# Patient Record
Sex: FEMALE | Race: WHITE | Employment: UNEMPLOYED | URBAN - METROPOLITAN AREA
[De-identification: names, ages, dates, MRNs, and addresses within clinical notes are randomized per-mention and may not be internally consistent; named-entity substitution may affect disease eponyms.]

---

## 2018-06-26 ENCOUNTER — HOSPITAL ENCOUNTER (OUTPATIENT)
Age: 63
Discharge: HOME | End: 2018-06-28
Payer: MEDICARE

## 2018-06-26 LAB
ALBUMIN SERPL-MCNC: 4 G/DL (ref 3.5–5.2)
ALP BLD-CCNC: 98 U/L (ref 35–104)
ALT SERPL-CCNC: 45 U/L (ref 0–32)
ANION GAP SERPL CALCULATED.3IONS-SCNC: 15 MMOL/L (ref 7–16)
AST SERPL-CCNC: 34 U/L (ref 0–31)
BASOPHILS ABSOLUTE: 0.06 E9/L (ref 0–0.2)
BASOPHILS RELATIVE PERCENT: 0.8 % (ref 0–2)
BILIRUB SERPL-MCNC: 0.6 MG/DL (ref 0–1.2)
BUN BLDV-MCNC: 19 MG/DL (ref 8–23)
CALCIUM SERPL-MCNC: 9.1 MG/DL (ref 8.6–10.2)
CHLORIDE BLD-SCNC: 106 MMOL/L (ref 98–107)
CHOLESTEROL, TOTAL: 148 MG/DL (ref 0–199)
CO2: 21 MMOL/L (ref 22–29)
CREAT SERPL-MCNC: 1.2 MG/DL (ref 0.5–1)
CREATININE URINE: 41 MG/DL (ref 29–226)
EOSINOPHILS ABSOLUTE: 0.21 E9/L (ref 0.05–0.5)
EOSINOPHILS RELATIVE PERCENT: 2.8 % (ref 0–6)
GFR AFRICAN AMERICAN: 55
GFR NON-AFRICAN AMERICAN: 45 ML/MIN/1.73
GLUCOSE BLD-MCNC: 77 MG/DL (ref 74–109)
HBA1C MFR BLD: 7.7 % (ref 4–5.6)
HCT VFR BLD CALC: 37.9 % (ref 34–48)
HDLC SERPL-MCNC: 61 MG/DL
HEMOGLOBIN: 11.2 G/DL (ref 11.5–15.5)
IMMATURE GRANULOCYTES #: 0.02 E9/L
IMMATURE GRANULOCYTES %: 0.3 % (ref 0–5)
LDL CHOLESTEROL CALCULATED: 67 MG/DL (ref 0–99)
LYMPHOCYTES ABSOLUTE: 2.18 E9/L (ref 1.5–4)
LYMPHOCYTES RELATIVE PERCENT: 29.4 % (ref 20–42)
MCH RBC QN AUTO: 26.8 PG (ref 26–35)
MCHC RBC AUTO-ENTMCNC: 29.6 % (ref 32–34.5)
MCV RBC AUTO: 90.7 FL (ref 80–99.9)
MICROALBUMIN UR-MCNC: <12 MG/L
MICROALBUMIN/CREAT UR-RTO: ABNORMAL (ref 0–30)
MONOCYTES ABSOLUTE: 0.63 E9/L (ref 0.1–0.95)
MONOCYTES RELATIVE PERCENT: 8.5 % (ref 2–12)
NEUTROPHILS ABSOLUTE: 4.31 E9/L (ref 1.8–7.3)
NEUTROPHILS RELATIVE PERCENT: 58.2 % (ref 43–80)
PDW BLD-RTO: 17.2 FL (ref 11.5–15)
PLATELET # BLD: 281 E9/L (ref 130–450)
PMV BLD AUTO: 10.2 FL (ref 7–12)
POTASSIUM SERPL-SCNC: 4.8 MMOL/L (ref 3.5–5)
RBC # BLD: 4.18 E12/L (ref 3.5–5.5)
SODIUM BLD-SCNC: 142 MMOL/L (ref 132–146)
TOTAL PROTEIN: 7.1 G/DL (ref 6.4–8.3)
TRIGL SERPL-MCNC: 102 MG/DL (ref 0–149)
TSH SERPL DL<=0.05 MIU/L-ACNC: 3.94 UIU/ML (ref 0.27–4.2)
VLDLC SERPL CALC-MCNC: 20 MG/DL
WBC # BLD: 7.4 E9/L (ref 4.5–11.5)

## 2018-06-26 PROCEDURE — 82044 UR ALBUMIN SEMIQUANTITATIVE: CPT

## 2018-06-26 PROCEDURE — 82570 ASSAY OF URINE CREATININE: CPT

## 2018-06-26 PROCEDURE — 83036 HEMOGLOBIN GLYCOSYLATED A1C: CPT

## 2018-06-26 PROCEDURE — 85025 COMPLETE CBC W/AUTO DIFF WBC: CPT

## 2018-06-26 PROCEDURE — 80053 COMPREHEN METABOLIC PANEL: CPT

## 2018-06-26 PROCEDURE — 84443 ASSAY THYROID STIM HORMONE: CPT

## 2018-06-26 PROCEDURE — 80061 LIPID PANEL: CPT

## 2018-10-26 ENCOUNTER — HOSPITAL ENCOUNTER (OUTPATIENT)
Age: 63
Discharge: HOME | End: 2018-10-28
Payer: MEDICARE

## 2018-10-26 LAB
ALBUMIN SERPL-MCNC: 4.3 G/DL (ref 3.5–5.2)
ALP BLD-CCNC: 81 U/L (ref 35–104)
ALT SERPL-CCNC: 79 U/L (ref 0–32)
ANION GAP SERPL CALCULATED.3IONS-SCNC: 15 MMOL/L (ref 7–16)
AST SERPL-CCNC: 46 U/L (ref 0–31)
BASOPHILS ABSOLUTE: 0.05 E9/L (ref 0–0.2)
BASOPHILS RELATIVE PERCENT: 0.7 % (ref 0–2)
BILIRUB SERPL-MCNC: 0.6 MG/DL (ref 0–1.2)
BUN BLDV-MCNC: 29 MG/DL (ref 8–23)
CALCIUM SERPL-MCNC: 9.4 MG/DL (ref 8.6–10.2)
CHLORIDE BLD-SCNC: 106 MMOL/L (ref 98–107)
CHOLESTEROL, TOTAL: 159 MG/DL (ref 0–199)
CO2: 21 MMOL/L (ref 22–29)
CREAT SERPL-MCNC: 1.2 MG/DL (ref 0.5–1)
CREATININE URINE: 118 MG/DL (ref 29–226)
EOSINOPHILS ABSOLUTE: 0.16 E9/L (ref 0.05–0.5)
EOSINOPHILS RELATIVE PERCENT: 2.1 % (ref 0–6)
GFR AFRICAN AMERICAN: 55
GFR NON-AFRICAN AMERICAN: 45 ML/MIN/1.73
GLUCOSE BLD-MCNC: 142 MG/DL (ref 74–109)
HBA1C MFR BLD: 7.9 % (ref 4–5.6)
HCT VFR BLD CALC: 43.8 % (ref 34–48)
HDLC SERPL-MCNC: 60 MG/DL
HEMOGLOBIN: 13.4 G/DL (ref 11.5–15.5)
IMMATURE GRANULOCYTES #: 0.02 E9/L
IMMATURE GRANULOCYTES %: 0.3 % (ref 0–5)
LDL CHOLESTEROL CALCULATED: 65 MG/DL (ref 0–99)
LYMPHOCYTES ABSOLUTE: 2.12 E9/L (ref 1.5–4)
LYMPHOCYTES RELATIVE PERCENT: 28.5 % (ref 20–42)
MCH RBC QN AUTO: 28.2 PG (ref 26–35)
MCHC RBC AUTO-ENTMCNC: 30.6 % (ref 32–34.5)
MCV RBC AUTO: 92.2 FL (ref 80–99.9)
MICROALBUMIN UR-MCNC: <12 MG/L
MICROALBUMIN/CREAT UR-RTO: ABNORMAL (ref 0–30)
MONOCYTES ABSOLUTE: 0.48 E9/L (ref 0.1–0.95)
MONOCYTES RELATIVE PERCENT: 6.4 % (ref 2–12)
NEUTROPHILS ABSOLUTE: 4.62 E9/L (ref 1.8–7.3)
NEUTROPHILS RELATIVE PERCENT: 62 % (ref 43–80)
PDW BLD-RTO: 18.6 FL (ref 11.5–15)
PLATELET # BLD: 222 E9/L (ref 130–450)
PMV BLD AUTO: 10.3 FL (ref 7–12)
POTASSIUM SERPL-SCNC: 4.7 MMOL/L (ref 3.5–5)
RBC # BLD: 4.75 E12/L (ref 3.5–5.5)
SODIUM BLD-SCNC: 142 MMOL/L (ref 132–146)
TOTAL PROTEIN: 7.5 G/DL (ref 6.4–8.3)
TRIGL SERPL-MCNC: 169 MG/DL (ref 0–149)
TSH SERPL DL<=0.05 MIU/L-ACNC: 1.77 UIU/ML (ref 0.27–4.2)
VLDLC SERPL CALC-MCNC: 34 MG/DL
WBC # BLD: 7.5 E9/L (ref 4.5–11.5)

## 2018-10-26 PROCEDURE — 82044 UR ALBUMIN SEMIQUANTITATIVE: CPT

## 2018-10-26 PROCEDURE — 85025 COMPLETE CBC W/AUTO DIFF WBC: CPT

## 2018-10-26 PROCEDURE — 83036 HEMOGLOBIN GLYCOSYLATED A1C: CPT

## 2018-10-26 PROCEDURE — 84443 ASSAY THYROID STIM HORMONE: CPT

## 2018-10-26 PROCEDURE — 82570 ASSAY OF URINE CREATININE: CPT

## 2018-10-26 PROCEDURE — 80053 COMPREHEN METABOLIC PANEL: CPT

## 2018-10-26 PROCEDURE — 80061 LIPID PANEL: CPT

## 2019-02-26 ENCOUNTER — HOSPITAL ENCOUNTER (OUTPATIENT)
Age: 64
Discharge: HOME | End: 2019-02-28
Payer: MEDICARE

## 2019-02-26 LAB
ALBUMIN SERPL-MCNC: 4.3 G/DL (ref 3.5–5.2)
ALP BLD-CCNC: 88 U/L (ref 35–104)
ALT SERPL-CCNC: 43 U/L (ref 0–32)
ANION GAP SERPL CALCULATED.3IONS-SCNC: 12 MMOL/L (ref 7–16)
AST SERPL-CCNC: 29 U/L (ref 0–31)
BASOPHILS ABSOLUTE: 0.04 E9/L (ref 0–0.2)
BASOPHILS RELATIVE PERCENT: 0.6 % (ref 0–2)
BILIRUB SERPL-MCNC: 0.6 MG/DL (ref 0–1.2)
BUN BLDV-MCNC: 18 MG/DL (ref 8–23)
CALCIUM SERPL-MCNC: 9.1 MG/DL (ref 8.6–10.2)
CHLORIDE BLD-SCNC: 106 MMOL/L (ref 98–107)
CHOLESTEROL, TOTAL: 154 MG/DL (ref 0–199)
CO2: 24 MMOL/L (ref 22–29)
CREAT SERPL-MCNC: 1.1 MG/DL (ref 0.5–1)
CREATININE URINE: 106 MG/DL (ref 29–226)
EOSINOPHILS ABSOLUTE: 0.14 E9/L (ref 0.05–0.5)
EOSINOPHILS RELATIVE PERCENT: 2.2 % (ref 0–6)
FOLATE: >20 NG/ML (ref 4.8–24.2)
GFR AFRICAN AMERICAN: >60
GFR NON-AFRICAN AMERICAN: 50 ML/MIN/1.73
GLUCOSE BLD-MCNC: 145 MG/DL (ref 74–99)
HBA1C MFR BLD: 7.4 % (ref 4–5.6)
HCT VFR BLD CALC: 44.4 % (ref 34–48)
HDLC SERPL-MCNC: 56 MG/DL
HEMOGLOBIN: 13.8 G/DL (ref 11.5–15.5)
IMMATURE GRANULOCYTES #: 0.02 E9/L
IMMATURE GRANULOCYTES %: 0.3 % (ref 0–5)
LDL CHOLESTEROL CALCULATED: 64 MG/DL (ref 0–99)
LYMPHOCYTES ABSOLUTE: 1.59 E9/L (ref 1.5–4)
LYMPHOCYTES RELATIVE PERCENT: 25 % (ref 20–42)
MCH RBC QN AUTO: 31.2 PG (ref 26–35)
MCHC RBC AUTO-ENTMCNC: 31.1 % (ref 32–34.5)
MCV RBC AUTO: 100.2 FL (ref 80–99.9)
MICROALBUMIN UR-MCNC: <12 MG/L
MICROALBUMIN/CREAT UR-RTO: ABNORMAL (ref 0–30)
MONOCYTES ABSOLUTE: 0.42 E9/L (ref 0.1–0.95)
MONOCYTES RELATIVE PERCENT: 6.6 % (ref 2–12)
NEUTROPHILS ABSOLUTE: 4.16 E9/L (ref 1.8–7.3)
NEUTROPHILS RELATIVE PERCENT: 65.3 % (ref 43–80)
PDW BLD-RTO: 13.8 FL (ref 11.5–15)
PLATELET # BLD: 235 E9/L (ref 130–450)
PMV BLD AUTO: 9.9 FL (ref 7–12)
POTASSIUM SERPL-SCNC: 4.5 MMOL/L (ref 3.5–5)
RBC # BLD: 4.43 E12/L (ref 3.5–5.5)
SODIUM BLD-SCNC: 142 MMOL/L (ref 132–146)
TOTAL PROTEIN: 7.1 G/DL (ref 6.4–8.3)
TRIGL SERPL-MCNC: 171 MG/DL (ref 0–149)
TSH SERPL DL<=0.05 MIU/L-ACNC: 2.56 UIU/ML (ref 0.27–4.2)
VITAMIN B-12: 1293 PG/ML (ref 211–946)
VITAMIN D 25-HYDROXY: 23 NG/ML (ref 30–100)
VLDLC SERPL CALC-MCNC: 34 MG/DL
WBC # BLD: 6.4 E9/L (ref 4.5–11.5)

## 2019-02-26 PROCEDURE — 82044 UR ALBUMIN SEMIQUANTITATIVE: CPT

## 2019-02-26 PROCEDURE — 82306 VITAMIN D 25 HYDROXY: CPT

## 2019-02-26 PROCEDURE — 84425 ASSAY OF VITAMIN B-1: CPT

## 2019-02-26 PROCEDURE — 84630 ASSAY OF ZINC: CPT

## 2019-02-26 PROCEDURE — 80053 COMPREHEN METABOLIC PANEL: CPT

## 2019-02-26 PROCEDURE — 82746 ASSAY OF FOLIC ACID SERUM: CPT

## 2019-02-26 PROCEDURE — 82607 VITAMIN B-12: CPT

## 2019-02-26 PROCEDURE — 80061 LIPID PANEL: CPT

## 2019-02-26 PROCEDURE — 85025 COMPLETE CBC W/AUTO DIFF WBC: CPT

## 2019-02-26 PROCEDURE — 83036 HEMOGLOBIN GLYCOSYLATED A1C: CPT

## 2019-02-26 PROCEDURE — 82570 ASSAY OF URINE CREATININE: CPT

## 2019-02-26 PROCEDURE — 84443 ASSAY THYROID STIM HORMONE: CPT

## 2019-03-02 LAB
VITAMIN B1, PLASMA: 10 NMOL/L (ref 4–15)
ZINC: 76 UG/DL (ref 60–120)

## 2019-03-14 ENCOUNTER — HOSPITAL ENCOUNTER (OUTPATIENT)
Dept: MAMMOGRAPHY | Age: 64
Discharge: HOME | End: 2019-03-16
Payer: MEDICARE

## 2019-03-14 DIAGNOSIS — Z12.39 BREAST CANCER SCREENING: ICD-10-CM

## 2019-03-14 PROCEDURE — 77063 BREAST TOMOSYNTHESIS BI: CPT

## 2019-06-26 ENCOUNTER — HOSPITAL ENCOUNTER (OUTPATIENT)
Age: 64
Discharge: HOME | End: 2019-06-28
Payer: MEDICARE

## 2019-06-26 LAB
ALBUMIN SERPL-MCNC: 4.3 G/DL (ref 3.5–5.2)
ALP BLD-CCNC: 74 U/L (ref 35–104)
ALT SERPL-CCNC: 36 U/L (ref 0–32)
ANION GAP SERPL CALCULATED.3IONS-SCNC: 13 MMOL/L (ref 7–16)
AST SERPL-CCNC: 26 U/L (ref 0–31)
BASOPHILS ABSOLUTE: 0.04 E9/L (ref 0–0.2)
BASOPHILS RELATIVE PERCENT: 0.6 % (ref 0–2)
BILIRUB SERPL-MCNC: 0.5 MG/DL (ref 0–1.2)
BUN BLDV-MCNC: 25 MG/DL (ref 8–23)
CALCIUM SERPL-MCNC: 9.2 MG/DL (ref 8.6–10.2)
CHLORIDE BLD-SCNC: 110 MMOL/L (ref 98–107)
CHOLESTEROL, TOTAL: 157 MG/DL (ref 0–199)
CO2: 20 MMOL/L (ref 22–29)
CREAT SERPL-MCNC: 1.2 MG/DL (ref 0.5–1)
CREATININE URINE: 71 MG/DL (ref 29–226)
EOSINOPHILS ABSOLUTE: 0.15 E9/L (ref 0.05–0.5)
EOSINOPHILS RELATIVE PERCENT: 2.3 % (ref 0–6)
FOLATE: >20 NG/ML (ref 4.8–24.2)
GFR AFRICAN AMERICAN: 54
GFR NON-AFRICAN AMERICAN: 45 ML/MIN/1.73
GLUCOSE BLD-MCNC: 66 MG/DL (ref 74–99)
HBA1C MFR BLD: 7.3 % (ref 4–5.6)
HCT VFR BLD CALC: 42.5 % (ref 34–48)
HDLC SERPL-MCNC: 62 MG/DL
HEMOGLOBIN: 13.1 G/DL (ref 11.5–15.5)
IMMATURE GRANULOCYTES #: 0.01 E9/L
IMMATURE GRANULOCYTES %: 0.2 % (ref 0–5)
LDL CHOLESTEROL CALCULATED: 70 MG/DL (ref 0–99)
LYMPHOCYTES ABSOLUTE: 2.52 E9/L (ref 1.5–4)
LYMPHOCYTES RELATIVE PERCENT: 38.2 % (ref 20–42)
MCH RBC QN AUTO: 31.9 PG (ref 26–35)
MCHC RBC AUTO-ENTMCNC: 30.8 % (ref 32–34.5)
MCV RBC AUTO: 103.4 FL (ref 80–99.9)
MICROALBUMIN UR-MCNC: <12 MG/L
MICROALBUMIN/CREAT UR-RTO: ABNORMAL (ref 0–30)
MONOCYTES ABSOLUTE: 0.53 E9/L (ref 0.1–0.95)
MONOCYTES RELATIVE PERCENT: 8 % (ref 2–12)
NEUTROPHILS ABSOLUTE: 3.35 E9/L (ref 1.8–7.3)
NEUTROPHILS RELATIVE PERCENT: 50.7 % (ref 43–80)
PDW BLD-RTO: 14.2 FL (ref 11.5–15)
PLATELET # BLD: 239 E9/L (ref 130–450)
PMV BLD AUTO: 9.6 FL (ref 7–12)
POTASSIUM SERPL-SCNC: 4.5 MMOL/L (ref 3.5–5)
RBC # BLD: 4.11 E12/L (ref 3.5–5.5)
SODIUM BLD-SCNC: 143 MMOL/L (ref 132–146)
TOTAL PROTEIN: 6.9 G/DL (ref 6.4–8.3)
TRIGL SERPL-MCNC: 126 MG/DL (ref 0–149)
TSH SERPL DL<=0.05 MIU/L-ACNC: 2.88 UIU/ML (ref 0.27–4.2)
VITAMIN B-12: 753 PG/ML (ref 211–946)
VITAMIN D 25-HYDROXY: 28 NG/ML (ref 30–100)
VLDLC SERPL CALC-MCNC: 25 MG/DL
WBC # BLD: 6.6 E9/L (ref 4.5–11.5)

## 2019-06-26 PROCEDURE — 82044 UR ALBUMIN SEMIQUANTITATIVE: CPT

## 2019-06-26 PROCEDURE — 80061 LIPID PANEL: CPT

## 2019-06-26 PROCEDURE — 82306 VITAMIN D 25 HYDROXY: CPT

## 2019-06-26 PROCEDURE — 84630 ASSAY OF ZINC: CPT

## 2019-06-26 PROCEDURE — 83036 HEMOGLOBIN GLYCOSYLATED A1C: CPT

## 2019-06-26 PROCEDURE — 82746 ASSAY OF FOLIC ACID SERUM: CPT

## 2019-06-26 PROCEDURE — 85025 COMPLETE CBC W/AUTO DIFF WBC: CPT

## 2019-06-26 PROCEDURE — 82570 ASSAY OF URINE CREATININE: CPT

## 2019-06-26 PROCEDURE — 80053 COMPREHEN METABOLIC PANEL: CPT

## 2019-06-26 PROCEDURE — 84425 ASSAY OF VITAMIN B-1: CPT

## 2019-06-26 PROCEDURE — 84443 ASSAY THYROID STIM HORMONE: CPT

## 2019-06-26 PROCEDURE — 82607 VITAMIN B-12: CPT

## 2019-06-30 LAB
VITAMIN B1, PLASMA: 37 NMOL/L (ref 4–15)
ZINC: 72.6 UG/DL (ref 60–120)

## 2019-10-07 ENCOUNTER — HOSPITAL ENCOUNTER (OUTPATIENT)
Age: 64
Discharge: HOME | End: 2019-10-09
Payer: MEDICARE

## 2019-10-07 LAB
ALBUMIN SERPL-MCNC: 4.2 G/DL (ref 3.5–5.2)
ALP BLD-CCNC: 85 U/L (ref 35–104)
ALT SERPL-CCNC: 28 U/L (ref 0–32)
ANION GAP SERPL CALCULATED.3IONS-SCNC: 12 MMOL/L (ref 7–16)
AST SERPL-CCNC: 24 U/L (ref 0–31)
BASOPHILS ABSOLUTE: 0.04 E9/L (ref 0–0.2)
BASOPHILS RELATIVE PERCENT: 0.6 % (ref 0–2)
BILIRUB SERPL-MCNC: 0.8 MG/DL (ref 0–1.2)
BUN BLDV-MCNC: 24 MG/DL (ref 8–23)
C-REACTIVE PROTEIN: 0.2 MG/DL (ref 0–0.4)
CALCIUM SERPL-MCNC: 9.6 MG/DL (ref 8.6–10.2)
CHLORIDE BLD-SCNC: 104 MMOL/L (ref 98–107)
CO2: 26 MMOL/L (ref 22–29)
CREAT SERPL-MCNC: 1.2 MG/DL (ref 0.5–1)
EOSINOPHILS ABSOLUTE: 0.07 E9/L (ref 0.05–0.5)
EOSINOPHILS RELATIVE PERCENT: 1.1 % (ref 0–6)
FOLATE: >20 NG/ML (ref 4.8–24.2)
GFR AFRICAN AMERICAN: 54
GFR NON-AFRICAN AMERICAN: 45 ML/MIN/1.73
GLUCOSE BLD-MCNC: 188 MG/DL (ref 74–99)
HBA1C MFR BLD: 6.9 % (ref 4–5.6)
HCT VFR BLD CALC: 41.9 % (ref 34–48)
HEMOGLOBIN: 13.4 G/DL (ref 11.5–15.5)
IMMATURE GRANULOCYTES #: 0.01 E9/L
IMMATURE GRANULOCYTES %: 0.2 % (ref 0–5)
LYMPHOCYTES ABSOLUTE: 1.16 E9/L (ref 1.5–4)
LYMPHOCYTES RELATIVE PERCENT: 17.8 % (ref 20–42)
MCH RBC QN AUTO: 32.2 PG (ref 26–35)
MCHC RBC AUTO-ENTMCNC: 32 % (ref 32–34.5)
MCV RBC AUTO: 100.7 FL (ref 80–99.9)
MICROALBUMIN UR-MCNC: 18.5 MG/L
MONOCYTES ABSOLUTE: 0.41 E9/L (ref 0.1–0.95)
MONOCYTES RELATIVE PERCENT: 6.3 % (ref 2–12)
NEUTROPHILS ABSOLUTE: 4.82 E9/L (ref 1.8–7.3)
NEUTROPHILS RELATIVE PERCENT: 74 % (ref 43–80)
PDW BLD-RTO: 13.5 FL (ref 11.5–15)
PLATELET # BLD: 188 E9/L (ref 130–450)
PMV BLD AUTO: 10.4 FL (ref 7–12)
POTASSIUM SERPL-SCNC: 4.9 MMOL/L (ref 3.5–5)
RBC # BLD: 4.16 E12/L (ref 3.5–5.5)
SEDIMENTATION RATE, ERYTHROCYTE: 19 MM/HR (ref 0–20)
SODIUM BLD-SCNC: 142 MMOL/L (ref 132–146)
T4 FREE: 1.04 NG/DL (ref 0.93–1.7)
TOTAL PROTEIN: 7.2 G/DL (ref 6.4–8.3)
TSH SERPL DL<=0.05 MIU/L-ACNC: 2.26 UIU/ML (ref 0.27–4.2)
VITAMIN B-12: 606 PG/ML (ref 211–946)
VITAMIN D 25-HYDROXY: 37 NG/ML (ref 30–100)
WBC # BLD: 6.5 E9/L (ref 4.5–11.5)

## 2019-10-07 PROCEDURE — 85025 COMPLETE CBC W/AUTO DIFF WBC: CPT

## 2019-10-07 PROCEDURE — 84439 ASSAY OF FREE THYROXINE: CPT

## 2019-10-07 PROCEDURE — 83036 HEMOGLOBIN GLYCOSYLATED A1C: CPT

## 2019-10-07 PROCEDURE — 80053 COMPREHEN METABOLIC PANEL: CPT

## 2019-10-07 PROCEDURE — 83921 ORGANIC ACID SINGLE QUANT: CPT

## 2019-10-07 PROCEDURE — 82607 VITAMIN B-12: CPT

## 2019-10-07 PROCEDURE — 82746 ASSAY OF FOLIC ACID SERUM: CPT

## 2019-10-07 PROCEDURE — 84425 ASSAY OF VITAMIN B-1: CPT

## 2019-10-07 PROCEDURE — 84443 ASSAY THYROID STIM HORMONE: CPT

## 2019-10-07 PROCEDURE — 85651 RBC SED RATE NONAUTOMATED: CPT

## 2019-10-07 PROCEDURE — 82044 UR ALBUMIN SEMIQUANTITATIVE: CPT

## 2019-10-07 PROCEDURE — 82306 VITAMIN D 25 HYDROXY: CPT

## 2019-10-07 PROCEDURE — 86140 C-REACTIVE PROTEIN: CPT

## 2019-10-10 LAB — METHYLMALONIC ACID: 0.14 UMOL/L (ref 0–0.4)

## 2019-10-11 ENCOUNTER — HOSPITAL ENCOUNTER (OUTPATIENT)
Dept: CT IMAGING | Age: 64
Discharge: HOME | End: 2019-10-11
Payer: MEDICARE

## 2019-10-11 ENCOUNTER — HOSPITAL ENCOUNTER (OUTPATIENT)
Dept: CT IMAGING | Age: 64
End: 2019-10-11
Payer: MEDICARE

## 2019-10-11 DIAGNOSIS — R93.89 ABNORMAL RADIOLOGICAL FINDINGS IN SKIN AND SUBCUTANEOUS TISSUE: ICD-10-CM

## 2019-10-11 DIAGNOSIS — F09: ICD-10-CM

## 2019-10-11 LAB — VITAMIN B1 WHOLE BLOOD: 114 NMOL/L (ref 70–180)

## 2019-10-11 PROCEDURE — 6360000004 HC RX CONTRAST MEDICATION: Performed by: RADIOLOGY

## 2019-10-11 PROCEDURE — 71260 CT THORAX DX C+: CPT

## 2019-10-11 PROCEDURE — 70470 CT HEAD/BRAIN W/O & W/DYE: CPT

## 2019-10-11 RX ADMIN — IOPAMIDOL 80 ML: 755 INJECTION, SOLUTION INTRAVENOUS at 07:08

## 2020-04-02 ENCOUNTER — HOSPITAL ENCOUNTER (OUTPATIENT)
Age: 65
Discharge: HOME | End: 2020-04-04
Payer: MEDICARE

## 2020-04-02 LAB
ALBUMIN SERPL-MCNC: 4.5 G/DL (ref 3.5–5.2)
ALP BLD-CCNC: 80 U/L (ref 35–104)
ALT SERPL-CCNC: 40 U/L (ref 0–32)
ANION GAP SERPL CALCULATED.3IONS-SCNC: 14 MMOL/L (ref 7–16)
AST SERPL-CCNC: 29 U/L (ref 0–31)
BASOPHILS ABSOLUTE: 0.04 E9/L (ref 0–0.2)
BASOPHILS RELATIVE PERCENT: 0.5 % (ref 0–2)
BILIRUB SERPL-MCNC: 0.6 MG/DL (ref 0–1.2)
BUN BLDV-MCNC: 25 MG/DL (ref 8–23)
CALCIUM SERPL-MCNC: 9.6 MG/DL (ref 8.6–10.2)
CHLORIDE BLD-SCNC: 108 MMOL/L (ref 98–107)
CHOLESTEROL, TOTAL: 150 MG/DL (ref 0–199)
CO2: 22 MMOL/L (ref 22–29)
CREAT SERPL-MCNC: 1.3 MG/DL (ref 0.5–1)
EOSINOPHILS ABSOLUTE: 0.17 E9/L (ref 0.05–0.5)
EOSINOPHILS RELATIVE PERCENT: 2.3 % (ref 0–6)
GFR AFRICAN AMERICAN: 50
GFR NON-AFRICAN AMERICAN: 41 ML/MIN/1.73
GLUCOSE BLD-MCNC: 104 MG/DL (ref 74–99)
HBA1C MFR BLD: 8 % (ref 4–5.6)
HCT VFR BLD CALC: 44 % (ref 34–48)
HDLC SERPL-MCNC: 53 MG/DL
HEMOGLOBIN: 13.3 G/DL (ref 11.5–15.5)
IMMATURE GRANULOCYTES #: 0.01 E9/L
IMMATURE GRANULOCYTES %: 0.1 % (ref 0–5)
LDL CHOLESTEROL CALCULATED: 65 MG/DL (ref 0–99)
LYMPHOCYTES ABSOLUTE: 2.56 E9/L (ref 1.5–4)
LYMPHOCYTES RELATIVE PERCENT: 34.5 % (ref 20–42)
MCH RBC QN AUTO: 31.7 PG (ref 26–35)
MCHC RBC AUTO-ENTMCNC: 30.2 % (ref 32–34.5)
MCV RBC AUTO: 104.8 FL (ref 80–99.9)
MONOCYTES ABSOLUTE: 0.57 E9/L (ref 0.1–0.95)
MONOCYTES RELATIVE PERCENT: 7.7 % (ref 2–12)
NEUTROPHILS ABSOLUTE: 4.08 E9/L (ref 1.8–7.3)
NEUTROPHILS RELATIVE PERCENT: 54.9 % (ref 43–80)
PDW BLD-RTO: 15.1 FL (ref 11.5–15)
PLATELET # BLD: 243 E9/L (ref 130–450)
PMV BLD AUTO: 9.8 FL (ref 7–12)
POTASSIUM SERPL-SCNC: 4.5 MMOL/L (ref 3.5–5)
RBC # BLD: 4.2 E12/L (ref 3.5–5.5)
SODIUM BLD-SCNC: 144 MMOL/L (ref 132–146)
TOTAL PROTEIN: 7.2 G/DL (ref 6.4–8.3)
TRIGL SERPL-MCNC: 160 MG/DL (ref 0–149)
VLDLC SERPL CALC-MCNC: 32 MG/DL
WBC # BLD: 7.4 E9/L (ref 4.5–11.5)

## 2020-04-02 PROCEDURE — 80053 COMPREHEN METABOLIC PANEL: CPT

## 2020-04-02 PROCEDURE — 85025 COMPLETE CBC W/AUTO DIFF WBC: CPT

## 2020-04-02 PROCEDURE — 80061 LIPID PANEL: CPT

## 2020-04-02 PROCEDURE — 83036 HEMOGLOBIN GLYCOSYLATED A1C: CPT

## 2020-04-06 ENCOUNTER — HOSPITAL ENCOUNTER (OUTPATIENT)
Age: 65
Discharge: HOME | End: 2020-04-08
Payer: MEDICARE

## 2020-04-06 LAB — MICROALBUMIN UR-MCNC: <12 MG/L

## 2020-04-06 PROCEDURE — 82044 UR ALBUMIN SEMIQUANTITATIVE: CPT

## 2020-07-02 ENCOUNTER — HOSPITAL ENCOUNTER (OUTPATIENT)
Age: 65
Discharge: HOME | End: 2020-07-04
Payer: MEDICARE

## 2020-07-02 LAB
ALBUMIN SERPL-MCNC: 4.2 G/DL (ref 3.5–5.2)
ALP BLD-CCNC: 77 U/L (ref 35–104)
ALT SERPL-CCNC: 62 U/L (ref 0–32)
ANION GAP SERPL CALCULATED.3IONS-SCNC: 11 MMOL/L (ref 7–16)
AST SERPL-CCNC: 37 U/L (ref 0–31)
BASOPHILS ABSOLUTE: 0.05 E9/L (ref 0–0.2)
BASOPHILS RELATIVE PERCENT: 0.6 % (ref 0–2)
BILIRUB SERPL-MCNC: 0.6 MG/DL (ref 0–1.2)
BUN BLDV-MCNC: 23 MG/DL (ref 8–23)
CALCIUM SERPL-MCNC: 9.3 MG/DL (ref 8.6–10.2)
CHLORIDE BLD-SCNC: 105 MMOL/L (ref 98–107)
CHOLESTEROL, TOTAL: 156 MG/DL (ref 0–199)
CO2: 26 MMOL/L (ref 22–29)
CREAT SERPL-MCNC: 1.3 MG/DL (ref 0.5–1)
EOSINOPHILS ABSOLUTE: 0.16 E9/L (ref 0.05–0.5)
EOSINOPHILS RELATIVE PERCENT: 2.1 % (ref 0–6)
GFR AFRICAN AMERICAN: 49
GFR NON-AFRICAN AMERICAN: 41 ML/MIN/1.73
GLUCOSE BLD-MCNC: 77 MG/DL (ref 74–99)
HBA1C MFR BLD: 7.6 % (ref 4–5.6)
HCT VFR BLD CALC: 42.5 % (ref 34–48)
HDLC SERPL-MCNC: 60 MG/DL
HEMOGLOBIN: 13.1 G/DL (ref 11.5–15.5)
IMMATURE GRANULOCYTES #: 0.03 E9/L
IMMATURE GRANULOCYTES %: 0.4 % (ref 0–5)
LDL CHOLESTEROL CALCULATED: 68 MG/DL (ref 0–99)
LYMPHOCYTES ABSOLUTE: 3.28 E9/L (ref 1.5–4)
LYMPHOCYTES RELATIVE PERCENT: 42.2 % (ref 20–42)
MCH RBC QN AUTO: 32.3 PG (ref 26–35)
MCHC RBC AUTO-ENTMCNC: 30.8 % (ref 32–34.5)
MCV RBC AUTO: 104.9 FL (ref 80–99.9)
MONOCYTES ABSOLUTE: 0.61 E9/L (ref 0.1–0.95)
MONOCYTES RELATIVE PERCENT: 7.8 % (ref 2–12)
NEUTROPHILS ABSOLUTE: 3.65 E9/L (ref 1.8–7.3)
NEUTROPHILS RELATIVE PERCENT: 46.9 % (ref 43–80)
PDW BLD-RTO: 14.2 FL (ref 11.5–15)
PLATELET # BLD: 248 E9/L (ref 130–450)
PMV BLD AUTO: 9.6 FL (ref 7–12)
POTASSIUM SERPL-SCNC: 4.5 MMOL/L (ref 3.5–5)
RBC # BLD: 4.05 E12/L (ref 3.5–5.5)
SODIUM BLD-SCNC: 142 MMOL/L (ref 132–146)
TOTAL PROTEIN: 7.2 G/DL (ref 6.4–8.3)
TRIGL SERPL-MCNC: 140 MG/DL (ref 0–149)
VLDLC SERPL CALC-MCNC: 28 MG/DL
WBC # BLD: 7.8 E9/L (ref 4.5–11.5)

## 2020-07-02 PROCEDURE — 85025 COMPLETE CBC W/AUTO DIFF WBC: CPT

## 2020-07-02 PROCEDURE — 83036 HEMOGLOBIN GLYCOSYLATED A1C: CPT

## 2020-07-02 PROCEDURE — 80061 LIPID PANEL: CPT

## 2020-07-02 PROCEDURE — 80053 COMPREHEN METABOLIC PANEL: CPT

## 2020-07-08 ENCOUNTER — HOSPITAL ENCOUNTER (OUTPATIENT)
Age: 65
Discharge: HOME | End: 2020-07-10
Payer: MEDICARE

## 2020-07-08 LAB — MICROALBUMIN UR-MCNC: <12 MG/L

## 2020-07-08 PROCEDURE — 82044 UR ALBUMIN SEMIQUANTITATIVE: CPT

## 2021-09-11 ENCOUNTER — HOSPITAL ENCOUNTER (INPATIENT)
Age: 66
LOS: 4 days | Discharge: HOME | DRG: 177 | End: 2021-09-15
Attending: EMERGENCY MEDICINE | Admitting: INTERNAL MEDICINE
Payer: MEDICARE

## 2021-09-11 ENCOUNTER — APPOINTMENT (OUTPATIENT)
Dept: CT IMAGING | Age: 66
DRG: 177 | End: 2021-09-11
Payer: MEDICARE

## 2021-09-11 ENCOUNTER — APPOINTMENT (OUTPATIENT)
Dept: GENERAL RADIOLOGY | Age: 66
DRG: 177 | End: 2021-09-11
Payer: MEDICARE

## 2021-09-11 DIAGNOSIS — I10 ESSENTIAL HYPERTENSION, BENIGN: ICD-10-CM

## 2021-09-11 DIAGNOSIS — J12.82 PNEUMONIA DUE TO 2019-NCOV: ICD-10-CM

## 2021-09-11 DIAGNOSIS — J96.00 ACUTE RESPIRATORY FAILURE DUE TO COVID-19 (HCC): Primary | ICD-10-CM

## 2021-09-11 DIAGNOSIS — U07.1 ACUTE RESPIRATORY FAILURE DUE TO COVID-19 (HCC): Primary | ICD-10-CM

## 2021-09-11 DIAGNOSIS — J96.01 ACUTE RESPIRATORY FAILURE WITH HYPOXIA (HCC): ICD-10-CM

## 2021-09-11 DIAGNOSIS — U07.1 PNEUMONIA DUE TO 2019-NCOV: ICD-10-CM

## 2021-09-11 LAB
ALBUMIN SERPL-MCNC: 4 G/DL (ref 3.5–5.2)
ALP BLD-CCNC: 95 U/L (ref 35–104)
ALT SERPL-CCNC: 115 U/L (ref 0–32)
ANION GAP SERPL CALCULATED.3IONS-SCNC: 11 MMOL/L (ref 7–16)
AST SERPL-CCNC: 58 U/L (ref 0–31)
BASOPHILS ABSOLUTE: 0.02 E9/L (ref 0–0.2)
BASOPHILS RELATIVE PERCENT: 0.1 % (ref 0–2)
BILIRUB SERPL-MCNC: 0.5 MG/DL (ref 0–1.2)
BUN BLDV-MCNC: 32 MG/DL (ref 6–23)
C-REACTIVE PROTEIN: 1.5 MG/DL (ref 0–0.4)
CALCIUM SERPL-MCNC: 9.3 MG/DL (ref 8.6–10.2)
CHLORIDE BLD-SCNC: 109 MMOL/L (ref 98–107)
CHP ED QC CHECK: YES
CO2: 23 MMOL/L (ref 22–29)
CREAT SERPL-MCNC: 1.3 MG/DL (ref 0.5–1)
D DIMER: 336 NG/ML DDU
EKG ATRIAL RATE: 78 BPM
EKG P AXIS: 17 DEGREES
EKG P-R INTERVAL: 128 MS
EKG Q-T INTERVAL: 370 MS
EKG QRS DURATION: 74 MS
EKG QTC CALCULATION (BAZETT): 421 MS
EKG R AXIS: 1 DEGREES
EKG T AXIS: 30 DEGREES
EKG VENTRICULAR RATE: 78 BPM
EOSINOPHILS ABSOLUTE: 0.03 E9/L (ref 0.05–0.5)
EOSINOPHILS RELATIVE PERCENT: 0.2 % (ref 0–6)
FERRITIN: 80 NG/ML
FIBRINOGEN: >700 MG/DL (ref 225–540)
GFR AFRICAN AMERICAN: 49
GFR NON-AFRICAN AMERICAN: 41 ML/MIN/1.73
GLUCOSE BLD-MCNC: 118 MG/DL (ref 74–99)
GLUCOSE BLD-MCNC: 303 MG/DL
HCT VFR BLD CALC: 38.9 % (ref 34–48)
HEMOGLOBIN: 12.5 G/DL (ref 11.5–15.5)
IMMATURE GRANULOCYTES #: 0.1 E9/L
IMMATURE GRANULOCYTES %: 0.7 % (ref 0–5)
LACTATE DEHYDROGENASE: 259 U/L (ref 135–214)
LYMPHOCYTES ABSOLUTE: 1.01 E9/L (ref 1.5–4)
LYMPHOCYTES RELATIVE PERCENT: 7.2 % (ref 20–42)
MCH RBC QN AUTO: 29.1 PG (ref 26–35)
MCHC RBC AUTO-ENTMCNC: 32.1 % (ref 32–34.5)
MCV RBC AUTO: 90.7 FL (ref 80–99.9)
METER GLUCOSE: 303 MG/DL (ref 74–99)
MONOCYTES ABSOLUTE: 0.77 E9/L (ref 0.1–0.95)
MONOCYTES RELATIVE PERCENT: 5.5 % (ref 2–12)
NEUTROPHILS ABSOLUTE: 12.11 E9/L (ref 1.8–7.3)
NEUTROPHILS RELATIVE PERCENT: 86.3 % (ref 43–80)
PDW BLD-RTO: 16 FL (ref 11.5–15)
PLATELET # BLD: 265 E9/L (ref 130–450)
PMV BLD AUTO: 9.5 FL (ref 7–12)
POTASSIUM REFLEX MAGNESIUM: 4.5 MMOL/L (ref 3.5–5)
PRO-BNP: 662 PG/ML (ref 0–125)
PROCALCITONIN: 0.11 NG/ML (ref 0–0.08)
RBC # BLD: 4.29 E12/L (ref 3.5–5.5)
SARS-COV-2, NAAT: DETECTED
SODIUM BLD-SCNC: 143 MMOL/L (ref 132–146)
TOTAL PROTEIN: 7.3 G/DL (ref 6.4–8.3)
TROPONIN, HIGH SENSITIVITY: 9 NG/L (ref 0–9)
WBC # BLD: 14 E9/L (ref 4.5–11.5)

## 2021-09-11 PROCEDURE — 86140 C-REACTIVE PROTEIN: CPT

## 2021-09-11 PROCEDURE — 6360000002 HC RX W HCPCS: Performed by: INTERNAL MEDICINE

## 2021-09-11 PROCEDURE — 82728 ASSAY OF FERRITIN: CPT

## 2021-09-11 PROCEDURE — 83615 LACTATE (LD) (LDH) ENZYME: CPT

## 2021-09-11 PROCEDURE — 6370000000 HC RX 637 (ALT 250 FOR IP): Performed by: INTERNAL MEDICINE

## 2021-09-11 PROCEDURE — 99284 EMERGENCY DEPT VISIT MOD MDM: CPT

## 2021-09-11 PROCEDURE — 82962 GLUCOSE BLOOD TEST: CPT

## 2021-09-11 PROCEDURE — 2060000000 HC ICU INTERMEDIATE R&B

## 2021-09-11 PROCEDURE — 85025 COMPLETE CBC W/AUTO DIFF WBC: CPT

## 2021-09-11 PROCEDURE — 96374 THER/PROPH/DIAG INJ IV PUSH: CPT

## 2021-09-11 PROCEDURE — 84484 ASSAY OF TROPONIN QUANT: CPT

## 2021-09-11 PROCEDURE — 6360000002 HC RX W HCPCS: Performed by: STUDENT IN AN ORGANIZED HEALTH CARE EDUCATION/TRAINING PROGRAM

## 2021-09-11 PROCEDURE — 93005 ELECTROCARDIOGRAM TRACING: CPT | Performed by: STUDENT IN AN ORGANIZED HEALTH CARE EDUCATION/TRAINING PROGRAM

## 2021-09-11 PROCEDURE — 2700000000 HC OXYGEN THERAPY PER DAY

## 2021-09-11 PROCEDURE — 85378 FIBRIN DEGRADE SEMIQUANT: CPT

## 2021-09-11 PROCEDURE — 84145 PROCALCITONIN (PCT): CPT

## 2021-09-11 PROCEDURE — 71045 X-RAY EXAM CHEST 1 VIEW: CPT

## 2021-09-11 PROCEDURE — 83880 ASSAY OF NATRIURETIC PEPTIDE: CPT

## 2021-09-11 PROCEDURE — 2580000003 HC RX 258: Performed by: INTERNAL MEDICINE

## 2021-09-11 PROCEDURE — 87635 SARS-COV-2 COVID-19 AMP PRB: CPT

## 2021-09-11 PROCEDURE — 85384 FIBRINOGEN ACTIVITY: CPT

## 2021-09-11 PROCEDURE — 80053 COMPREHEN METABOLIC PANEL: CPT

## 2021-09-11 PROCEDURE — 71250 CT THORAX DX C-: CPT

## 2021-09-11 RX ORDER — DEXAMETHASONE SODIUM PHOSPHATE 10 MG/ML
6 INJECTION, SOLUTION INTRAMUSCULAR; INTRAVENOUS ONCE
Status: COMPLETED | OUTPATIENT
Start: 2021-09-11 | End: 2021-09-11

## 2021-09-11 RX ORDER — INSULIN GLARGINE 100 [IU]/ML
35 INJECTION, SOLUTION SUBCUTANEOUS NIGHTLY
Status: DISCONTINUED | OUTPATIENT
Start: 2021-09-11 | End: 2021-09-15 | Stop reason: HOSPADM

## 2021-09-11 RX ORDER — ALPRAZOLAM 0.25 MG/1
0.25 TABLET ORAL NIGHTLY
COMMUNITY

## 2021-09-11 RX ORDER — ATORVASTATIN CALCIUM 20 MG/1
80 TABLET, FILM COATED ORAL DAILY
Status: DISCONTINUED | OUTPATIENT
Start: 2021-09-12 | End: 2021-09-15 | Stop reason: HOSPADM

## 2021-09-11 RX ORDER — ASPIRIN 81 MG/1
81 TABLET ORAL DAILY
Status: DISCONTINUED | OUTPATIENT
Start: 2021-09-12 | End: 2021-09-15 | Stop reason: HOSPADM

## 2021-09-11 RX ORDER — FAMOTIDINE 20 MG/1
20 TABLET, FILM COATED ORAL 2 TIMES DAILY
Status: DISCONTINUED | OUTPATIENT
Start: 2021-09-11 | End: 2021-09-15 | Stop reason: HOSPADM

## 2021-09-11 RX ORDER — ALPRAZOLAM 0.25 MG/1
0.25 TABLET ORAL NIGHTLY PRN
Status: DISCONTINUED | OUTPATIENT
Start: 2021-09-11 | End: 2021-09-14 | Stop reason: SDUPTHER

## 2021-09-11 RX ORDER — ESCITALOPRAM OXALATE 10 MG/1
10 TABLET ORAL DAILY
COMMUNITY

## 2021-09-11 RX ORDER — ESCITALOPRAM OXALATE 10 MG/1
10 TABLET ORAL DAILY
Status: DISCONTINUED | OUTPATIENT
Start: 2021-09-12 | End: 2021-09-15 | Stop reason: HOSPADM

## 2021-09-11 RX ORDER — M-VIT,TX,IRON,MINS/CALC/FOLIC 27MG-0.4MG
1 TABLET ORAL DAILY
Status: DISCONTINUED | OUTPATIENT
Start: 2021-09-12 | End: 2021-09-15 | Stop reason: HOSPADM

## 2021-09-11 RX ORDER — FAMOTIDINE 20 MG/1
20 TABLET, FILM COATED ORAL DAILY
COMMUNITY

## 2021-09-11 RX ORDER — LOSARTAN POTASSIUM 50 MG/1
50 TABLET ORAL DAILY
COMMUNITY

## 2021-09-11 RX ORDER — EMPAGLIFLOZIN 10 MG/1
10 TABLET, FILM COATED ORAL DAILY
COMMUNITY

## 2021-09-11 RX ORDER — DOXYCYCLINE HYCLATE 100 MG/1
100 CAPSULE ORAL EVERY 12 HOURS SCHEDULED
Status: DISCONTINUED | OUTPATIENT
Start: 2021-09-11 | End: 2021-09-15 | Stop reason: HOSPADM

## 2021-09-11 RX ORDER — LOSARTAN POTASSIUM 50 MG/1
50 TABLET ORAL DAILY
Status: DISCONTINUED | OUTPATIENT
Start: 2021-09-12 | End: 2021-09-15 | Stop reason: HOSPADM

## 2021-09-11 RX ORDER — VITAMIN B COMPLEX
2000 TABLET ORAL DAILY
Status: DISCONTINUED | OUTPATIENT
Start: 2021-09-11 | End: 2021-09-15 | Stop reason: HOSPADM

## 2021-09-11 RX ORDER — DEXAMETHASONE SODIUM PHOSPHATE 10 MG/ML
6 INJECTION, SOLUTION INTRAMUSCULAR; INTRAVENOUS DAILY
Status: DISCONTINUED | OUTPATIENT
Start: 2021-09-12 | End: 2021-09-12

## 2021-09-11 RX ORDER — PANTOPRAZOLE SODIUM 40 MG/1
40 TABLET, DELAYED RELEASE ORAL DAILY
Status: DISCONTINUED | OUTPATIENT
Start: 2021-09-12 | End: 2021-09-15 | Stop reason: HOSPADM

## 2021-09-11 RX ADMIN — WATER 1000 MG: 1 INJECTION INTRAMUSCULAR; INTRAVENOUS; SUBCUTANEOUS at 20:57

## 2021-09-11 RX ADMIN — INSULIN GLARGINE 35 UNITS: 100 INJECTION, SOLUTION SUBCUTANEOUS at 21:24

## 2021-09-11 RX ADMIN — ENOXAPARIN SODIUM 60 MG: 60 INJECTION, SOLUTION INTRAVENOUS; SUBCUTANEOUS at 21:12

## 2021-09-11 RX ADMIN — DEXAMETHASONE SODIUM PHOSPHATE 6 MG: 10 INJECTION, SOLUTION INTRAMUSCULAR; INTRAVENOUS at 14:32

## 2021-09-11 RX ADMIN — INSULIN LISPRO 4 UNITS: 100 INJECTION, SOLUTION INTRAVENOUS; SUBCUTANEOUS at 21:22

## 2021-09-11 RX ADMIN — DOXYCYCLINE HYCLATE 100 MG: 100 CAPSULE ORAL at 21:12

## 2021-09-11 ASSESSMENT — ENCOUNTER SYMPTOMS
SHORTNESS OF BREATH: 1
VOMITING: 0
SORE THROAT: 0
ABDOMINAL PAIN: 0
COUGH: 1
CHEST TIGHTNESS: 1
NAUSEA: 0
BACK PAIN: 0

## 2021-09-11 NOTE — ED PROVIDER NOTES
This is a 24-year-old female with history of hypertension, hyperlipidemia, diabetes, asthma, presenting to the emergency department for COVID-19 symptoms. Patient originally developed symptoms on August 26, over 16 days ago. Patient states she was prescribed a Z-Benny, as well as what sounds like a Medrol Dosepak. She started developing shortness of breath in the past few days, has home pulse oximetry, developed hypoxia today, states her pulse ox dropped to 82% today. Patient states she has had a dry cough that has become productive in the last few days, without hemoptysis. Patient has also had shortness of breath the past few days, did not have this previously. Patient with fatigue, no nausea or vomiting or diarrhea or abdominal pain. She has had some mild fevers and chills, no significant fevers recently. Patient is not on home oxygen, no history of smoking. Patient was not vaccinated for COVID-19. Patient with no significant chest pain, but with some chest tightness associated with shortness of breath. Patient presenting with dyspnea, this is worse with exertion, moderate severity, improved by oxygen, present for the past 3 days but worsened today. Patient with no history of DVT or PE, no leg pain or swelling, no hemoptysis. The history is provided by the patient and medical records. Review of Systems   Constitutional: Positive for chills, fatigue and fever. HENT: Negative for congestion and sore throat. Eyes: Negative for visual disturbance. Respiratory: Positive for cough, chest tightness and shortness of breath. Cardiovascular: Negative for chest pain, palpitations and leg swelling. Gastrointestinal: Negative for abdominal pain, nausea and vomiting. Genitourinary: Negative for dysuria and flank pain. Musculoskeletal: Negative for back pain and neck pain. Skin: Negative for rash and wound. Neurological: Negative for syncope and light-headedness.         Physical Exam  Vitals and nursing note reviewed. Constitutional:       Appearance: She is not toxic-appearing or diaphoretic. HENT:      Head: Normocephalic and atraumatic. Right Ear: External ear normal.      Left Ear: External ear normal.   Eyes:      General: No scleral icterus. Extraocular Movements: Extraocular movements intact. Conjunctiva/sclera: Conjunctivae normal.   Cardiovascular:      Rate and Rhythm: Normal rate and regular rhythm. Pulses: Normal pulses. Heart sounds: Normal heart sounds. Pulmonary:      Effort: Pulmonary effort is normal. No respiratory distress. Breath sounds: Rales present. No wheezing. Comments: Patient with crackles throughout bilateral lungs, worse at lung bases. Patient with no accessory muscle use, no significant tachypnea, but requiring nasal cannula oxygen. Abdominal:      Comments: Obese, soft, nontender   Musculoskeletal:         General: No swelling or deformity. Cervical back: Normal range of motion and neck supple. Right lower leg: No edema. Left lower leg: No edema. Skin:     General: Skin is warm and dry. Capillary Refill: Capillary refill takes less than 2 seconds. Neurological:      General: No focal deficit present. Mental Status: She is alert. Psychiatric:         Mood and Affect: Mood normal.         Behavior: Behavior normal.         Thought Content: Thought content normal.         Judgment: Judgment normal.          Procedures     MDM  Number of Diagnoses or Management Options  Acute respiratory failure due to COVID-19 [facility])  Acute respiratory failure with hypoxia (HCC)  Diagnosis management comments: This is a 25-year-old female presenting to the emergency department for hypoxia, shortness of breath. Patient tested positive for COVID-19 on 8/26, has been getting worse last few days and newly developed hypoxia today, she was monitoring pulse oximetry at home.  Patient presenting with SPO2 in the low 80s on room air, improving to mid 90s on 5 L of O2 via nasal cannula. Patient in no significant respiratory distress, but with crackles throughout bilateral lungs, consistent with COVID-19 pneumonia. Work-up remarkable for diffuse bilateral infiltrates consistent with COVID-19 pneumonia. Patient requiring 5 L of oxygen via nasal cannula to maintain O2 sats in the 90s. Patient in no significant respiratory distress. No acute ischemic changes on EKG, negative troponin. Patient with mildly elevated D-dimer, but with allergy to contrast, suspect patient's hypoxia related to COVID-19 given appearance of lungs. Patient may need further work-up for this, inpatient physician recommended CT without contrast of the chest to further evaluate degree of patient's pneumonia. Patient given dose of Decadron here in the ED, will be admitted to the hospital for further treatment, work-up, monitoring. ED Course as of Sep 11 1619   Sat Sep 11, 2021   1315 EKG: This EKG is signed and interpreted by me. Rate: 78  Rhythm: Sinus  Interpretation: no acute changes  Comparison: stable as compared to patient's most recent EKG      [RH]   1317 Patient did not receive COVID-19 vaccination. [RH]   1503 Patient reevaluated, she is resting comfortably. Patient is 93% on 5 L of O2 by nasal cannula. [RH]   8692 Discussed case with Dr. Alexandre Gurrola for Dr. Joselo Mayer. He agreed to admit patient, requested CT without contrast of the chest.    [RH]      ED Course User Index  [RH] [address], DO     ED Course as of Sep 11 1619   Sat Sep 11, 2021   1315 EKG: This EKG is signed and interpreted by me. Rate: 78  Rhythm: Sinus  Interpretation: no acute changes  Comparison: stable as compared to patient's most recent EKG      [RH]   1317 Patient did not receive COVID-19 vaccination. [RH]   1503 Patient reevaluated, she is resting comfortably. Patient is 93% on 5 L of O2 by nasal cannula.     [RH]   7235 Discussed case with  Mamie Robles for Dr. Antonette Land. He agreed to admit patient, requested CT without contrast of the chest.    [RH]      ED Course User Index  [RH] [address], DO       --------------------------------------------- PAST HISTORY ---------------------------------------------  Past Medical History:  has a past medical history of CAD (coronary artery disease), Chest heaviness, Chronic kidney disease, DM (diabetes mellitus) ([facility] [address] Dyslipidemia, FH: CAD (coronary artery disease), GERD (gastroesophageal reflux disease), Headache, HTN (hypertension), and Neuropathy. Past Surgical History:  has a past surgical history that includes Gastric bypass surgery; Cholecystectomy;  section; Gallbladder surgery; and Cervical spine surgery. Social History:  reports that she has never smoked. She has never used smokeless tobacco. She reports that she does not drink alcohol and does not use drugs. Family History: family history includes Cancer in her brother, father, sister, and sister; Heart Disease in her mother; High Blood Pressure in her mother. The patients home medications have been reviewed.     Allergies: Dye [iodides] and Gabapentin    -------------------------------------------------- RESULTS -------------------------------------------------    LABS:  Results for orders placed or performed during the hospital encounter of 21   COVID-19, Rapid    Specimen: Nasopharyngeal Swab   Result Value Ref Range    SARS-CoV-2, NAAT DETECTED (A) Not Detected   CBC Auto Differential   Result Value Ref Range    WBC 14.0 (H) 4.5 - 11.5 E9/L    RBC 4.29 3.50 - 5.50 E12/L    Hemoglobin 12.5 11.5 - 15.5 g/dL    Hematocrit 38.9 34.0 - 48.0 %    MCV 90.7 80.0 - 99.9 fL    MCH 29.1 26.0 - 35.0 pg    MCHC 32.1 32.0 - 34.5 %    RDW 16.0 (H) 11.5 - 15.0 fL    Platelets 255 747 - 862 E9/L    MPV 9.5 7.0 - 12.0 fL    Neutrophils % 86.3 (H) 43.0 - 80.0 %    Immature Granulocytes % 0.7 0.0 - 5.0 %    Lymphocytes % 7.2 (L) 20.0 AND VITALS REVIEWED ---------------------------  Date / Time Roomed:  9/11/2021 12:17 PM  ED Bed Assignment:  15/15    The nursing notes within the ED encounter and vital signs as below have been reviewed. Patient Vitals for the past 24 hrs:   BP Temp Temp src Pulse Resp SpO2   09/11/21 1507    69 19 96 %   09/11/21 1506    69 17 96 %   09/11/21 1505    67 18 96 %   09/11/21 1504    68 20 96 %   09/11/21 1503    69 18 95 %   09/11/21 1313    75 23 94 %   09/11/21 1312    75 17 94 %   09/11/21 1311    77 13 94 %   09/11/21 1310    77 18 93 %   09/11/21 1309    73 20 95 %   09/11/21 1308    74 24 93 %   09/11/21 1307    77 18 94 %   09/11/21 1306    74 21 94 %   09/11/21 1222  98.5 °F (36.9 °C) Temporal      09/11/21 1218 114/60   82 22 92 %       Oxygen Saturation Interpretation: Abnormal and Improved after treatment    ------------------------------------------ PROGRESS NOTES ------------------------------------------  Re-evaluation(s):  See ED COURSE    Counseling:  I have spoken with the patient and discussed todays results, in addition to providing specific details for the plan of care and counseling regarding the diagnosis and prognosis. Their questions are answered at this time and they are agreeable with the plan of admission.    --------------------------------- ADDITIONAL PROVIDER NOTES ---------------------------------  Consultations:  See ED COURSE  This patient's ED course included: a personal history and physicial examination, re-evaluation prior to disposition, multiple bedside re-evaluations, IV medications, cardiac monitoring and continuous pulse oximetry    This patient has remained hemodynamically stable during their ED course. Diagnosis:  1. Acute respiratory failure due to COVID-19 [facility]) New Problem   2. Acute respiratory failure with hypoxia (HCC) New Problem   3.  Pneumonia due to 2019-nCoV Worsening       Disposition:  Patient's disposition: Admit to intermediate, COVID-19 area  Patient's condition is stable.          [address] DO  Resident  09/11/21 3041

## 2021-09-11 NOTE — ED NOTES
Bed: 15  Expected date:   Expected time:   Means of arrival:   Comments:  Edwin Delgado RN  09/11/21 [id number]

## 2021-09-12 LAB
C-REACTIVE PROTEIN: 5.1 MG/DL (ref 0–0.4)
D DIMER: 216 NG/ML DDU
METER GLUCOSE: 197 MG/DL (ref 74–99)
METER GLUCOSE: 231 MG/DL (ref 74–99)
METER GLUCOSE: 266 MG/DL (ref 74–99)
METER GLUCOSE: 276 MG/DL (ref 74–99)
PROCALCITONIN: 0.17 NG/ML (ref 0–0.08)

## 2021-09-12 PROCEDURE — 82962 GLUCOSE BLOOD TEST: CPT

## 2021-09-12 PROCEDURE — 2060000000 HC ICU INTERMEDIATE R&B

## 2021-09-12 PROCEDURE — 84145 PROCALCITONIN (PCT): CPT

## 2021-09-12 PROCEDURE — 2580000003 HC RX 258: Performed by: INTERNAL MEDICINE

## 2021-09-12 PROCEDURE — 86140 C-REACTIVE PROTEIN: CPT

## 2021-09-12 PROCEDURE — 36415 COLL VENOUS BLD VENIPUNCTURE: CPT

## 2021-09-12 PROCEDURE — 85378 FIBRIN DEGRADE SEMIQUANT: CPT

## 2021-09-12 PROCEDURE — 6360000002 HC RX W HCPCS: Performed by: INTERNAL MEDICINE

## 2021-09-12 PROCEDURE — 6370000000 HC RX 637 (ALT 250 FOR IP): Performed by: INTERNAL MEDICINE

## 2021-09-12 PROCEDURE — 2700000000 HC OXYGEN THERAPY PER DAY

## 2021-09-12 RX ORDER — METHYLPREDNISOLONE 4 MG/1
4 TABLET ORAL SEE ADMIN INSTRUCTIONS
COMMUNITY
End: 2021-09-29 | Stop reason: ALTCHOICE

## 2021-09-12 RX ORDER — ALBUTEROL SULFATE 90 UG/1
2 AEROSOL, METERED RESPIRATORY (INHALATION) EVERY 6 HOURS PRN
COMMUNITY

## 2021-09-12 RX ORDER — ACETAMINOPHEN 325 MG/1
650 TABLET ORAL EVERY 6 HOURS PRN
Status: DISCONTINUED | OUTPATIENT
Start: 2021-09-12 | End: 2021-09-15 | Stop reason: HOSPADM

## 2021-09-12 RX ORDER — OMEPRAZOLE 40 MG/1
40 CAPSULE, DELAYED RELEASE ORAL DAILY
Status: ON HOLD | COMMUNITY
End: 2021-09-15 | Stop reason: HOSPADM

## 2021-09-12 RX ORDER — DEXAMETHASONE SODIUM PHOSPHATE 10 MG/ML
6 INJECTION INTRAMUSCULAR; INTRAVENOUS DAILY
Status: DISCONTINUED | OUTPATIENT
Start: 2021-09-12 | End: 2021-09-15 | Stop reason: HOSPADM

## 2021-09-12 RX ORDER — ATORVASTATIN CALCIUM 80 MG/1
80 TABLET, FILM COATED ORAL DAILY
COMMUNITY

## 2021-09-12 RX ORDER — METOPROLOL SUCCINATE 50 MG/1
50 TABLET, EXTENDED RELEASE ORAL DAILY
COMMUNITY

## 2021-09-12 RX ORDER — ACETAMINOPHEN 650 MG/1
650 SUPPOSITORY RECTAL EVERY 6 HOURS PRN
Status: DISCONTINUED | OUTPATIENT
Start: 2021-09-12 | End: 2021-09-15 | Stop reason: HOSPADM

## 2021-09-12 RX ORDER — ALPRAZOLAM 0.25 MG/1
0.25 TABLET ORAL DAILY PRN
Status: ON HOLD | COMMUNITY
End: 2021-09-15 | Stop reason: HOSPADM

## 2021-09-12 RX ORDER — INSULIN DEGLUDEC 200 U/ML
44 INJECTION, SOLUTION SUBCUTANEOUS NIGHTLY
COMMUNITY

## 2021-09-12 RX ADMIN — INSULIN LISPRO 2 UNITS: 100 INJECTION, SOLUTION INTRAVENOUS; SUBCUTANEOUS at 22:06

## 2021-09-12 RX ADMIN — ASPIRIN 81 MG: 81 TABLET, COATED ORAL at 09:48

## 2021-09-12 RX ADMIN — ALPRAZOLAM 0.25 MG: 0.25 TABLET ORAL at 22:27

## 2021-09-12 RX ADMIN — FAMOTIDINE 20 MG: 20 TABLET, FILM COATED ORAL at 22:00

## 2021-09-12 RX ADMIN — LOSARTAN POTASSIUM 50 MG: 50 TABLET, FILM COATED ORAL at 14:26

## 2021-09-12 RX ADMIN — WATER 1000 MG: 1 INJECTION INTRAMUSCULAR; INTRAVENOUS; SUBCUTANEOUS at 19:45

## 2021-09-12 RX ADMIN — DOXYCYCLINE HYCLATE 100 MG: 100 CAPSULE ORAL at 22:00

## 2021-09-12 RX ADMIN — DOXYCYCLINE HYCLATE 100 MG: 100 CAPSULE ORAL at 09:58

## 2021-09-12 RX ADMIN — DEXAMETHASONE SODIUM PHOSPHATE 6 MG: 10 INJECTION, SOLUTION INTRAMUSCULAR; INTRAVENOUS at 09:54

## 2021-09-12 RX ADMIN — ALPRAZOLAM 0.25 MG: 0.25 TABLET ORAL at 03:11

## 2021-09-12 RX ADMIN — Medication 1 TABLET: at 14:26

## 2021-09-12 RX ADMIN — INSULIN GLARGINE 35 UNITS: 100 INJECTION, SOLUTION SUBCUTANEOUS at 22:05

## 2021-09-12 RX ADMIN — METOPROLOL TARTRATE 50 MG: 25 TABLET, FILM COATED ORAL at 09:48

## 2021-09-12 RX ADMIN — INSULIN LISPRO 6 UNITS: 100 INJECTION, SOLUTION INTRAVENOUS; SUBCUTANEOUS at 17:31

## 2021-09-12 RX ADMIN — PANTOPRAZOLE SODIUM 40 MG: 40 TABLET, DELAYED RELEASE ORAL at 07:01

## 2021-09-12 RX ADMIN — INSULIN LISPRO 2 UNITS: 100 INJECTION, SOLUTION INTRAVENOUS; SUBCUTANEOUS at 12:43

## 2021-09-12 RX ADMIN — DEXAMETHASONE SODIUM PHOSPHATE 6 MG: 10 INJECTION INTRAMUSCULAR; INTRAVENOUS at 19:44

## 2021-09-12 RX ADMIN — INSULIN LISPRO 6 UNITS: 100 INJECTION, SOLUTION INTRAVENOUS; SUBCUTANEOUS at 10:35

## 2021-09-12 RX ADMIN — FAMOTIDINE 20 MG: 20 TABLET, FILM COATED ORAL at 09:48

## 2021-09-12 RX ADMIN — ATORVASTATIN CALCIUM 80 MG: 20 TABLET, FILM COATED ORAL at 09:48

## 2021-09-12 RX ADMIN — ENOXAPARIN SODIUM 60 MG: 60 INJECTION, SOLUTION INTRAVENOUS; SUBCUTANEOUS at 22:01

## 2021-09-12 RX ADMIN — ESCITALOPRAM OXALATE 10 MG: 10 TABLET ORAL at 14:25

## 2021-09-12 RX ADMIN — ENOXAPARIN SODIUM 60 MG: 60 INJECTION, SOLUTION INTRAVENOUS; SUBCUTANEOUS at 09:59

## 2021-09-12 RX ADMIN — Medication 2000 UNITS: at 09:48

## 2021-09-12 RX ADMIN — INSULIN LISPRO 6 UNITS: 100 INJECTION, SOLUTION INTRAVENOUS; SUBCUTANEOUS at 19:35

## 2021-09-13 LAB
ALBUMIN SERPL-MCNC: 2.9 G/DL (ref 3.5–5.2)
ALP BLD-CCNC: 75 U/L (ref 35–104)
ALT SERPL-CCNC: 95 U/L (ref 0–32)
ANION GAP SERPL CALCULATED.3IONS-SCNC: 8 MMOL/L (ref 7–16)
APTT: 27.7 SEC (ref 24.5–35.1)
AST SERPL-CCNC: 54 U/L (ref 0–31)
BASOPHILS ABSOLUTE: 0.01 E9/L (ref 0–0.2)
BASOPHILS RELATIVE PERCENT: 0.1 % (ref 0–2)
BILIRUB SERPL-MCNC: 0.3 MG/DL (ref 0–1.2)
BUN BLDV-MCNC: 36 MG/DL (ref 6–23)
C-REACTIVE PROTEIN: 1.4 MG/DL (ref 0–0.4)
CALCIUM SERPL-MCNC: 8 MG/DL (ref 8.6–10.2)
CHLORIDE BLD-SCNC: 112 MMOL/L (ref 98–107)
CO2: 21 MMOL/L (ref 22–29)
CREAT SERPL-MCNC: 1.1 MG/DL (ref 0.5–1)
D DIMER: 459 NG/ML DDU
EOSINOPHILS ABSOLUTE: 0 E9/L (ref 0.05–0.5)
EOSINOPHILS RELATIVE PERCENT: 0 % (ref 0–6)
FERRITIN: 55 NG/ML
FIBRINOGEN: >700 MG/DL (ref 225–540)
GFR AFRICAN AMERICAN: 60
GFR NON-AFRICAN AMERICAN: 49 ML/MIN/1.73
GLUCOSE BLD-MCNC: 188 MG/DL (ref 74–99)
HCT VFR BLD CALC: 37.4 % (ref 34–48)
HEMOGLOBIN: 11.5 G/DL (ref 11.5–15.5)
IMMATURE GRANULOCYTES #: 0.03 E9/L
IMMATURE GRANULOCYTES %: 0.3 % (ref 0–5)
INR BLD: 1.1
LACTATE DEHYDROGENASE: 193 U/L (ref 135–214)
LACTIC ACID: 0.7 MMOL/L (ref 0.5–2.2)
LYMPHOCYTES ABSOLUTE: 0.65 E9/L (ref 1.5–4)
LYMPHOCYTES RELATIVE PERCENT: 6.1 % (ref 20–42)
MCH RBC QN AUTO: 29 PG (ref 26–35)
MCHC RBC AUTO-ENTMCNC: 30.7 % (ref 32–34.5)
MCV RBC AUTO: 94.2 FL (ref 80–99.9)
METER GLUCOSE: 132 MG/DL (ref 74–99)
METER GLUCOSE: 142 MG/DL (ref 74–99)
METER GLUCOSE: 164 MG/DL (ref 74–99)
MONOCYTES ABSOLUTE: 0.33 E9/L (ref 0.1–0.95)
MONOCYTES RELATIVE PERCENT: 3.1 % (ref 2–12)
NEUTROPHILS ABSOLUTE: 9.65 E9/L (ref 1.8–7.3)
NEUTROPHILS RELATIVE PERCENT: 90.4 % (ref 43–80)
PDW BLD-RTO: 15.8 FL (ref 11.5–15)
PLATELET # BLD: 271 E9/L (ref 130–450)
PMV BLD AUTO: 10 FL (ref 7–12)
POTASSIUM REFLEX MAGNESIUM: 4.5 MMOL/L (ref 3.5–5)
PROTHROMBIN TIME: 12.9 SEC (ref 9.3–12.4)
RBC # BLD: 3.97 E12/L (ref 3.5–5.5)
SODIUM BLD-SCNC: 141 MMOL/L (ref 132–146)
TOTAL PROTEIN: 6 G/DL (ref 6.4–8.3)
TROPONIN, HIGH SENSITIVITY: 7 NG/L (ref 0–9)
WBC # BLD: 10.7 E9/L (ref 4.5–11.5)

## 2021-09-13 PROCEDURE — 86140 C-REACTIVE PROTEIN: CPT

## 2021-09-13 PROCEDURE — 82962 GLUCOSE BLOOD TEST: CPT

## 2021-09-13 PROCEDURE — 84484 ASSAY OF TROPONIN QUANT: CPT

## 2021-09-13 PROCEDURE — 6370000000 HC RX 637 (ALT 250 FOR IP): Performed by: SPECIALIST

## 2021-09-13 PROCEDURE — 83615 LACTATE (LD) (LDH) ENZYME: CPT

## 2021-09-13 PROCEDURE — XW0DXM6 INTRODUCTION OF BARICITINIB INTO MOUTH AND PHARYNX, EXTERNAL APPROACH, NEW TECHNOLOGY GROUP 6: ICD-10-PCS | Performed by: SPECIALIST

## 2021-09-13 PROCEDURE — 2060000000 HC ICU INTERMEDIATE R&B

## 2021-09-13 PROCEDURE — 83605 ASSAY OF LACTIC ACID: CPT

## 2021-09-13 PROCEDURE — 85384 FIBRINOGEN ACTIVITY: CPT

## 2021-09-13 PROCEDURE — 2580000003 HC RX 258: Performed by: INTERNAL MEDICINE

## 2021-09-13 PROCEDURE — 82728 ASSAY OF FERRITIN: CPT

## 2021-09-13 PROCEDURE — 6370000000 HC RX 637 (ALT 250 FOR IP): Performed by: INTERNAL MEDICINE

## 2021-09-13 PROCEDURE — 80053 COMPREHEN METABOLIC PANEL: CPT

## 2021-09-13 PROCEDURE — 2580000003 HC RX 258

## 2021-09-13 PROCEDURE — 85025 COMPLETE CBC W/AUTO DIFF WBC: CPT

## 2021-09-13 PROCEDURE — 85730 THROMBOPLASTIN TIME PARTIAL: CPT

## 2021-09-13 PROCEDURE — 6360000002 HC RX W HCPCS: Performed by: INTERNAL MEDICINE

## 2021-09-13 PROCEDURE — 85610 PROTHROMBIN TIME: CPT

## 2021-09-13 PROCEDURE — 85378 FIBRIN DEGRADE SEMIQUANT: CPT

## 2021-09-13 PROCEDURE — 2700000000 HC OXYGEN THERAPY PER DAY

## 2021-09-13 RX ORDER — GAUZE BANDAGE 2" X 2"
100 BANDAGE TOPICAL DAILY
Status: DISCONTINUED | OUTPATIENT
Start: 2021-09-13 | End: 2021-09-15 | Stop reason: HOSPADM

## 2021-09-13 RX ORDER — LANOLIN ALCOHOL/MO/W.PET/CERES
50 CREAM (GRAM) TOPICAL DAILY
Status: DISCONTINUED | OUTPATIENT
Start: 2021-09-13 | End: 2021-09-15 | Stop reason: HOSPADM

## 2021-09-13 RX ORDER — ASCORBIC ACID 500 MG
500 TABLET ORAL DAILY
Status: DISCONTINUED | OUTPATIENT
Start: 2021-09-13 | End: 2021-09-15 | Stop reason: HOSPADM

## 2021-09-13 RX ORDER — SODIUM CHLORIDE 0.9 % (FLUSH) 0.9 %
SYRINGE (ML) INJECTION
Status: COMPLETED
Start: 2021-09-13 | End: 2021-09-13

## 2021-09-13 RX ADMIN — ASPIRIN 81 MG: 81 TABLET, COATED ORAL at 09:17

## 2021-09-13 RX ADMIN — PANTOPRAZOLE SODIUM 40 MG: 40 TABLET, DELAYED RELEASE ORAL at 09:17

## 2021-09-13 RX ADMIN — ENOXAPARIN SODIUM 60 MG: 60 INJECTION, SOLUTION INTRAVENOUS; SUBCUTANEOUS at 09:17

## 2021-09-13 RX ADMIN — ATORVASTATIN CALCIUM 80 MG: 20 TABLET, FILM COATED ORAL at 09:17

## 2021-09-13 RX ADMIN — DEXAMETHASONE SODIUM PHOSPHATE 6 MG: 10 INJECTION INTRAMUSCULAR; INTRAVENOUS at 09:17

## 2021-09-13 RX ADMIN — Medication 2000 UNITS: at 09:17

## 2021-09-13 RX ADMIN — FAMOTIDINE 20 MG: 20 TABLET, FILM COATED ORAL at 22:05

## 2021-09-13 RX ADMIN — FAMOTIDINE 20 MG: 20 TABLET, FILM COATED ORAL at 09:17

## 2021-09-13 RX ADMIN — INSULIN LISPRO 1 UNITS: 100 INJECTION, SOLUTION INTRAVENOUS; SUBCUTANEOUS at 22:20

## 2021-09-13 RX ADMIN — THIAMINE HCL TAB 100 MG 100 MG: 100 TAB at 09:17

## 2021-09-13 RX ADMIN — INSULIN LISPRO 6 UNITS: 100 INJECTION, SOLUTION INTRAVENOUS; SUBCUTANEOUS at 09:17

## 2021-09-13 RX ADMIN — DOXYCYCLINE HYCLATE 100 MG: 100 CAPSULE ORAL at 22:05

## 2021-09-13 RX ADMIN — SODIUM CHLORIDE, PRESERVATIVE FREE 10 ML: 5 INJECTION INTRAVENOUS at 19:14

## 2021-09-13 RX ADMIN — ESCITALOPRAM OXALATE 10 MG: 10 TABLET ORAL at 09:17

## 2021-09-13 RX ADMIN — INSULIN LISPRO 2 UNITS: 100 INJECTION, SOLUTION INTRAVENOUS; SUBCUTANEOUS at 09:17

## 2021-09-13 RX ADMIN — DOXYCYCLINE HYCLATE 100 MG: 100 CAPSULE ORAL at 09:17

## 2021-09-13 RX ADMIN — ENOXAPARIN SODIUM 60 MG: 60 INJECTION, SOLUTION INTRAVENOUS; SUBCUTANEOUS at 22:06

## 2021-09-13 RX ADMIN — BARICITINIB 2 MG: 2 TABLET, FILM COATED ORAL at 09:17

## 2021-09-13 RX ADMIN — WATER 1000 MG: 1 INJECTION INTRAMUSCULAR; INTRAVENOUS; SUBCUTANEOUS at 19:13

## 2021-09-13 RX ADMIN — OXYCODONE HYDROCHLORIDE AND ACETAMINOPHEN 500 MG: 500 TABLET ORAL at 09:17

## 2021-09-13 RX ADMIN — ALPRAZOLAM 0.25 MG: 0.25 TABLET ORAL at 22:05

## 2021-09-13 RX ADMIN — METOPROLOL TARTRATE 50 MG: 25 TABLET, FILM COATED ORAL at 12:16

## 2021-09-13 RX ADMIN — LOSARTAN POTASSIUM 50 MG: 50 TABLET, FILM COATED ORAL at 09:17

## 2021-09-13 RX ADMIN — INSULIN GLARGINE 35 UNITS: 100 INJECTION, SOLUTION SUBCUTANEOUS at 22:19

## 2021-09-13 RX ADMIN — PYRIDOXINE HCL TAB 50 MG 50 MG: 50 TAB at 09:17

## 2021-09-13 NOTE — CONSULTS
[facility]  Consult Note    [address] [facility] [address]  [place], [address]  Phone [phone]   [phone]      Admit Date: 2021 12:17 PM  Pt Name: Kay Gold  MRN: [id number]  : 1955  Reason for Consult:    Chief Complaint   Patient presents with    Shortness of Breath     Pt presents today for sob following a postive covid test on  21. pt was 83% on RA. Pt is 90% 6L NC.     Positive For Covid-19     Requesting Physician:  Justina Flores MD  PCP: Justina Flores MD  History Obtained From:  patient, chart   ID consulted for Acute respiratory failure due to COVID-19 ([facility] [U07.1, J96.00]  COVID-19 [U07.1]  on hospital day [address]       Chief Complaint   Patient presents with    Shortness of Breath     Pt presents today for sob following a postive covid test on  21. pt was 83% on RA. Pt is 90% 6L NC.     Positive For Covid-19     HISTORYOF PRESENT ILLNESS   Kay Gold is a 77 y.o. female who presents with   has a past medical history of CAD (coronary artery disease), Chest heaviness, Chronic kidney disease, DM (diabetes mellitus) ([facility] Dyslipidemia, FH: CAD (coronary artery disease), GERD (gastroesophageal reflux disease), Headache, HTN (hypertension), and Neuropathy.    ED TRIAGEVITALS  BP: (!) 124/49, Temp: 97.9 °F (36.6 °C), Pulse: 51, Resp: 16, SpO2: 96 %  HPI  Pt testes +  unvaccinated exposure her family and grandchildren who returned from [facility]   She babysits her grand children  83% RA now on 6L  Pt received Clarnce Jamir x2 and medrol dose amy   Pt respiratory sx started few days PTA  [id number] cr1.3 [id number] ast58  cxry b hazy suggestive interstitial pulmonary edema/pneumonia  She has h/o asthma has cough weakness breathing better   No appetite    REVIEW OF SYSTEMS     CONSTITUTIONAL:   No fever, chills, weight loss  ALLERGIES:    No urticaria, hay fever,    EYES:     No blurry vision, loss of vision, eye pain  ENT:      No hearing loss, sore throat  CARDIOVASCULAR:  No chest pain or palpitations  RESPIRATORY:     cough, sob  ENDOCRINE:    No increase thirst, urination   HEME-LYMPH:   No easy bruising or bleeding  GI:     No nausea, vomiting or diarrhea  :     No urinary complaints  NEURO:    No seizures, stroke, HA  MUSCULOSKELETAL:  No muscle aches or pain, no joint pain  SKIN:     No rash or itch  PSYCH:    No depression or anxiety    Not in a hospital admission.   CURRENT MEDICATIONS     Current Facility-Administered Medications:     acetaminophen (TYLENOL) tablet 650 mg, 650 mg, Oral, Q6H PRN **OR** acetaminophen (TYLENOL) suppository 650 mg, 650 mg, Rectal, Q6H PRN, Jono Huggins MD    dexamethasone (DECADRON) injection 6 mg, 6 mg, IntraVENous, Daily, Jono Huggins MD, 6 mg at 09/12/21 1944    enoxaparin (LOVENOX) injection 60 mg, 60 mg, SubCUTAneous, BID, Jono Huggins MD, 60 mg at 09/12/21 2201    insulin lispro (HUMALOG) injection vial 0-12 Units, 0-12 Units, SubCUTAneous, TID WC, Jono Huggins MD, 6 Units at 09/12/21 1731    insulin lispro (HUMALOG) injection vial 0-6 Units, 0-6 Units, SubCUTAneous, Nightly, Jono Huggins MD, 2 Units at 09/12/21 2206    ALPRAZolam (XANAX) tablet 0.25 mg, 0.25 mg, Oral, Nightly PRN, Jono Huggins MD, 0.25 mg at 09/12/21 2227    aspirin EC tablet 81 mg, 81 mg, Oral, Daily, Jono Huggins MD, 81 mg at 09/12/21 0948    atorvastatin (LIPITOR) tablet 80 mg, 80 mg, Oral, Daily, Jono Huggins MD, 80 mg at 09/12/21 0948    Vitamin D (CHOLECALCIFEROL) tablet 2,000 Units, 2,000 Units, Oral, Daily, Jono Huggins MD, 2,000 Units at 09/12/21 0948    escitalopram (LEXAPRO) tablet 10 mg, 10 mg, Oral, Daily, Jono Huggins MD, 10 mg at 09/12/21 1425    famotidine (PEPCID) tablet 20 mg, 20 mg, Oral, BID, Jono Huggins MD, 20 mg at 09/12/21 2200    losartan (COZAAR) tablet 50 mg, 50 mg, Oral, Daily, Jono Huggins MD, 50 mg at 09/12/21 1427   therapeutic multivitamin-minerals 1 tablet, 1 tablet, Oral, Daily, Imani Almeida MD, 1 tablet at 09/12/21 1426    pantoprazole (PROTONIX) tablet 40 mg, 40 mg, Oral, Daily, Imani Almeida MD, 40 mg at 09/12/21 0701    empagliflozin (JARDIANCE) tablet TABS 10 mg, 10 mg, Oral, Daily, Imani Almeida MD    insulin lispro (HUMALOG) injection vial 6 Units, 6 Units, SubCUTAneous, BID WC, Imani Almeida MD, 6 Units at 09/12/21 1935    metoprolol tartrate (LOPRESSOR) tablet 50 mg, 50 mg, Oral, Daily, Imani Almeida MD, 50 mg at 09/12/21 0948    cefTRIAXone (ROCEPHIN) 1,000 mg in sterile water 10 mL IV syringe, 1,000 mg, IntraVENous, Q24H, Imani Almeida MD, Stopped at 09/12/21 2228    doxycycline hyclate (VIBRAMYCIN) capsule 100 mg, 100 mg, Oral, 2 times per day, Imani Almeida MD, 100 mg at 09/12/21 2200    insulin glargine (LANTUS) injection vial 35 Units, 35 Units, SubCUTAneous, Nightly, Imani Almeida MD, 35 Units at 09/12/21 2205    Current Outpatient Medications:     ALPRAZolam (XANAX) 0.25 MG tablet, Take 0.25 mg by mouth daily as needed for Anxiety. , Disp: , Rfl:     atorvastatin (LIPITOR) 80 MG tablet, Take 80 mg by mouth daily, Disp: , Rfl:     Insulin Degludec (TRESIBA FLEXTOUCH) 200 UNIT/ML SOPN, Inject 44 Units into the skin nightly, Disp: , Rfl:     metoprolol succinate (TOPROL XL) 50 MG extended release tablet, Take 50 mg by mouth daily, Disp: , Rfl:     omeprazole (PRILOSEC) 40 MG delayed release capsule, Take 40 mg by mouth daily, Disp: , Rfl:     methylPREDNISolone (MEDROL DOSEPACK) 4 MG tablet, Take 4 mg by mouth See Admin Instructions Take by mouth., Disp: , Rfl:     albuterol sulfate HFA (VENTOLIN HFA) 108 (90 Base) MCG/ACT inhaler, Inhale 2 puffs into the lungs every 6 hours as needed for Wheezing or Shortness of Breath, Disp: , Rfl:     famotidine (PEPCID) 20 MG tablet, Take 20 mg by mouth daily , Disp: , Rfl:     escitalopram (LEXAPRO) 10 MG tablet, Take 10 mg by mouth daily, Disp: , Rfl:     losartan (COZAAR) 50 MG tablet, Take 50 mg by mouth daily, Disp: , Rfl:     empagliflozin (JARDIANCE) 10 MG tablet, Take 10 mg by mouth daily, Disp: , Rfl:     ALPRAZolam (XANAX) 0.25 MG tablet, Take 0.25 mg by mouth nightly. , Disp: , Rfl:     insulin lispro (HUMALOG) 100 UNIT/ML injection vial, Inject 7 Units into the skin 3 times daily (before meals) , Disp: , Rfl:     Cholecalciferol (VITAMIN D3) 2000 UNITS CAPS, Take 2,000 Units by mouth daily, Disp: , Rfl:     Multiple Vitamins-Minerals (MULTIVITAMIN ADULT PO), Take 40 mg by mouth daily , Disp: , Rfl:     aspirin EC 81 MG EC tablet, Take 1 tablet by mouth daily, Disp: 30 tablet, Rfl: 3  ALLERGIES     Dye [iodides] and Gabapentin  Immunization History   Administered Date(s) Administered    Pneumococcal Conjugate 13-valent ([id number]) 2015    Td, unspecified formulation 2008      Internal Administration   First Dose      Second Dose           Last COVID Lab SARS-CoV-2, NAAT (no units)   Date Value   2021 DETECTED (A)          . cov  PAST MEDICAL HISTORY     Past Medical History:   Diagnosis Date    CAD (coronary artery disease)     Chest heaviness 3/8/2016    Chronic kidney disease     DM [facility]di[facility] mellitus) [facility]     Dyslipidemia     FH: CAD (coronary artery disease)     GERD (gastroesophageal reflux disease)     Headache 3/8/2016    HTN (hypertension)     Neuropathy      SURGICAL HISTORY       Past Surgical History:   Procedure Laterality Date    CERVICAL SPINE SURGERY       SECTION      2     CHOLECYSTECTOMY      GALLBLADDER SURGERY      GASTRIC BYPASS SURGERY      8 YRS     FAMILY HISTORY       Family History   Problem Relation Age of Onset    Heart Disease Mother     High Blood Pressure Mother     Cancer Father         lung and prostate    Cancer Sister         lung    Cancer Brother         lung    Cancer Sister         lung     SOCIAL HISTORY       Social History Socioeconomic History    Marital status:      Spouse name: Not on file    Number of children: 2    Years of education: GED    Highest education level: Not on file   Occupational History    Occupation: [employer] (unemployed)   Tobacco Use    Smoking status: Never Smoker    Smokeless tobacco: Never Used   Substance and Sexual Activity    Alcohol use: No     Alcohol/week: 0.0 standard drinks    Drug use: No    Sexual activity: Never   Other Topics Concern    Not on file   Social History Narrative    Not on file     Social Determinants of Health     Financial Resource Strain:     Difficulty of Paying Living Expenses:    Food Insecurity:     Worried About Running Out of Food in the Last Year:     [address] in the Last Year:    Transportation Needs:     Lack of Transportation (Medical):      Lack of Transportation (Non-Medical):    Physical Activity:     Days of Exercise per Week:     Minutes of Exercise per Session:    Stress:     Feeling of Stress :    Social Connections:     Frequency of Communication with Friends and Family:     Frequency of Social Gatherings with Friends and Family:     Attends [employer] Services:     Active Member of Clubs or Organizations:     Attends Club or Organization Meetings:     Marital Status:    Intimate Partner Violence:     Fear of Current or Ex-Partner:     Emotionally Abused:     Physically Abused:     Sexually Abused:      ·  nurses aid    PHYSICAL EXAM       ED Triage Vitals   BP Temp Temp Source Pulse Resp SpO2 Height Weight   09/11/21 1218 09/11/21 1222 09/11/21 1222 09/11/21 1218 09/11/21 1218 09/11/21 1218 09/11/21 2005 09/11/21 2005   114/60 98.5 °F (36.9 °C) Temporal 82 22 92 % 5' 2\" (1.575 m) 200 lb (90.7 kg)     Vitals:    Vitals:    09/12/21 2020 09/13/21 0029 09/13/21 0319 09/13/21 0456   BP: (!) 148/80 123/68 (!) 132/57 (!) 124/49   Pulse: 57 52 52 51   Resp: 22 19 17 16   Temp:       TempSrc:       SpO2: 96% 95% 99% 96%   Weight: Height:         Physical Exam   Constitutional/General: Alert and oriented, NAD inc bmi  Head: NC/AT  Eyes: PERRL, EOMI  Mouth: Normal mucosa, no thrush   Neck: Supple, full ROM,    Pulmonary: Lungs dec to auscultation bilaterally. Not in respiratory distress 5L o2 sat >95%  Cardiovascular:  Regular rate and rhythm, no murmurs, gallops, or rubs. Abdomen: Soft, + BS.  distension. Nontender. Extremities: Moves all extremities x 4. Warm and well perfused  Pulses:  Distal pulses intact  Skin: Warm and dry without rash  Neurologic:    No focal deficits  Psych: Normal Affect     DIAGNOSTIC RESULTS   RADIOLOGY:   CT CHEST WO CONTRAST    Result Date: 9/11/2021  EXAMINATION: CT OF THE CHEST WITHOUT CONTRAST 9/11/2021 4:12 pm TECHNIQUE: CT of the chest was performed without the administration of intravenous contrast. Multiplanar reformatted images are provided for review. Dose modulation, iterative reconstruction, and/or weight based adjustment of the mA/kV was utilized to reduce the radiation dose to as low as reasonably achievable. COMPARISON: October 11, 2019 HISTORY: ORDERING SYSTEM PROVIDED HISTORY: Severe hypoxia, COVID-19 pneumonia TECHNOLOGIST PROVIDED HISTORY: Reason for exam:->Severe hypoxia, COVID-19 pneumonia Decision Support Exception - unselect if not a suspected or confirmed emergency medical condition->Emergency Medical Condition (MA) FINDINGS: The heart and the great vessels are normal.  There is a tiny pericardial effusion. There is coronary artery calcification. Moderate mediastinal lymph nodes are present. Trachea and major bronchi are patent. There are multifocal patchy and diffuse ground-glass opacities bilaterally. There is no pleural effusion. Liver is of normal architecture. Gastric bypass surgery is noted. Degenerative changes are identified in the lower cervical and thoracic spine.      Diffuse multifocal patchy and ground-glass infiltrates bilaterally concerning for multifocal pneumonia/viral infection. XR CHEST PORTABLE    Result Date: 9/11/2021  EXAMINATION: ONE XRAY VIEW OF THE CHEST 9/11/2021 1:16 pm COMPARISON: None. HISTORY: ORDERING SYSTEM PROVIDED HISTORY: Suspected COVID-19 pneumonia, crackles bilateral bases. TECHNOLOGIST PROVIDED HISTORY: Reason for exam:->Suspected COVID-19 pneumonia, crackles bilateral bases. FINDINGS: Interstitial and hazy opacities bilaterally notable in perihilar locations in lung bases. No pleural effusion. The heart is normal size. No pneumothorax. Interstitial and hazy opacities bilaterally suggestive of interstitial pulmonary edema or bilateral pneumonia. LABS  Recent Labs     09/11/21 1247 09/13/21  0536   WBC 14.0* 10.7   HGB 12.5 11.5   HCT 38.9 37.4   MCV 90.7 94.2    271     Recent Labs     09/11/21  1247 09/11/21  1247 09/11/21  2118 09/13/21  0536     --   --  141   K 4.5   < >  --  4.5   *   < >  --  112*   CO2 23   < >  --  21*   BUN 32*  --   --  36*   CREATININE 1.3*  --   --  1.1*   GFRAA 49  --   --  60   LABGLOM 41  --   --  49   GLUCOSE 118*  --  303 188*   PROT 7.3  --   --  6.0*   LABALBU 4.0  --   --  2.9*   CALCIUM 9.3  --   --  8.0*   BILITOT 0.5  --   --  0.3   ALKPHOS 95  --   --  75   AST 58*  --   --  54*   *  --   --  95*    < > = values in this interval not displayed.      Recent Labs     09/11/21 1247 09/12/21  0523   PROCAL 0.11* 0.17*     Lab Results   Component Value Date    CRP 5.1 (H) 09/12/2021    CRP 1.5 (H) 09/11/2021    CRP 0.2 10/07/2019     Lab Results   Component Value Date    SEDRATE 19 10/07/2019    SEDRATE 20 09/21/2015    SEDRATE 16 08/10/2015     No results found for: [id number]  Lab Results   Component Value Date    COVID19 DETECTED 09/11/2021     COVID-19/IMCHAEL-COV2 LABS  Recent Labs     09/11/21  1247 09/12/21  0523 09/13/21  0536   CRP 1.5* 5.1*  --    PROCAL 0.11* 0.17*  --    FERRITIN 80  --   --    *  --  193   DDIMER 336 022 579   FIBRINOGEN >700*  -- >700*   INR  --   --  1.1   PROTIME  --   --  12.9*   AST 58*  --  54*   *  --  95*     Lab Results   Component Value Date    CHOL 156 07/02/2020    TRIG 140 07/02/2020    HDL 60 07/02/2020    LDLCALC 68 07/02/2020    LABVLDL 28 07/02/2020        MICROBIOLOGY:          FINAL IMPRESSION    Patient is a 77 y.o. female who presented with   Chief Complaint   Patient presents with    Shortness of Breath     Pt presents today for sob following a postive covid test on  8/26/21. pt was 83% on RA. Pt is 90% 6L NC.     Positive For Covid-19    and admitted for Acute respiratory failure due to COVID-19 (HCC) [U07.1, J96.00]  COVID-19 [U07.1]  leukocytosis  shraddha better     Encouraged proning deep breathing  Dec o2 to 3L o2 sat 96%         dexamethasone (DECADRON) injection 6 mg, Daily  enoxaparin (LOVENOX) injection 60 mg, BID     Vitamin D (CHOLECALCIFEROL) tablet 2,000 Units, Daily     cefTRIAXone (ROCEPHIN) 1,000 mg in sterile water 10 mL IV syringe, Q24H  doxycycline hyclate (VIBRAMYCIN) capsule 100 mg, 2 times per day       baricitinib  melatonin  covid biomarkers    Imaging and labs were reviewed per medical records and any ID pertinent labs were addressed with the patient. The patient/FAMILY  was educated about the diagnosis, prognosis, indications, risks and benefits of treatment. An opportunity to ask questions was given to the patient/FAMILY and questions were answered. Thank you for involving me in the care of [employer]. Please do not hesitate to call for any questions or concerns.          Electronically signed by Tasha Hemphill MD on 9/13/2021 at 7:02 AM

## 2021-09-13 NOTE — PROGRESS NOTES
Patient ID:  Radha Bermudez  [id number]  89 y.o.  1955    HPI:  Patient dry cough, shortness of breath was admitted with O2 saturation 83% at home patient with COVID-19 pneumonia now about 2 weeks, patient also postmenopausal bleeding was awaiting to get endometrial biopsy ultrasound of uterus had showed uterine lining thickening. Questions, answers, and tests reviewed. ROS:  Cardiovascular:   Denies any chest pain, irregular heartbeats, or palpitations. Respiratory:   Denies , sputum production, hemoptysis, or wheezing. Gastrointestinal:   Denies nausea, vomiting, diarrhea, or constipation. Denies any abdominal pain. Extremities:   Denies any lower extremity swelling or edema. Neurology:    Denies any headache or focal neurological deficits. No weakness or paresthesia. Derm:    Denies any rashes, ulcers, or excoriations. Denies bruising. Genitourinary:    Denies any urgency, frequency, hematuria. Voiding without difficulty. Physical Exam:    Vitals:    09/13/21 1624   BP: 125/73   Pulse: 57   Resp: 17   Temp:    SpO2: 93%       HEENT:  PERRLA. EOMI. Sclera clear. Buccal mucosa moist.    Neck:  Supple. Trachea midline. No thyromegaly. No JVD. No bruits. Heart:  Rhythm regular, rate controlled. No murmurs. Lungs:  Symmetrical.  Bilateral crackles. No wheezes. Presence of rhonchi. No rales. Abdomen: Soft. Non-tender. Non-distended. Bowel sounds positive. No organomegaly or masses. No pain on palpation    Extremities:  Peripheral pulses present. No peripheral edema. No ulcers. Neurologic:  Alert x 3. No focal deficit. Cranial nerves grossly intact. Skin:  No petechia. No hemorrhage. No wounds.     Labs:  CBC:   Lab Results   Component Value Date    WBC 10.7 09/13/2021    RBC 3.97 09/13/2021    RBC 4.18 05/16/2012    HGB 11.5 09/13/2021    HCT 37.4 09/13/2021    MCV 94.2 09/13/2021    MCH 29.0 09/13/2021    MCHC 30.7 09/13/2021    RDW 15.8 09/13/2021     09/13/2021 Infusion Medications:      Assessment:   Patient Active Problem List    Diagnosis Date Noted    Acute respiratory failure due to COVID-19 ([facility] 09/11/2021    COVID-19 09/11/2021    Chest heaviness 03/08/2016    Headache 03/08/2016    HNP (herniated nucleus pulposus) with myelopathy, cervical 01/14/2016    CKD stage 3 due to type 2 diabetes mellitus ([facility] 10/30/2015    Neuropathy due to type 2 diabetes mellitus ([facility] 10/30/2015    Vitamin D deficiency 10/30/2015    Neurologic gait dysfunction 10/28/2015    CAD (coronary artery disease)     HTN (hypertension)     Dyslipidemia     Type 2 diabetes mellitus with diabetic chronic kidney disease (HCC)     GERD (gastroesophageal reflux disease)     FH: CAD (coronary artery disease)     Displacement of intervertebral disc 07/09/2012    Lumbosacral radiculopathy 07/09/2012    Spondylosis of lumbosacral region 07/09/2012    Herniation of nucleus pulposus 07/09/2012         Plan: 1. Acute respiratory failure with hypoxia secondary to COVID-19 pneumonia patient on IV steroid. 2.  Diabetes mellitus with renal insufficiency and diabetic neuropathy continue with insulin monitor blood sugar as patient on IV steroids. 3.  Postmenopausal bleeding patient has ultrasound showing endometrial thickening awaiting endometrial biopsy. 4.  Hypertension continue present antihypertensives. Continue current therapy. See orders for further plan of care. Completed By:  Dr. Anna Ansari MD, [address]           Electronically signed by Eloy Smith MD

## 2021-09-13 NOTE — ED NOTES
Patient awake and alert sitting at bedside. Patient provided beside table, room cleaned. Ice water new cup and straw provided. Patient has no further needs.       Yola Moncada RN  09/13/21 4032

## 2021-09-13 NOTE — CARE COORDINATION
SS Note: Covid Positive. Pt boarded in ED, presented for sob. She tested Covid + 21. Pt was 83% on RA. Pt is 90% 6L NC and currently @ 4L. Pt found to have Pneumonia due to 2019-nCoV Worsening. SW met w/pt in ED 15 and spoke from door wearing mask/PPE and explained role. Pt lives alone in [facility]; apartments for seniors. She is on 5th floor but there is elevator. PTA, pt is independent in ADL's, drives and has insurance. PCP is Paulo Hauser. Pt has following DME: cane if needed and pulse ox. Denies hx of HHC or MICHAEL. No hx of 02. Pt is Diabetic and has Glucometer and has asthma- uses inhalers. Her insurance covers all meds. Pt reports she has heart failure and worked in [employer] for years. SW attempted to completed ACP w/pt and she is not sure of her code status @ this time. Thus, only completed primary decision maker. She believes she wants to be DNR but wants to talk to her 2 sons and PCP 1st. She notes he   . JENNIFER suggests a POA be completed naming pt's son Kemar Vasquez as her agent since she names him as her primary decision maker. This POA will meet state-specific requirements to allow Jonnathan Stuart to act on the patient's behalf when appropriate. Pt declined. LSW explained since there are multiple children, there needs to be a majority of consensus for decision to be made and pt voiced understanding. She did take copy of POA as well as LW and she may consider completing in future. JENNIFER notes SW/CM can assist @ anytime while she is here. Pt identifies no needs when she goes home. Advance Care Planning   Healthcare Decision Maker:    Primary Decision Maker: Audrey Ochoa Child - [phone]    Secondary Decision Maker: Sydnie Farley - Child - [phone]    Click here to complete Healthcare Decision Makers including selection of the Healthcare Decision Maker Relationship (ie \"Primary\"). Today we documented Decision Maker(s) consistent with Legal Next of Kin hierarchy. Electronically signed by CAITLYN Bailey on 9/13/2021 at 5:43 PM

## 2021-09-13 NOTE — H&P
Department of Internal Medicine            CHIEF COMPLAINT:  sob    HISTORY OF PRESENT ILLNESS:      This is a came in er due to sob. She is unvaccinated for covid 19 and was infected with covid 17 days ago. Pt cont have more sob and chest pain to breath. Her sat drop to 85 when she was at home. She is currently on 6 liter of oxygen. PAST MEDICAL Hx:  Past Medical History:   Diagnosis Date    CAD (coronary artery disease)     Chest heaviness 3/8/2016    Chronic kidney disease     DM [facility]diabetes mellitus) ([facility] [address] [facility]     Dyslipidemia     FH: CAD (coronary artery disease)     GERD (gastroesophageal reflux disease)     Headache 3/8/2016    HTN (hypertension)     Neuropathy        PAST SURGICAL Hx:   Past Surgical History:   Procedure Laterality Date    CERVICAL SPINE SURGERY       SECTION      2     CHOLECYSTECTOMY      GALLBLADDER SURGERY      GASTRIC BYPASS SURGERY      8 YRS       FAMILY Hx:  Family History   Problem Relation Age of Onset    Heart Disease Mother     High Blood Pressure Mother     Cancer Father         lung and prostate    Cancer Sister         lung    Cancer Brother         lung    Cancer Sister         lung       HOME MEDICATIONS:  Prior to Admission medications    Medication Sig Start Date End Date Taking? Authorizing Provider   ALPRAZolam (XANAX) 0.25 MG tablet Take 0.25 mg by mouth daily as needed for Anxiety.    Yes Historical Provider, MD   atorvastatin (LIPITOR) 80 MG tablet Take 80 mg by mouth daily   Yes Historical Provider, MD   Insulin Degludec (TRESIBA FLEXTOUCH) 200 UNIT/ML SOPN Inject 44 Units into the skin nightly   Yes Historical Provider, MD   metoprolol succinate (TOPROL XL) 50 MG extended release tablet Take 50 mg by mouth daily   Yes Historical Provider, MD   omeprazole (PRILOSEC) 40 MG delayed release capsule Take 40 mg by mouth daily   Yes Historical Provider, MD   methylPREDNISolone (MEDROL DOSEPACK) 4 MG tablet Take 4 mg by mouth See Admin in the Last Year:    [address] in the Last Year:    Transportation Needs:     Lack of Transportation (Medical):  Lack of Transportation (Non-Medical):    Physical Activity:     Days of Exercise per Week:     Minutes of Exercise per Session:    Stress:     Feeling of Stress :    Social Connections:     Frequency of Communication with Friends and Family:     Frequency of Social Gatherings with Friends and Family:     Attends [employer] Services:     Active Member of Clubs or Organizations:     Attends Club or Organization Meetings:     Marital Status:    Intimate Partner Violence:     Fear of Current or Ex-Partner:     Emotionally Abused:     Physically Abused:     Sexually Abused:        ROS:  General:   Denies chills, fatigue, fever, malaise, night sweats or weight loss    Psychological:   Denies anxiety, disorientation or hallucinations    ENT:    Denies epistaxis, headaches, vertigo or visual changes    Cardiovascular:   Denies any chest pain, irregular heartbeats, or palpitations. No paroxysmal nocturnal dyspnea. Respiratory:   Sob, cough, pain with deep breath    Gastrointestinal:   Denies nausea, vomiting, diarrhea, or constipation. Denies any abdominal pain. Denies change in bowel habits or stools. Genito-Urinary:    Denies any urgency, frequency, hematuria. Voiding without difficulty. Musculoskeletal:   Denies joint pain, joint stiffness, joint swelling or muscle pain    Neurology:    Denies any headache or focal neurological deficits. No weakness or paresthesia. Derm:    Denies any rashes, ulcers, or excoriations. Denies bruising. Extremities:   Denies any lower extremity swelling or edema.             PHYSICAL EXAM:  VITALS:  Vitals:    09/12/21 2020   BP: (!) 148/80   Pulse: 57   Resp: 22   Temp:    SpO2: 96%         CONSTITUTIONAL:    Awake, alert, cooperative, no apparent distress, and appears stated age    EYES:    PERRL, EOMI, sclera clear, conjunctiva normal    ENT:    Normocephalic, atraumatic, sinuses nontender on palpation. External ears without lesions. Oral pharynx with moist mucus membranes. Tonsils without erythema or exudates. NECK:    Supple, symmetrical, trachea midline, no adenopathy, thyroid symmetric, not enlarged and no tenderness, skin normal, no bruits, no JVD    HEMATOLOGIC/LYMPHATICS:    No cervical lymphadenopathy and no supraclavicular lymphadenopathy    LUNGS:    Diffuse crackles  CARDIOVASCULAR:    Normal apical impulse, regular rate and rhythm, normal S1 and S2, no S3 or S4, and no murmur noted    ABDOMEN:    No scars, normal bowel sounds, soft, non-distended, non-tender, no masses palpated, no hepatosplenomegaly, no rebound or guarding elicited on palpation     MUSCULOSKELETAL:    There is no redness, warmth, or swelling of the joints. Full range of motion noted. Motor strength is 5 out of 5 all extremities bilaterally. Tone is normal.    NEUROLOGIC:    Awake, alert, oriented to name, place and time. Cranial nerves II-XII are grossly intact. Motor is 5 out of 5 bilaterally. SKIN:    No bruising or bleeding. No redness, warmth, or swelling    EXTREMITIES:    Peripheral pulses present. No edema, cyanosis, or swelling.       LABORATORY DATA:  CBC:   Lab Results   Component Value Date    WBC 14.0 09/11/2021    RBC 4.29 09/11/2021    RBC 4.18 05/16/2012    HGB 12.5 09/11/2021    HCT 38.9 09/11/2021    MCV 90.7 09/11/2021    MCH 29.1 09/11/2021    MCHC 32.1 09/11/2021    RDW 16.0 09/11/2021     09/11/2021    MPV 9.5 09/11/2021     CMP:    Lab Results   Component Value Date     09/11/2021    K 4.5 09/11/2021     09/11/2021    CO2 23 09/11/2021    BUN 32 09/11/2021    CREATININE 1.3 09/11/2021    GFRAA 49 09/11/2021    LABGLOM 41 09/11/2021    GLUCOSE 303 09/11/2021    GLUCOSE 147 05/16/2012    PROT 7.3 09/11/2021    LABALBU 4.0 09/11/2021    LABALBU 4.2 05/16/2012    CALCIUM 9.3 09/11/2021    BILITOT 0.5 09/11/2021    ALKPHOS 95 09/11/2021    AST 58 09/11/2021     09/11/2021     Troponin:    Lab Results   Component Value Date    TROPONINI <0.01 09/01/2016     FERRITIN:    Lab Results   Component Value Date    FERRITIN 80 09/11/2021     Fibrinogen Level:  No components found for: FIB    ASSESSMENT:  Patient Active Problem List   Diagnosis    CAD (coronary artery disease)    HTN (hypertension)    Dyslipidemia    Type 2 diabetes mellitus with diabetic chronic kidney disease ([facility] [address]    GERD (gastroesophageal reflux disease)    FH: CAD (coronary artery disease)    Displacement of intervertebral disc    Lumbosacral radiculopathy    Spondylosis of lumbosacral region    Neurologic gait dysfunction    CKD stage 3 due to type 2 diabetes mellitus ([facility] [address]    Neuropathy due to type 2 diabetes mellitus (HCC)    Vitamin D deficiency    HNP (herniated nucleus pulposus) with myelopathy, cervical    Herniation of nucleus pulposus    Chest heaviness    Headache    Acute respiratory failure due to COVID-19 ([facility] [address]    COVID-19       PLAN:  Iv decadron, iv rocephine, doxycycline. ID consult. Monitor blood sugar since pt is on decadron.   dvt prophylaxis from thrombosis from ángel Cason MD, M.D.  8:29 PM  9/12/2021

## 2021-09-14 LAB
C-REACTIVE PROTEIN: 1.1 MG/DL (ref 0–0.4)
D DIMER: <200 NG/ML DDU
FIBRINOGEN: >700 MG/DL (ref 225–540)
INR BLD: 1.1
LACTATE DEHYDROGENASE: 362 U/L (ref 135–214)
LACTIC ACID: 0.8 MMOL/L (ref 0.5–2.2)
METER GLUCOSE: 189 MG/DL (ref 74–99)
METER GLUCOSE: 232 MG/DL (ref 74–99)
METER GLUCOSE: 249 MG/DL (ref 74–99)
METER GLUCOSE: 79 MG/DL (ref 74–99)
PROCALCITONIN: 0.08 NG/ML (ref 0–0.08)
PROTHROMBIN TIME: 13.2 SEC (ref 9.3–12.4)
SEDIMENTATION RATE, ERYTHROCYTE: 53 MM/HR (ref 0–20)
TOTAL CK: 68 U/L (ref 20–180)
TROPONIN, HIGH SENSITIVITY: 12 NG/L (ref 0–9)

## 2021-09-14 PROCEDURE — 2580000003 HC RX 258: Performed by: INTERNAL MEDICINE

## 2021-09-14 PROCEDURE — 82962 GLUCOSE BLOOD TEST: CPT

## 2021-09-14 PROCEDURE — 6370000000 HC RX 637 (ALT 250 FOR IP): Performed by: SPECIALIST

## 2021-09-14 PROCEDURE — 85378 FIBRIN DEGRADE SEMIQUANT: CPT

## 2021-09-14 PROCEDURE — 94664 DEMO&/EVAL PT USE INHALER: CPT

## 2021-09-14 PROCEDURE — 2700000000 HC OXYGEN THERAPY PER DAY

## 2021-09-14 PROCEDURE — 6370000000 HC RX 637 (ALT 250 FOR IP): Performed by: INTERNAL MEDICINE

## 2021-09-14 PROCEDURE — 84484 ASSAY OF TROPONIN QUANT: CPT

## 2021-09-14 PROCEDURE — 84145 PROCALCITONIN (PCT): CPT

## 2021-09-14 PROCEDURE — 85610 PROTHROMBIN TIME: CPT

## 2021-09-14 PROCEDURE — 85384 FIBRINOGEN ACTIVITY: CPT

## 2021-09-14 PROCEDURE — 2060000000 HC ICU INTERMEDIATE R&B

## 2021-09-14 PROCEDURE — 83615 LACTATE (LD) (LDH) ENZYME: CPT

## 2021-09-14 PROCEDURE — 85651 RBC SED RATE NONAUTOMATED: CPT

## 2021-09-14 PROCEDURE — 86140 C-REACTIVE PROTEIN: CPT

## 2021-09-14 PROCEDURE — 6360000002 HC RX W HCPCS: Performed by: INTERNAL MEDICINE

## 2021-09-14 PROCEDURE — 82550 ASSAY OF CK (CPK): CPT

## 2021-09-14 PROCEDURE — 83605 ASSAY OF LACTIC ACID: CPT

## 2021-09-14 PROCEDURE — 36415 COLL VENOUS BLD VENIPUNCTURE: CPT

## 2021-09-14 RX ORDER — BUDESONIDE AND FORMOTEROL FUMARATE DIHYDRATE 80; 4.5 UG/1; UG/1
1 AEROSOL RESPIRATORY (INHALATION) 2 TIMES DAILY
Status: DISCONTINUED | OUTPATIENT
Start: 2021-09-14 | End: 2021-09-15 | Stop reason: HOSPADM

## 2021-09-14 RX ORDER — ALPRAZOLAM 0.25 MG/1
0.25 TABLET ORAL EVERY 6 HOURS PRN
Status: DISCONTINUED | OUTPATIENT
Start: 2021-09-14 | End: 2021-09-15 | Stop reason: HOSPADM

## 2021-09-14 RX ADMIN — DOXYCYCLINE HYCLATE 100 MG: 100 CAPSULE ORAL at 21:38

## 2021-09-14 RX ADMIN — ATORVASTATIN CALCIUM 80 MG: 20 TABLET, FILM COATED ORAL at 08:42

## 2021-09-14 RX ADMIN — INSULIN LISPRO 2 UNITS: 100 INJECTION, SOLUTION INTRAVENOUS; SUBCUTANEOUS at 21:40

## 2021-09-14 RX ADMIN — INSULIN LISPRO 4 UNITS: 100 INJECTION, SOLUTION INTRAVENOUS; SUBCUTANEOUS at 16:36

## 2021-09-14 RX ADMIN — DOXYCYCLINE HYCLATE 100 MG: 100 CAPSULE ORAL at 08:45

## 2021-09-14 RX ADMIN — BARICITINIB 2 MG: 2 TABLET, FILM COATED ORAL at 08:43

## 2021-09-14 RX ADMIN — ENOXAPARIN SODIUM 60 MG: 60 INJECTION, SOLUTION INTRAVENOUS; SUBCUTANEOUS at 08:42

## 2021-09-14 RX ADMIN — INSULIN LISPRO 2 UNITS: 100 INJECTION, SOLUTION INTRAVENOUS; SUBCUTANEOUS at 11:47

## 2021-09-14 RX ADMIN — ESCITALOPRAM OXALATE 10 MG: 10 TABLET ORAL at 08:42

## 2021-09-14 RX ADMIN — Medication 1 TABLET: at 08:42

## 2021-09-14 RX ADMIN — INSULIN LISPRO 6 UNITS: 100 INJECTION, SOLUTION INTRAVENOUS; SUBCUTANEOUS at 08:51

## 2021-09-14 RX ADMIN — INSULIN GLARGINE 35 UNITS: 100 INJECTION, SOLUTION SUBCUTANEOUS at 21:40

## 2021-09-14 RX ADMIN — METOPROLOL TARTRATE 50 MG: 25 TABLET, FILM COATED ORAL at 08:41

## 2021-09-14 RX ADMIN — THIAMINE HCL TAB 100 MG 100 MG: 100 TAB at 08:44

## 2021-09-14 RX ADMIN — BUDESONIDE AND FORMOTEROL FUMARATE DIHYDRATE 1 PUFF: 80; 4.5 AEROSOL RESPIRATORY (INHALATION) at 17:53

## 2021-09-14 RX ADMIN — ATORVASTATIN CALCIUM 80 MG: 20 TABLET, FILM COATED ORAL at 08:43

## 2021-09-14 RX ADMIN — DEXAMETHASONE SODIUM PHOSPHATE 6 MG: 10 INJECTION INTRAMUSCULAR; INTRAVENOUS at 08:42

## 2021-09-14 RX ADMIN — THIAMINE HCL TAB 100 MG 100 MG: 100 TAB at 08:42

## 2021-09-14 RX ADMIN — FAMOTIDINE 20 MG: 20 TABLET, FILM COATED ORAL at 08:42

## 2021-09-14 RX ADMIN — ALPRAZOLAM 0.25 MG: 0.25 TABLET ORAL at 08:41

## 2021-09-14 RX ADMIN — ENOXAPARIN SODIUM 60 MG: 60 INJECTION, SOLUTION INTRAVENOUS; SUBCUTANEOUS at 21:37

## 2021-09-14 RX ADMIN — OXYCODONE HYDROCHLORIDE AND ACETAMINOPHEN 500 MG: 500 TABLET ORAL at 08:42

## 2021-09-14 RX ADMIN — WATER 1000 MG: 1 INJECTION INTRAMUSCULAR; INTRAVENOUS; SUBCUTANEOUS at 21:37

## 2021-09-14 RX ADMIN — INSULIN LISPRO 6 UNITS: 100 INJECTION, SOLUTION INTRAVENOUS; SUBCUTANEOUS at 18:12

## 2021-09-14 RX ADMIN — PYRIDOXINE HCL TAB 50 MG 50 MG: 50 TAB at 08:45

## 2021-09-14 RX ADMIN — PANTOPRAZOLE SODIUM 40 MG: 40 TABLET, DELAYED RELEASE ORAL at 05:30

## 2021-09-14 RX ADMIN — FAMOTIDINE 20 MG: 20 TABLET, FILM COATED ORAL at 21:38

## 2021-09-14 RX ADMIN — ALPRAZOLAM 0.25 MG: 0.25 TABLET ORAL at 21:38

## 2021-09-14 RX ADMIN — Medication 2000 UNITS: at 08:42

## 2021-09-14 RX ADMIN — LOSARTAN POTASSIUM 50 MG: 50 TABLET, FILM COATED ORAL at 08:45

## 2021-09-14 RX ADMIN — ASPIRIN 81 MG: 81 TABLET, COATED ORAL at 08:41

## 2021-09-14 RX ADMIN — Medication 1 TABLET: at 08:44

## 2021-09-14 RX ADMIN — ACETAMINOPHEN 650 MG: 325 TABLET ORAL at 04:04

## 2021-09-14 ASSESSMENT — PAIN SCALES - GENERAL
PAINLEVEL_OUTOF10: 0
PAINLEVEL_OUTOF10: 0
PAINLEVEL_OUTOF10: 5

## 2021-09-14 ASSESSMENT — PAIN DESCRIPTION - PAIN TYPE: TYPE: CHRONIC PAIN

## 2021-09-14 ASSESSMENT — PAIN DESCRIPTION - LOCATION: LOCATION: NECK

## 2021-09-14 NOTE — PROGRESS NOTES
[facility]  [facility]  Progress Note    NAME: Jina Sneed  MR:  [id number]  :   1955  DATE OF SERVICE:21    This is a face to face encounter with Jina Sneed 77 y.o. female on 21    CHIEF COMPLAINT     ID following for   Chief Complaint   Patient presents with    Shortness of Breath     Pt presents today for sob following a postive covid test on  21. pt was 83% on RA. Pt is 90% 6L NC.     Positive For Covid-19     HISTORY OF PRESENT ILLNESS   Pt seen and examined  21    ON SIDE OF BED FEELING BETTER  Patient is tolerating medications. No reported adverse drug reactions. REVIEW OF SYSTEMS     As stated above in the chief complaint, otherwise negative.   CURRENT MEDICATIONS      budesonide-formoterol  1 puff Inhalation BID    ascorbic acid  500 mg Oral Daily    vitamin B-6  50 mg Oral Daily    thiamine mononitrate  100 mg Oral Daily    baricitinib  2 mg Oral Daily    dexamethasone  6 mg IntraVENous Daily    enoxaparin  60 mg SubCUTAneous BID    insulin lispro  0-12 Units SubCUTAneous TID WC    insulin lispro  0-6 Units SubCUTAneous Nightly    aspirin EC  81 mg Oral Daily    atorvastatin  80 mg Oral Daily    Vitamin D  2,000 Units Oral Daily    escitalopram  10 mg Oral Daily    famotidine  20 mg Oral BID    losartan  50 mg Oral Daily    therapeutic multivitamin-minerals  1 tablet Oral Daily    pantoprazole  40 mg Oral Daily    empagliflozin  10 mg Oral Daily    insulin lispro  6 Units SubCUTAneous BID WC    metoprolol tartrate  50 mg Oral Daily    cefTRIAXone (ROCEPHIN) IV  1,000 mg IntraVENous Q24H    doxycycline hyclate  100 mg Oral 2 times per day    insulin glargine  35 Units SubCUTAneous Nightly     Continuous Infusions:  PRN Meds:ALPRAZolam, acetaminophen **OR** acetaminophen    PHYSICAL EXAM     /61   Pulse 67   Temp 97.7 °F (36.5 °C)   Resp 16   Ht 5' 2\" (1.575 m)   Wt 200 lb (90.7 kg)   SpO2 92%   BMI 36.58 kg/m²   Temp  Av.1 °F (36.7 °C)  Min: 97.7 °F (36.5 °C)  Max: 98.3 °F (36.8 °C)  Constitutional:  The patient is awake, alert, and oriented. INC BMI  Skin:    Warm and dry. No rashes were noted. HEENT:   Round and reactive pupils. AT/NC  Neck:    Supple to movements. Chest:   No use of accessory muscles to breathe. Symmetrical expansion. DEC  Cardiovascular:  S1 and S2 are rhythmic and regular. No murmurs appreciated. Abdomen:   Positive bowel sounds to auscultation. Benign to palpation. Extremities:   No clubbing, no cyanosis, no edema.   CNS    AAxO   Lines: pIV      DIAGNOSTIC RESULTS   Radiology:    Recent Labs     21  0536   WBC 10.7   RBC 3.97   HGB 11.5   HCT 37.4   MCV 94.2   MCH 29.0   MCHC 30.7*   RDW 15.8*      MPV 10.0     Recent Labs     21  2118 21  0536   NA  --  141   K  --  4.5   CL  --  112*   CO2  --  21*   BUN  --  36*   CREATININE  --  1.1*   GLUCOSE 303 188*   PROT  --  6.0*   LABALBU  --  2.9*   CALCIUM  --  8.0*   BILITOT  --  0.3   ALKPHOS  --  75   AST  --  54*   ALT  --  95*     Lab Results   Component Value Date    CRP 1.1 (H) 2021    CRP 1.4 (H) 2021    CRP 5.1 (H) 2021     Lab Results   Component Value Date    SEDRATE 53 (H) 2021    SEDRATE 19 10/07/2019    SEDRATE 20 2015     Recent Labs     21  0523 21  0536 21  0109 21  0600   CRP 5.1* 1.4*  --  1.1*   PROCAL 0.17*  --  0.08  --    FERRITIN  --  55  --   --    LDH  --  193  --  362*   DDIMER 216 459  --  <200   FIBRINOGEN  --  >700*  --  >700*   INR  --  1.1  --  1.1   PROTIME  --  12.9*  --  13.2*   AST  --  54*  --   --    ALT  --  95*  --   --      Lab Results   Component Value Date    CHOL 156 2020    TRIG 140 2020    HDL 60 2020    LDLCALC 68 2020    LABVLDL 28 2020     Lab Results   Component Value Date/Time    VITD25 37 10/07/2019 10:04 AM           FINAL IMPRESSION    Pt had   Chief Complaint   Patient presents with    Shortness of Breath     Pt presents today for sob following a postive covid test on  8/26/21. pt was 83% on RA. Pt is 90% 6L NC.     Positive For Covid-19    Admitted for Acute respiratory failure with hypoxia (HCC) [J96.01]  Pneumonia due to 2019-nCoV [U07.1, J12.82]  COVID-19 [U07.1]  Acute respiratory failure due to COVID-19 (HCC) [U07.1, J96.00]  On treatment for   2L UNVACCINATED     ascorbic acid (VITAMIN C) tablet 500 mg, Daily  vitamin B-6 (PYRIDOXINE) tablet 50 mg, Daily  thiamine mononitrate tablet 100 mg, Daily  baricitinib (OLUMIANT) tablet 2 mg, Daily  dexamethasone (DECADRON) injection 6 mg, Daily  enoxaparin (LOVENOX) injection 60 mg, BID  cefTRIAXone (ROCEPHIN) 1,000 mg in sterile water 10 mL IV syringe, Q24H  doxycycline hyclate (VIBRAMYCIN) capsule 100 mg, 2 times per day     ENCOURAGED PRONING/IS      · Monitor labs    Imaging and labs were reviewed per medical records. The patient was educated about the diagnosis, prognosis, indications, risks and benefits of treatment. An opportunity to ask questions was given to the patient/FAMILY. Thank you for involving me in the care of Abimbola Standing will continue to follow. Please do not hesitate to call for any questions or concerns.     Electronically signed by Johanne Blanca MD on 9/14/2021 at 4:06 PM

## 2021-09-14 NOTE — PLAN OF CARE
Problem: Airway Clearance - Ineffective  Goal: Achieve or maintain patent airway  Outcome: Met This Shift     Problem: Gas Exchange - Impaired  Goal: Absence of hypoxia  Outcome: Met This Shift  Goal: Promote optimal lung function  Outcome: Met This Shift     Problem: Breathing Pattern - Ineffective  Goal: Ability to achieve and maintain a regular respiratory rate  Outcome: Met This Shift     Problem:  Body Temperature -  Risk of, Imbalanced  Goal: Ability to maintain a body temperature within defined limits  Outcome: Met This Shift  Goal: Will regain or maintain usual level of consciousness  Outcome: Met This Shift  Goal: Complications related to the disease process, condition or treatment will be avoided or minimized  Outcome: Met This Shift     Problem: Isolation Precautions - Risk of Spread of Infection  Goal: Prevent transmission of infection  Outcome: Met This Shift     Problem: Nutrition Deficits  Goal: Optimize nutritional status  Outcome: Met This Shift     Problem: Risk for Fluid Volume Deficit  Goal: Maintain normal heart rhythm  Outcome: Met This Shift  Goal: Maintain absence of muscle cramping  Outcome: Met This Shift  Goal: Maintain normal serum potassium, sodium, calcium, phosphorus, and pH  Outcome: Met This Shift     Problem: Loneliness or Risk for Loneliness  Goal: Demonstrate positive use of time alone when socialization is not possible  Outcome: Met This Shift     Problem: Fatigue  Goal: Verbalize increase energy and improved vitality  Outcome: Met This Shift     Problem: Patient Education: Go to Patient Education Activity  Goal: Patient/Family Education  Outcome: Met This Shift     Problem: Pain:  Goal: Pain level will decrease  Description: Pain level will decrease  Outcome: Met This Shift  Goal: Control of acute pain  Description: Control of acute pain  Outcome: Met This Shift  Goal: Control of chronic pain  Description: Control of chronic pain  Outcome: Met This Shift

## 2021-09-14 NOTE — PROGRESS NOTES
Pulse ox was 87% on room air at rest.  Oxygen applied. Recovery pulse ox was 92% on 2L of oxygen while ambulating.

## 2021-09-14 NOTE — PROGRESS NOTES
Educated pt on incentive spirometer use about ten times an hour while awake. Pt receptive to learning and return demonstrated education.

## 2021-09-14 NOTE — CARE COORDINATION
SOCIAL WORK / DISCHARGE PLANNING:  COVID positive 9/11 but was positive prior to admission. Sw attempted to call room with no answer. Per ED Sw pt resides in senior, elevator accessible apartments, independent. Currently remains on 2L O2, qualifying testing in Epic, would need script for O2 arrangements to be made prior to dc. Sw will obtain dme provider choice with pt.                    Electronically signed by CAITLYN Boyce on 9/14/2021 at 4:06 PM

## 2021-09-15 VITALS
BODY MASS INDEX: 36.8 KG/M2 | HEIGHT: 62 IN | HEART RATE: 67 BPM | DIASTOLIC BLOOD PRESSURE: 68 MMHG | OXYGEN SATURATION: 92 % | SYSTOLIC BLOOD PRESSURE: 161 MMHG | WEIGHT: 200 LBS | TEMPERATURE: 97.8 F | RESPIRATION RATE: 20 BRPM

## 2021-09-15 LAB
METER GLUCOSE: 142 MG/DL (ref 74–99)
METER GLUCOSE: 145 MG/DL (ref 74–99)

## 2021-09-15 PROCEDURE — 82962 GLUCOSE BLOOD TEST: CPT

## 2021-09-15 PROCEDURE — 6370000000 HC RX 637 (ALT 250 FOR IP): Performed by: INTERNAL MEDICINE

## 2021-09-15 PROCEDURE — 6370000000 HC RX 637 (ALT 250 FOR IP): Performed by: SPECIALIST

## 2021-09-15 PROCEDURE — 6360000002 HC RX W HCPCS: Performed by: INTERNAL MEDICINE

## 2021-09-15 RX ORDER — NITROGLYCERIN 0.4 MG/1
0.4 TABLET SUBLINGUAL EVERY 5 MIN PRN
Qty: 25 TABLET | Refills: 1 | Status: SHIPPED | OUTPATIENT
Start: 2021-09-15

## 2021-09-15 RX ORDER — BUDESONIDE AND FORMOTEROL FUMARATE DIHYDRATE 80; 4.5 UG/1; UG/1
1 AEROSOL RESPIRATORY (INHALATION) 2 TIMES DAILY
Qty: 10.2 G | Refills: 3 | Status: SHIPPED | OUTPATIENT
Start: 2021-09-15

## 2021-09-15 RX ORDER — OMEPRAZOLE 20 MG/1
40 CAPSULE, DELAYED RELEASE ORAL DAILY
Qty: 30 CAPSULE | Refills: 11 | Status: SHIPPED | OUTPATIENT
Start: 2021-09-15

## 2021-09-15 RX ORDER — AMLODIPINE BESYLATE 2.5 MG/1
2.5 TABLET ORAL DAILY
Qty: 30 TABLET | Refills: 0 | Status: SHIPPED | OUTPATIENT
Start: 2021-09-15 | End: 2021-09-29 | Stop reason: ALTCHOICE

## 2021-09-15 RX ADMIN — PYRIDOXINE HCL TAB 50 MG 50 MG: 50 TAB at 09:28

## 2021-09-15 RX ADMIN — DEXAMETHASONE SODIUM PHOSPHATE 6 MG: 10 INJECTION INTRAMUSCULAR; INTRAVENOUS at 09:27

## 2021-09-15 RX ADMIN — INSULIN LISPRO 2 UNITS: 100 INJECTION, SOLUTION INTRAVENOUS; SUBCUTANEOUS at 09:34

## 2021-09-15 RX ADMIN — THIAMINE HCL TAB 100 MG 100 MG: 100 TAB at 09:28

## 2021-09-15 RX ADMIN — METOPROLOL TARTRATE 50 MG: 25 TABLET, FILM COATED ORAL at 09:27

## 2021-09-15 RX ADMIN — PANTOPRAZOLE SODIUM 40 MG: 40 TABLET, DELAYED RELEASE ORAL at 06:31

## 2021-09-15 RX ADMIN — ESCITALOPRAM OXALATE 10 MG: 10 TABLET ORAL at 09:28

## 2021-09-15 RX ADMIN — Medication 2000 UNITS: at 09:28

## 2021-09-15 RX ADMIN — DOXYCYCLINE HYCLATE 100 MG: 100 CAPSULE ORAL at 09:28

## 2021-09-15 RX ADMIN — ATORVASTATIN CALCIUM 80 MG: 20 TABLET, FILM COATED ORAL at 09:28

## 2021-09-15 RX ADMIN — OXYCODONE HYDROCHLORIDE AND ACETAMINOPHEN 500 MG: 500 TABLET ORAL at 09:28

## 2021-09-15 RX ADMIN — LOSARTAN POTASSIUM 50 MG: 50 TABLET, FILM COATED ORAL at 09:27

## 2021-09-15 RX ADMIN — ENOXAPARIN SODIUM 60 MG: 60 INJECTION, SOLUTION INTRAVENOUS; SUBCUTANEOUS at 09:15

## 2021-09-15 RX ADMIN — FAMOTIDINE 20 MG: 20 TABLET, FILM COATED ORAL at 09:28

## 2021-09-15 RX ADMIN — INSULIN LISPRO 6 UNITS: 100 INJECTION, SOLUTION INTRAVENOUS; SUBCUTANEOUS at 10:17

## 2021-09-15 RX ADMIN — INSULIN LISPRO 2 UNITS: 100 INJECTION, SOLUTION INTRAVENOUS; SUBCUTANEOUS at 11:49

## 2021-09-15 RX ADMIN — BARICITINIB 2 MG: 2 TABLET, FILM COATED ORAL at 09:28

## 2021-09-15 RX ADMIN — ASPIRIN 81 MG: 81 TABLET, COATED ORAL at 09:28

## 2021-09-15 RX ADMIN — Medication 1 TABLET: at 09:27

## 2021-09-15 ASSESSMENT — PAIN SCALES - GENERAL: PAINLEVEL_OUTOF10: 0

## 2021-09-15 NOTE — PROGRESS NOTES
Patient ID:  Suki Vail  [id number]  46 y.o.  1955    HPI:  Patient anxious could not sleep. Dry cough. Questions, answers, and tests reviewed. ROS:  Cardiovascular:   Denies any chest pain, irregular heartbeats, or palpitations. Respiratory:   Denies sputum production, hemoptysis, or wheezing. Gastrointestinal:   Denies nausea, vomiting, diarrhea, or constipation. Denies any abdominal pain. Extremities:   Denies any lower extremity swelling or edema. Neurology:    Denies any headache or focal neurological deficits. No weakness or paresthesia. Derm:    Denies any rashes, ulcers, or excoriations. Denies bruising. Genitourinary:    Denies any urgency, frequency, hematuria. Voiding without difficulty. Physical Exam:    Vitals:    09/14/21 1630   BP: (!) 111/55   Pulse:    Resp:    Temp: 97.7 °F (36.5 °C)   SpO2: 91%       HEENT:  PERRLA. EOMI. Sclera clear. Buccal mucosa moist.    Neck:  Supple. Trachea midline. No thyromegaly. No JVD. No bruits. Heart:  Rhythm regular, rate controlled. No murmurs. Lungs:  Symmetrical.  Bilateral crackles no wheezes. No rhonchi. No rales. Abdomen: Soft. Non-tender. Non-distended. Bowel sounds positive. No organomegaly or masses. No pain on palpation    Extremities:  Peripheral pulses present. No peripheral edema. No ulcers. Neurologic:  Alert x 3. No focal deficit. Cranial nerves grossly intact. Skin:  No petechia. No hemorrhage. No wounds.     Labs:  CBC:   Lab Results   Component Value Date    WBC 10.7 09/13/2021    RBC 3.97 09/13/2021    RBC 4.18 05/16/2012    HGB 11.5 09/13/2021    HCT 37.4 09/13/2021    MCV 94.2 09/13/2021    MCH 29.0 09/13/2021    MCHC 30.7 09/13/2021    RDW 15.8 09/13/2021     09/13/2021    MPV 10.0 09/13/2021     CMP:    Lab Results   Component Value Date     09/13/2021    K 4.5 09/13/2021     09/13/2021    CO2 21 09/13/2021    BUN 36 09/13/2021    CREATININE 1.1 09/13/2021    GFRAA 60 09/13/2021    LABGLOM 49 09/13/2021    GLUCOSE 188 09/13/2021    GLUCOSE 147 05/16/2012    PROT 6.0 09/13/2021    LABALBU 2.9 09/13/2021    LABALBU 4.2 05/16/2012    CALCIUM 8.0 09/13/2021    BILITOT 0.3 09/13/2021    ALKPHOS 75 09/13/2021    AST 54 09/13/2021    ALT 95 09/13/2021     PT/INR:    Lab Results   Component Value Date    PROTIME 13.2 09/14/2021    INR 1.1 09/14/2021         CT CHEST WO CONTRAST   Final Result   Diffuse multifocal patchy and ground-glass infiltrates bilaterally concerning   for multifocal pneumonia/viral infection. XR CHEST PORTABLE   Final Result   Interstitial and hazy opacities bilaterally suggestive of interstitial   pulmonary edema or bilateral pneumonia.              Other Data:      Intake/Output Summary (Last 24 hours) at 9/14/2021 2104  Last data filed at 9/14/2021 1410  Gross per 24 hour   Intake 220 ml   Output 0 ml   Net 220 ml         Scheduled Medications:   budesonide-formoterol  1 puff Inhalation BID    ascorbic acid  500 mg Oral Daily    vitamin B-6  50 mg Oral Daily    thiamine mononitrate  100 mg Oral Daily    baricitinib  2 mg Oral Daily    dexamethasone  6 mg IntraVENous Daily    enoxaparin  60 mg SubCUTAneous BID    insulin lispro  0-12 Units SubCUTAneous TID WC    insulin lispro  0-6 Units SubCUTAneous Nightly    aspirin EC  81 mg Oral Daily    atorvastatin  80 mg Oral Daily    Vitamin D  2,000 Units Oral Daily    escitalopram  10 mg Oral Daily    famotidine  20 mg Oral BID    losartan  50 mg Oral Daily    therapeutic multivitamin-minerals  1 tablet Oral Daily    pantoprazole  40 mg Oral Daily    empagliflozin  10 mg Oral Daily    insulin lispro  6 Units SubCUTAneous BID WC    metoprolol tartrate  50 mg Oral Daily    cefTRIAXone (ROCEPHIN) IV  1,000 mg IntraVENous Q24H    doxycycline hyclate  100 mg Oral 2 times per day    insulin glargine  35 Units SubCUTAneous Nightly         Infusion Medications:      Assessment:   Patient Active Problem List    Diagnosis Date Noted    Acute respiratory failure due to COVID-19 ([facility] [address] 09/11/2021    COVID-19 09/11/2021    Chest heaviness 03/08/2016    Headache 03/08/2016    HNP (herniated nucleus pulposus) with myelopathy, cervical 01/14/2016    CKD stage 3 due to type 2 diabetes mellitus ([facility] [facility] 10/30/2015    Neuropathy due to type 2 diabetes mellitus ([facility] [facility] 10/30/2015    Vitamin D deficiency 10/30/2015    Neurologic gait dysfunction 10/28/2015    CAD (coronary artery disease)     HTN (hypertension)     Dyslipidemia     Type 2 diabetes mellitus with diabetic chronic kidney disease (HCC)     GERD (gastroesophageal reflux disease)     FH: CAD (coronary artery disease)     Displacement of intervertebral disc 07/09/2012    Lumbosacral radiculopathy 07/09/2012    Spondylosis of lumbosacral region 07/09/2012    Herniation of nucleus pulposus 07/09/2012         Plan: 1. Acute respiratory failure with hypoxia trying to get patient oxygen titrated down. Continue IV Decadron. 2.  Anxiety disorder continue Xanax. 3.  Slight elevated troponin due to cardiac strain no evidence of ischemia. 4.  Diabetes mellitus continue insulin coverage. 5.  Patient postmenopausal bleeding endometrial thickening awaiting further work-up later date after over present Covid infection. Continue current therapy. See orders for further plan of care. Completed By:  Dr. iVcente Owens MD, [address]           Electronically signed by Charo Eisenberg MD

## 2021-09-15 NOTE — CARE COORDINATION
SOCIAL WORK / DISCHARGE PLANNING:  COVID positive 9/11, as well as prior to admission. Sw spoke with pt via phone regarding dc planning. Plan dc home today, pt reports ambulating ad otilia in room doing well. She would rather not dc with home O2 but is aware she qualified. Declined list but did verbally review dme providers, chose [facility]. Referral faxed with testing/ script etc to [facility]. Pt will ask her DIL to provide home going transport after 430pm when she gets off work locally. No other dc needs identified or requested.                Electronically signed by CAITLYN Gregorio on 9/15/2021 at 11:04 AM

## 2021-09-15 NOTE — PROGRESS NOTES
Pulse ox was 93% on room air at rest.  Ambulated patient on room air. Oxygen saturation was 87% on room air while ambulating. Oxygen applied  Recovery pulse ox was 92% on 2 liters of oxygen while ambulating. AMBER La

## 2021-09-15 NOTE — PROGRESS NOTES
Discharge education completed. Discharge medication given to patient. IV removed. Heart monitor removed, cleaned and returned to nurse's station.

## 2021-09-15 NOTE — PROGRESS NOTES
[facility]  [facility]  Progress Note    NAME: Margie Moreno  MR:  [id number]  :   1955  DATE OF SERVICE:09/15/21    This is a face to face encounter with Margie Moreno 77 y.o. female on 09/15/21    CHIEF COMPLAINT     ID following for   Chief Complaint   Patient presents with    Shortness of Breath     Pt presents today for sob following a postive covid test on  21. pt was 83% on RA. Pt is 90% 6L NC.     Positive For Covid-19     HISTORY OF PRESENT ILLNESS   Pt seen and examined  09/15/21  SEEN THIS MA IN BED FEELS WELL  PT FEELS READY FOR D/C     Patient is tolerating medications. No reported adverse drug reactions. REVIEW OF SYSTEMS     As stated above in the chief complaint, otherwise negative.   CURRENT MEDICATIONS      budesonide-formoterol  1 puff Inhalation BID    ascorbic acid  500 mg Oral Daily    vitamin B-6  50 mg Oral Daily    thiamine mononitrate  100 mg Oral Daily    baricitinib  2 mg Oral Daily    dexamethasone  6 mg IntraVENous Daily    enoxaparin  60 mg SubCUTAneous BID    insulin lispro  0-12 Units SubCUTAneous TID WC    insulin lispro  0-6 Units SubCUTAneous Nightly    aspirin EC  81 mg Oral Daily    atorvastatin  80 mg Oral Daily    Vitamin D  2,000 Units Oral Daily    escitalopram  10 mg Oral Daily    famotidine  20 mg Oral BID    losartan  50 mg Oral Daily    therapeutic multivitamin-minerals  1 tablet Oral Daily    pantoprazole  40 mg Oral Daily    empagliflozin  10 mg Oral Daily    insulin lispro  6 Units SubCUTAneous BID WC    metoprolol tartrate  50 mg Oral Daily    cefTRIAXone (ROCEPHIN) IV  1,000 mg IntraVENous Q24H    doxycycline hyclate  100 mg Oral 2 times per day    insulin glargine  35 Units SubCUTAneous Nightly     Continuous Infusions:  PRN Meds:ALPRAZolam, acetaminophen **OR** acetaminophen    PHYSICAL EXAM     BP (!) 161/68   Pulse 67   Temp 97.8 °F (36.6 °C) (Infrared)   Resp 20   Ht 5' 2\" (1.575 m)   Wt 200 lb (90.7 kg)   SpO2 92%   BMI 36.58 kg/m²   Temp  Av.7 °F (36.5 °C)  Min: 97.5 °F (36.4 °C)  Max: 97.8 °F (36.6 °C)  Constitutional:  The patient is awake, alert, and oriented. Skin:    Warm and dry. HEENT:    AT/NC  Chest:   No use of accessory muscles to breathe. Symmetrical expansion. DEC  Cardiovascular:  S1 and S2 are rhythmic and regular. N  Abdomen:   Positive bowel sounds to auscultation. Benign to palpation. Extremities:   No clubbing, no cyanosis, no edema. CNS    AAxO   Lines: pIV      DIAGNOSTIC RESULTS   Radiology:    Recent Labs     21  0536   WBC 10.7   RBC 3.97   HGB 11.5   HCT 37.4   MCV 94.2   MCH 29.0   MCHC 30.7*   RDW 15.8*      MPV 10.0     Recent Labs     21  0536      K 4.5   *   CO2 21*   BUN 36*   CREATININE 1.1*   GLUCOSE 188*   PROT 6.0*   LABALBU 2.9*   CALCIUM 8.0*   BILITOT 0.3   ALKPHOS 75   AST 54*   ALT 95*     Lab Results   Component Value Date    CRP 1.1 (H) 2021    CRP 1.4 (H) 2021    CRP 5.1 (H) 2021     Lab Results   Component Value Date    SEDRATE 53 (H) 2021    SEDRATE 19 10/07/2019    SEDRATE 20 2015     Recent Labs     21  0536 21  0109 21  0600   CRP 1.4*  --  1.1*   PROCAL  --  0.08  --    FERRITIN 55  --   --      --  362*   DDIMER 459  --  <200   FIBRINOGEN >700*  --  >700*   INR 1.1  --  1.1   PROTIME 12.9*  --  13.2*   AST 54*  --   --    ALT 95*  --   --      Lab Results   Component Value Date    CHOL 156 2020    TRIG 140 2020    HDL 60 2020    LDLCALC 68 2020    LABVLDL 28 2020     Lab Results   Component Value Date/Time    VITD25 37 10/07/2019 10:04 AM           FINAL IMPRESSION    Pt had   Chief Complaint   Patient presents with    Shortness of Breath     Pt presents today for sob following a postive covid test on  21. pt was 83% on RA.  Pt is 90% 6L NC.     Positive For Covid-19    Admitted for Acute respiratory failure with hypoxia ([facility] [address] [facility] [J96.01]  Pneumonia due to 2019-nCoV [U07.1, J12.82]  COVID-19 [U07.1]  Acute respiratory failure due to COVID-19 (HCC) [U07.1, J96.00]  On treatment for   ON RA  UNVACCINATED     ascorbic acid (VITAMIN C) tablet 500 mg, Daily  vitamin B-6 (PYRIDOXINE) tablet 50 mg, Daily  thiamine mononitrate tablet 100 mg, Daily  baricitinib (OLUMIANT) tablet 2 mg, Daily STP ON D/C  dexamethasone (DECADRON) injection 6 mg, Daily FOR 10 DAYS  enoxaparin (LOVENOX) injection 60 mg, BID  cefTRIAXone (ROCEPHIN) 1,000 mg in sterile water 10 mL IV syringe, Q24H CAN STOP   doxycycline hyclate (VIBRAMYCIN) capsule 100 mg, 2 times per day CAN STOP      ENCOURAGED PRONING/IS  CAN D/C  F/U PRN     · Monitor labs    Imaging and labs were reviewed per medical records. The patient was educated about the diagnosis, prognosis, indications, risks and benefits of treatment. An opportunity to ask questions was given to the patient/FAMILY. Thank you for involving me in the care of Deniz Miller will continue to follow. Please do not hesitate to call for any questions or concerns.     Electronically signed by Michael Blair MD on 9/15/2021 at 5:19 PM

## 2021-09-16 ENCOUNTER — CARE COORDINATION (OUTPATIENT)
Dept: CASE MANAGEMENT | Age: 66
End: 2021-09-16

## 2021-09-16 NOTE — CARE COORDINATION
[facility] Transitions Initial Follow Up Call    Call within 2 business days of discharge: Yes    Patient: Iain Wise Patient : 1955   MRN: [id number]  Reason for Admission: 2021 - 9/[facility]21[facility] [address] CV-19  Discharge Date: 9/15/21 RARS: Readmission Risk Score: 18  NR  CV-19    Last Discharge [facility]       Complaint Diagnosis Description Type Department Provider    21 Shortness of Breath; Positive For Covid-19 Acute respiratory failure due to COVID-19 ([facility] [address] . .. ED to Hosp-Admission (Discharged) (ADMITTED) Saeid Putnam MD; Kourtney Allison. .. Initial CT outreach attempt. Left Ravindra WAYNE w/ reminder to schedule appt. 1 Follow up with Digna Mtz MD (Internal Medicine) in 2 weeks (2021); Follow up  2 Follow up with [facility] Transitions 24 Hour Call    Care Transitions Interventions         Follow Up  No future appointments.     JAMMIE Cunningham

## 2021-09-17 ENCOUNTER — CARE COORDINATION (OUTPATIENT)
Dept: CASE MANAGEMENT | Age: 66
End: 2021-09-17

## 2021-09-17 NOTE — PROGRESS NOTES
Physician Progress Note      Caridad Lucero  CSN #:                  [id number]  :                       1955  ADMIT DATE:       2021 12:17 PM  [address] DATE:        9/15/2021 4:55 PM  RESPONDING  PROVIDER #:        Carolynn Wells MD          QUERY TEXT:    Patient admitted with COVID. Noted documentation of Acute Kidney Injury in    ID Consult. In order to support the diagnosis of DEREK, please include   additional clinical indicators in your documentation. Or please document if   the diagnosis of DEREK has been ruled out after further study    The medical record reflects the following:  Risk Factors: CKD-3, Diabetes, COVID  Clinical Indicators:  ID Note: \"derek better\";  IM note: \"CKD stage 3   due to type 2 diabetes mellitus\"; :Bun/Cr/GFR:32/1.3/41; : 36/1.1/49;     IM Note: \"Diabetes mellitus with renal insufficiency. Ivan Confer Ivan Confer \"  Treatment: Lab monitoring, I&O and assessments. Defined by Kidney Disease Improving Global Outcomes (KDIGO) clinical practice   guideline for acute kidney injury:  -Increase in SCr by greater than or equal to 0.3 mg/dl within 48 hours; or  -Increase or decrease in SCr to greater than or equal to 1.5 times baseline,   which is known or presumed to have occurred within the prior 7 days; or  -Urine volume < 0.5ml/kg/h for 6 hours    Thank you,  Prabhu Wang RN, BSN, CCDS  [phone]  Options provided:  -- Acute kidney injury ruled out after study, CKD 3 only  -- Acute kidney injury evidenced by, Please document evidence as well as   baseline creatinine, if known. -- Currently resolved acute kidney injury was evidenced by, Please document   evidence as well as baseline creatinine, if known.   -- Other - I will add my own diagnosis  -- Disagree - Not applicable / Not valid  -- Disagree - Clinically unable to determine / Unknown  -- Refer to Clinical Documentation Reviewer    PROVIDER RESPONSE TEXT:    Now resolved acute kidney injury was evidenced by IVF    Query created by:  Estelle Toro on 9/15/2021 9:53 AM      Electronically signed by:  Lindy Ram MD 9/17/2021 3:27 PM

## 2021-09-17 NOTE — DISCHARGE SUMMARY
Physician Discharge Summary     Patient ID:  Sunday Moreno  [id number]  37 y.o.  1955    Admit date: 9/11/2021    Discharge date and time: 9/15/2021  4:55 PM     Admitting Physician: Jamil Polanco MD     Discharge Physician; Artemio Aguilar MD    Admission Diagnoses: Acute respiratory failure with hypoxia ([facility] [J96.01]  Pneumonia due to 2019-nCoV [U07.1, J12.82]  COVID-19 [U07.1]  Acute respiratory failure due to COVID-19 ([facility] [address] [U07.1, J96.00]    Discharge Diagnoses: Acute respiratory failure with hypoxia secondary to COVID-19 pneumonia, ischemic cardiomyopathy, uncontrolled type 2 diabetes mellitus with long-term insulin, hyperlipidemia, COPD with exacerbation with hypoxia, postmenopausal bleeding with endometrial lining thickening awaiting final D&C for pathologic diagnosis. Admission Condition: Acutely short of breath hypoxic. Discharged Condition: Patient get hypoxic when ambulated requiring 2 L nasal cannula, had no fever chills. Indication for Admission: Acutely short of breath with hypoxia. Hospital Course: Patient with COVID-19 infection patient started becoming hypoxic, patient was sent to emergency room by family as patient with constant coughing wheezing and could not hold her breath O2 saturation at home dropping into 80s. Patient was seen by infectious disease, was placed on IV steroid, placed on aerosol treatment, incentive spirometry, blood sugar was closely monitored, received insulin for elevated blood sugar, patient was seen by infectious disease consultant. Patient started feeling better, wheezing cleared, patient room air oxygen was adequate but when ambulated patient became hypoxic requiring 2 L nasal cannula set up at home. Patient to follow-up in the office within 1 week. Consults: Infectious disease.     Significant Diagnostic Studies:   CT CHEST WO CONTRAST    Result Date: 9/11/2021  EXAMINATION: CT OF THE CHEST WITHOUT CONTRAST 9/11/2021 4:12 pm TECHNIQUE: CT of the chest was performed without the administration of intravenous contrast. Multiplanar reformatted images are provided for review. Dose modulation, iterative reconstruction, and/or weight based adjustment of the mA/kV was utilized to reduce the radiation dose to as low as reasonably achievable. COMPARISON: October 11, 2019 HISTORY: ORDERING SYSTEM PROVIDED HISTORY: Severe hypoxia, COVID-19 pneumonia TECHNOLOGIST PROVIDED HISTORY: Reason for exam:->Severe hypoxia, COVID-19 pneumonia Decision Support Exception - unselect if not a suspected or confirmed emergency medical condition->Emergency Medical Condition (MA) FINDINGS: The heart and the great vessels are normal.  There is a tiny pericardial effusion. There is coronary artery calcification. Moderate mediastinal lymph nodes are present. Trachea and major bronchi are patent. There are multifocal patchy and diffuse ground-glass opacities bilaterally. There is no pleural effusion. Liver is of normal architecture. Gastric bypass surgery is noted. Degenerative changes are identified in the lower cervical and thoracic spine. Diffuse multifocal patchy and ground-glass infiltrates bilaterally concerning for multifocal pneumonia/viral infection. XR CHEST PORTABLE    Result Date: 9/11/2021  EXAMINATION: ONE XRAY VIEW OF THE CHEST 9/11/2021 1:16 pm COMPARISON: None. HISTORY: ORDERING SYSTEM PROVIDED HISTORY: Suspected COVID-19 pneumonia, crackles bilateral bases. TECHNOLOGIST PROVIDED HISTORY: Reason for exam:->Suspected COVID-19 pneumonia, crackles bilateral bases. FINDINGS: Interstitial and hazy opacities bilaterally notable in perihilar locations in lung bases. No pleural effusion. The heart is normal size. No pneumothorax. Interstitial and hazy opacities bilaterally suggestive of interstitial pulmonary edema or bilateral pneumonia.        Treatments: IV steroid, high flow oxygen, aerosol treatment,     Discharge Exam:  Patient awake cooperative, afebrile, neck no JVD no lymphadenopathy, lungs bilateral rhonchi, normal heart sounds, abdomen soft benign, extremities no edema, neuro no focal deficit. Disposition: Home. Patient Instructions:      Medication List      START taking these medications    budesonide-formoterol 80-4.5 MCG/ACT Aero  Commonly known as: SYMBICORT  Inhale 1 puff into the lungs 2 times daily        CHANGE how you take these medications    ALPRAZolam 0.25 MG tablet  Commonly known as: Luma Nevarez  What changed: Another medication with the same name was removed. Continue taking this medication, and follow the directions you see here. Nitrostat 0.4 MG SL tablet  Generic drug: nitroGLYCERIN  Place 1 tablet under the tongue every 5 minutes as needed for Chest pain  What changed: reasons to take this     omeprazole 20 MG delayed release capsule  Commonly known as: PRILOSEC  Take 2 capsules by mouth Daily  What changed: Another medication with the same name was removed. Continue taking this medication, and follow the directions you see here.         CONTINUE taking these medications    amLODIPine 2.5 MG tablet  Commonly known as: NORVASC  Take 1 tablet by mouth daily     aspirin EC 81 MG EC tablet     atorvastatin 80 MG tablet  Commonly known as: LIPITOR     escitalopram 10 MG tablet  Commonly known as: LEXAPRO     famotidine 20 MG tablet  Commonly known as: PEPCID     insulin lispro 100 UNIT/ML injection vial  Commonly known as: HUMALOG     Jardiance 10 MG tablet  Generic drug: empagliflozin     losartan 50 MG tablet  Commonly known as: COZAAR     methylPREDNISolone 4 MG tablet  Commonly known as: MEDROL DOSEPACK     metoprolol succinate 50 MG extended release tablet  Commonly known as: TOPROL XL     MULTIVITAMIN ADULT PO     Tresiba FlexTouch 200 UNIT/ML Sopn  Generic drug: Insulin Degludec     Ventolin  (90 Base) MCG/ACT inhaler  Generic drug: albuterol sulfate HFA     Vitamin D3 50 MCG (2000 UT) Caps           Where to Get Your Medications      These medications were sent to [facility] [place], [place] - [address]  [address], [place] [address]    Phone: [phone]   · amLODIPine 2.5 MG tablet  · budesonide-formoterol 80-4.5 MCG/ACT Aero  · Nitrostat 0.4 MG SL tablet  · omeprazole 20 MG delayed release capsule         Activity: As tolerated. Follow-up with Dr. Cesario Freeman within 1 week. Completed By:  Dr. Enrike Ace MD, [address]           Electronically signed by Som Shaw MD

## 2021-09-17 NOTE — CARE COORDINATION
[facility] Transitions Initial Follow Up Call    Call within 2 business days of discharge: Yes    Patient: Arlinda Mcburney Patient : 1955   MRN: [id number]  Reason for Admission: 2021 - 9/[facility] [address] CV-19  Discharge Date: 9/15/21 RARS: Readmission Risk Score: 18  NR  CV-19    Last Discharge [facility]       Complaint Diagnosis Description Type Department Provider    21 Shortness of Breath; Positive For Covid-19 Acute respiratory failure due to COVID-19 ([facility] [address] . .. ED to Hosp-Admission (Discharged) (ADMITTED) Bartolo Amaro MD; Tena Allison. .. Second initial CT outreach attempt. Left Hippa VM w/ reminder to schedule appt. CTN s/o.     1. Follow up with Celia Price MD (Internal Medicine) in 2 weeks (2021); Follow up  2. Follow up with Jayce ROY. [facility] 24 Hour Call    Care Transitions Interventions         Follow Up  No future appointments.     Robert Doherty RN

## 2021-09-28 RX ORDER — SODIUM CHLORIDE 9 MG/ML
INJECTION, SOLUTION INTRAVENOUS CONTINUOUS
Status: DISCONTINUED | OUTPATIENT
Start: 2021-09-28 | End: 2021-09-28 | Stop reason: CLARIF

## 2021-09-29 RX ORDER — CHOLECALCIFEROL (VITAMIN D3) 125 MCG
500 CAPSULE ORAL DAILY
COMMUNITY

## 2021-09-29 RX ORDER — ZINC GLUCONATE 50 MG
50 TABLET ORAL DAILY
COMMUNITY

## 2021-09-29 RX ORDER — ASCORBIC ACID 500 MG
1000 TABLET ORAL DAILY
COMMUNITY

## 2021-09-29 NOTE — PROGRESS NOTES
[facility]                                                                                                                    PRE OP INSTRUCTIONS FOR  Tatum Ronquillo        Date: 9/29/2021    Date of surgery: 9/30/21   Arrival Time: Hospital will call you between 5pm and 7pm with your final arrival time for surgery    1. Do not eat or drink anything after midnight prior to surgery. This includes no water, chewing gum, mints or ice chips. 2. Take the following medications with a small sip of water on the morning of Surgery: Lexapro and Metoprolol     3. Diabetics may take evening dose of insulin but none after midnight. If you feel symptomatic or low blood sugar morning of surgery drink 1-2 ounces of apple juice only. 4. Aspirin, Ibuprofen, Advil, Naproxen, Vitamin E and other Anti-inflammatory products should be stopped  before surgery  as directed by your physician. Take Tylenol only unless instructed otherwise by your surgeon. 5. Check with your Doctor regarding stopping Plavix, Coumadin, Lovenox, Eliquis, Effient, or other blood thinners. 6. Do not smoke,use illicit drugs and do not drink any alcoholic beverages 24 hours prior to surgery. 7. You may brush your teeth the morning of surgery. DO NOT SWALLOW WATER    8. You MUST make arrangements for a responsible adult to take you home after your surgery. You will not be allowed to leave alone or drive yourself home. It is strongly suggested someone stay with you the first 24 hrs. Your surgery will be cancelled if you do not have a ride home. 9. PEDIATRIC PATIENTS ONLY:  A parent/legal guardian must accompany a child scheduled for surgery and plan to stay at the hospital until the child is discharged. Please do not bring other children with you.     10. Please wear simple, loose fitting clothing to the hospital.  Destiney Simental not bring valuables (money, credit cards, checkbooks, etc.) Do not wear any makeup (including no eye makeup) or nail polish on your fingers or toes. 11. DO NOT wear any jewelry or piercings on day of surgery. All body piercing jewelry must be removed. 12. Shower the night before surgery with _x__Antibacterial soap /CUCA WIPES________    13. TOTAL JOINT REPLACEMENT/HYSTERECTOMY PATIENTS ONLY---Remember to bring Blood Bank bracelet to the hospital on the day of surgery. 14. If you have a Living Will and Durable Power of  for Healthcare, please bring in a copy. 15. If appropriate bring crutches, inspirex, WALKER, CANE etc... 12. Notify your Surgeon if you develop any illness between now and surgery time, cough, cold, fever, sore throat, nausea, vomiting, etc.  Please notify your surgeon if you experience dizziness, shortness of breath or blurred vision between now & the time of your surgery. 17. If you have _x__dentures, they will be removed before going to the OR; we will provide you a container. If you wear ___contact lenses or _x__glasses, they will be removed; please bring a case for them. 18. To provide excellent care visitors will be limited to 2 in the room at any given time. 19. Please bring picture ID and insurance card. 20. Sleep apnea patients need to bring CPAP AND SETTINGS to hospital on day of surgery. 21. During flu season no children under the age of 15 are permitted in the hospital for the safety of all patients. 22. Other                 Please call AMBULATORY CARE if you have any further questions.    [address]     [address]

## 2021-09-30 ENCOUNTER — ANESTHESIA EVENT (OUTPATIENT)
Dept: OPERATING ROOM | Age: 66
End: 2021-09-30
Payer: MEDICARE

## 2021-09-30 ENCOUNTER — HOSPITAL ENCOUNTER (OUTPATIENT)
Age: 66
Setting detail: OUTPATIENT SURGERY
Discharge: HOME | End: 2021-09-30
Attending: OBSTETRICS & GYNECOLOGY | Admitting: OBSTETRICS & GYNECOLOGY
Payer: MEDICARE

## 2021-09-30 ENCOUNTER — ANESTHESIA (OUTPATIENT)
Dept: OPERATING ROOM | Age: 66
End: 2021-09-30
Payer: MEDICARE

## 2021-09-30 VITALS
TEMPERATURE: 97.6 F | OXYGEN SATURATION: 94 % | DIASTOLIC BLOOD PRESSURE: 67 MMHG | RESPIRATION RATE: 16 BRPM | BODY MASS INDEX: 36.8 KG/M2 | HEART RATE: 65 BPM | HEIGHT: 62 IN | SYSTOLIC BLOOD PRESSURE: 119 MMHG | WEIGHT: 200 LBS

## 2021-09-30 VITALS
SYSTOLIC BLOOD PRESSURE: 156 MMHG | TEMPERATURE: 61.2 F | RESPIRATION RATE: 2 BRPM | DIASTOLIC BLOOD PRESSURE: 86 MMHG | OXYGEN SATURATION: 100 %

## 2021-09-30 LAB
ANION GAP SERPL CALCULATED.3IONS-SCNC: 7 MMOL/L (ref 7–16)
BASOPHILS ABSOLUTE: 0.06 E9/L (ref 0–0.2)
BASOPHILS RELATIVE PERCENT: 0.6 % (ref 0–2)
BUN BLDV-MCNC: 20 MG/DL (ref 6–23)
CALCIUM SERPL-MCNC: 9.1 MG/DL (ref 8.6–10.2)
CHLORIDE BLD-SCNC: 107 MMOL/L (ref 98–107)
CO2: 27 MMOL/L (ref 22–29)
CREAT SERPL-MCNC: 1.3 MG/DL (ref 0.5–1)
EOSINOPHILS ABSOLUTE: 0.24 E9/L (ref 0.05–0.5)
EOSINOPHILS RELATIVE PERCENT: 2.2 % (ref 0–6)
GFR AFRICAN AMERICAN: 49
GFR NON-AFRICAN AMERICAN: 41 ML/MIN/1.73
GLUCOSE BLD-MCNC: 134 MG/DL (ref 74–99)
HCT VFR BLD CALC: 41.5 % (ref 34–48)
HEMOGLOBIN: 12.8 G/DL (ref 11.5–15.5)
IMMATURE GRANULOCYTES #: 0.03 E9/L
IMMATURE GRANULOCYTES %: 0.3 % (ref 0–5)
LYMPHOCYTES ABSOLUTE: 1.99 E9/L (ref 1.5–4)
LYMPHOCYTES RELATIVE PERCENT: 18.7 % (ref 20–42)
MCH RBC QN AUTO: 29.5 PG (ref 26–35)
MCHC RBC AUTO-ENTMCNC: 30.8 % (ref 32–34.5)
MCV RBC AUTO: 95.6 FL (ref 80–99.9)
MONOCYTES ABSOLUTE: 0.73 E9/L (ref 0.1–0.95)
MONOCYTES RELATIVE PERCENT: 6.8 % (ref 2–12)
NEUTROPHILS ABSOLUTE: 7.62 E9/L (ref 1.8–7.3)
NEUTROPHILS RELATIVE PERCENT: 71.4 % (ref 43–80)
PDW BLD-RTO: 17.4 FL (ref 11.5–15)
PLATELET # BLD: 266 E9/L (ref 130–450)
PMV BLD AUTO: 9.4 FL (ref 7–12)
POTASSIUM REFLEX MAGNESIUM: 4.9 MMOL/L (ref 3.5–5)
RBC # BLD: 4.34 E12/L (ref 3.5–5.5)
SODIUM BLD-SCNC: 141 MMOL/L (ref 132–146)
WBC # BLD: 10.7 E9/L (ref 4.5–11.5)

## 2021-09-30 PROCEDURE — 88342 IMHCHEM/IMCYTCHM 1ST ANTB: CPT

## 2021-09-30 PROCEDURE — 36415 COLL VENOUS BLD VENIPUNCTURE: CPT

## 2021-09-30 PROCEDURE — 85025 COMPLETE CBC W/AUTO DIFF WBC: CPT

## 2021-09-30 PROCEDURE — 7100000011 HC PHASE II RECOVERY - ADDTL 15 MIN: Performed by: OBSTETRICS & GYNECOLOGY

## 2021-09-30 PROCEDURE — 3600000003 HC SURGERY LEVEL 3 BASE: Performed by: OBSTETRICS & GYNECOLOGY

## 2021-09-30 PROCEDURE — 88360 TUMOR IMMUNOHISTOCHEM/MANUAL: CPT

## 2021-09-30 PROCEDURE — 2709999900 HC NON-CHARGEABLE SUPPLY: Performed by: OBSTETRICS & GYNECOLOGY

## 2021-09-30 PROCEDURE — 80048 BASIC METABOLIC PNL TOTAL CA: CPT

## 2021-09-30 PROCEDURE — 2580000003 HC RX 258: Performed by: ANESTHESIOLOGY

## 2021-09-30 PROCEDURE — 7100000001 HC PACU RECOVERY - ADDTL 15 MIN: Performed by: OBSTETRICS & GYNECOLOGY

## 2021-09-30 PROCEDURE — 88305 TISSUE EXAM BY PATHOLOGIST: CPT

## 2021-09-30 PROCEDURE — 7100000010 HC PHASE II RECOVERY - FIRST 15 MIN: Performed by: OBSTETRICS & GYNECOLOGY

## 2021-09-30 PROCEDURE — 3700000001 HC ADD 15 MINUTES (ANESTHESIA): Performed by: OBSTETRICS & GYNECOLOGY

## 2021-09-30 PROCEDURE — 6360000002 HC RX W HCPCS

## 2021-09-30 PROCEDURE — 2580000003 HC RX 258

## 2021-09-30 PROCEDURE — 3700000000 HC ANESTHESIA ATTENDED CARE: Performed by: OBSTETRICS & GYNECOLOGY

## 2021-09-30 PROCEDURE — 7100000000 HC PACU RECOVERY - FIRST 15 MIN: Performed by: OBSTETRICS & GYNECOLOGY

## 2021-09-30 PROCEDURE — 3600000013 HC SURGERY LEVEL 3 ADDTL 15MIN: Performed by: OBSTETRICS & GYNECOLOGY

## 2021-09-30 PROCEDURE — 88341 IMHCHEM/IMCYTCHM EA ADD ANTB: CPT

## 2021-09-30 RX ORDER — SODIUM CHLORIDE 9 MG/ML
25 INJECTION, SOLUTION INTRAVENOUS PRN
Status: CANCELLED | OUTPATIENT
Start: 2021-09-30

## 2021-09-30 RX ORDER — DEXAMETHASONE SODIUM PHOSPHATE 10 MG/ML
INJECTION, SOLUTION INTRAMUSCULAR; INTRAVENOUS PRN
Status: DISCONTINUED | OUTPATIENT
Start: 2021-09-30 | End: 2021-09-30 | Stop reason: SDUPTHER

## 2021-09-30 RX ORDER — PROPOFOL 10 MG/ML
INJECTION, EMULSION INTRAVENOUS PRN
Status: DISCONTINUED | OUTPATIENT
Start: 2021-09-30 | End: 2021-09-30 | Stop reason: SDUPTHER

## 2021-09-30 RX ORDER — SODIUM CHLORIDE 0.9 % (FLUSH) 0.9 %
5-40 SYRINGE (ML) INJECTION PRN
Status: CANCELLED | OUTPATIENT
Start: 2021-09-30

## 2021-09-30 RX ORDER — SODIUM CHLORIDE 0.9 % (FLUSH) 0.9 %
5-40 SYRINGE (ML) INJECTION EVERY 12 HOURS SCHEDULED
Status: DISCONTINUED | OUTPATIENT
Start: 2021-09-30 | End: 2021-09-30 | Stop reason: HOSPADM

## 2021-09-30 RX ORDER — SODIUM CHLORIDE 9 MG/ML
25 INJECTION, SOLUTION INTRAVENOUS PRN
Status: DISCONTINUED | OUTPATIENT
Start: 2021-09-30 | End: 2021-09-30 | Stop reason: HOSPADM

## 2021-09-30 RX ORDER — SODIUM CHLORIDE, SODIUM LACTATE, POTASSIUM CHLORIDE, CALCIUM CHLORIDE 600; 310; 30; 20 MG/100ML; MG/100ML; MG/100ML; MG/100ML
INJECTION, SOLUTION INTRAVENOUS CONTINUOUS
Status: DISCONTINUED | OUTPATIENT
Start: 2021-09-30 | End: 2021-09-30 | Stop reason: HOSPADM

## 2021-09-30 RX ORDER — SODIUM CHLORIDE, SODIUM LACTATE, POTASSIUM CHLORIDE, CALCIUM CHLORIDE 600; 310; 30; 20 MG/100ML; MG/100ML; MG/100ML; MG/100ML
INJECTION, SOLUTION INTRAVENOUS CONTINUOUS
Status: CANCELLED | OUTPATIENT
Start: 2021-09-30

## 2021-09-30 RX ORDER — ONDANSETRON 4 MG/1
4 TABLET, ORALLY DISINTEGRATING ORAL EVERY 8 HOURS PRN
Status: CANCELLED | OUTPATIENT
Start: 2021-09-30

## 2021-09-30 RX ORDER — MIDAZOLAM HYDROCHLORIDE 1 MG/ML
INJECTION INTRAMUSCULAR; INTRAVENOUS PRN
Status: DISCONTINUED | OUTPATIENT
Start: 2021-09-30 | End: 2021-09-30 | Stop reason: SDUPTHER

## 2021-09-30 RX ORDER — SODIUM CHLORIDE 9 MG/ML
INJECTION, SOLUTION INTRAVENOUS CONTINUOUS PRN
Status: DISCONTINUED | OUTPATIENT
Start: 2021-09-30 | End: 2021-09-30 | Stop reason: SDUPTHER

## 2021-09-30 RX ORDER — ONDANSETRON 2 MG/ML
4 INJECTION INTRAMUSCULAR; INTRAVENOUS EVERY 6 HOURS PRN
Status: CANCELLED | OUTPATIENT
Start: 2021-09-30

## 2021-09-30 RX ORDER — SODIUM CHLORIDE 0.9 % (FLUSH) 0.9 %
5-40 SYRINGE (ML) INJECTION EVERY 12 HOURS SCHEDULED
Status: CANCELLED | OUTPATIENT
Start: 2021-09-30

## 2021-09-30 RX ORDER — SODIUM CHLORIDE 0.9 % (FLUSH) 0.9 %
5-40 SYRINGE (ML) INJECTION PRN
Status: DISCONTINUED | OUTPATIENT
Start: 2021-09-30 | End: 2021-09-30 | Stop reason: HOSPADM

## 2021-09-30 RX ORDER — DOXYCYCLINE 100 MG/1
100 CAPSULE ORAL 2 TIMES DAILY
Qty: 20 CAPSULE | Refills: 0 | Status: SHIPPED | OUTPATIENT
Start: 2021-09-30 | End: 2021-10-10

## 2021-09-30 RX ORDER — SODIUM CHLORIDE 9 MG/ML
INJECTION, SOLUTION INTRAVENOUS CONTINUOUS
Status: DISCONTINUED | OUTPATIENT
Start: 2021-09-30 | End: 2021-09-30 | Stop reason: HOSPADM

## 2021-09-30 RX ORDER — MEPERIDINE HYDROCHLORIDE 25 MG/ML
12.5 INJECTION INTRAMUSCULAR; INTRAVENOUS; SUBCUTANEOUS EVERY 5 MIN PRN
Status: DISCONTINUED | OUTPATIENT
Start: 2021-09-30 | End: 2021-09-30 | Stop reason: HOSPADM

## 2021-09-30 RX ORDER — FENTANYL CITRATE 50 UG/ML
INJECTION, SOLUTION INTRAMUSCULAR; INTRAVENOUS PRN
Status: DISCONTINUED | OUTPATIENT
Start: 2021-09-30 | End: 2021-09-30 | Stop reason: SDUPTHER

## 2021-09-30 RX ORDER — OXYCODONE HYDROCHLORIDE AND ACETAMINOPHEN 5; 325 MG/1; MG/1
2 TABLET ORAL EVERY 4 HOURS PRN
Status: CANCELLED | OUTPATIENT
Start: 2021-09-30

## 2021-09-30 RX ORDER — OXYCODONE HYDROCHLORIDE AND ACETAMINOPHEN 5; 325 MG/1; MG/1
1 TABLET ORAL EVERY 4 HOURS PRN
Status: CANCELLED | OUTPATIENT
Start: 2021-09-30

## 2021-09-30 RX ORDER — ONDANSETRON 2 MG/ML
INJECTION INTRAMUSCULAR; INTRAVENOUS PRN
Status: DISCONTINUED | OUTPATIENT
Start: 2021-09-30 | End: 2021-09-30 | Stop reason: SDUPTHER

## 2021-09-30 RX ADMIN — MIDAZOLAM 2 MG: 1 INJECTION INTRAMUSCULAR; INTRAVENOUS at 08:51

## 2021-09-30 RX ADMIN — SODIUM CHLORIDE: 9 INJECTION, SOLUTION INTRAVENOUS at 08:52

## 2021-09-30 RX ADMIN — ONDANSETRON 4 MG: 2 INJECTION INTRAMUSCULAR; INTRAVENOUS at 09:03

## 2021-09-30 RX ADMIN — DEXAMETHASONE SODIUM PHOSPHATE 10 MG: 10 INJECTION, SOLUTION INTRAMUSCULAR; INTRAVENOUS at 09:02

## 2021-09-30 RX ADMIN — SODIUM CHLORIDE: 9 INJECTION, SOLUTION INTRAVENOUS at 07:31

## 2021-09-30 RX ADMIN — PROPOFOL 200 MG: 10 INJECTION, EMULSION INTRAVENOUS at 08:58

## 2021-09-30 RX ADMIN — FENTANYL CITRATE 50 MCG: 50 INJECTION, SOLUTION INTRAMUSCULAR; INTRAVENOUS at 08:58

## 2021-09-30 ASSESSMENT — PULMONARY FUNCTION TESTS
PIF_VALUE: 18
PIF_VALUE: 5
PIF_VALUE: 4
PIF_VALUE: 5
PIF_VALUE: 18
PIF_VALUE: 18
PIF_VALUE: 0
PIF_VALUE: 12
PIF_VALUE: 0
PIF_VALUE: 0
PIF_VALUE: 21
PIF_VALUE: 4
PIF_VALUE: 4
PIF_VALUE: 18
PIF_VALUE: 3
PIF_VALUE: 18
PIF_VALUE: 2
PIF_VALUE: 3
PIF_VALUE: 18
PIF_VALUE: 19
PIF_VALUE: 0
PIF_VALUE: 4
PIF_VALUE: 4
PIF_VALUE: 1
PIF_VALUE: 18
PIF_VALUE: 20
PIF_VALUE: 4
PIF_VALUE: 18
PIF_VALUE: 18
PIF_VALUE: 4
PIF_VALUE: 3
PIF_VALUE: 2
PIF_VALUE: 0
PIF_VALUE: 4
PIF_VALUE: 4
PIF_VALUE: 21
PIF_VALUE: 6
PIF_VALUE: 7
PIF_VALUE: 38
PIF_VALUE: 0
PIF_VALUE: 10
PIF_VALUE: 4
PIF_VALUE: 3
PIF_VALUE: 18
PIF_VALUE: 22
PIF_VALUE: 4
PIF_VALUE: 18
PIF_VALUE: 4

## 2021-09-30 ASSESSMENT — PAIN - FUNCTIONAL ASSESSMENT: PAIN_FUNCTIONAL_ASSESSMENT: 0-10

## 2021-09-30 ASSESSMENT — PAIN SCALES - GENERAL
PAINLEVEL_OUTOF10: 0
PAINLEVEL_OUTOF10: 0

## 2021-09-30 NOTE — PROGRESS NOTES
9/30/21 1130 reviewed discharge instructions with pt.  Pt verbalized understanding,signed in agreement and given copy. kmo rn

## 2021-09-30 NOTE — H&P
Department of Gynecology  Attending Pre-operative History and Physical      [facility]  2021  [id number]        CHIEF COMPLAINT:   Vaginal bleeding    History obtained from patient    HISTORY OF PRESENT ILLNESS:                      77 y.o. female with   history of vaginal bleeding who presents with postmenopausal bleeding and now scheduled for hysteroscopy D&C The patient had thorough counselling about the procedure, the advantages and disadvantages complications consequences short-term-long-term.,all the questions were answered to her satisfaction. Pt desires to proceed with surgery as scheduledpatient had a Covid and she recovered out of that point in her PCP has cleared her for surgery. During the time of Covid patient was scheduled for surgery and she was canceled because covid being positive      Past Medical History:        Diagnosis Date    CAD (coronary artery disease)     Chest heaviness 3/8/2016    Chronic kidney disease     DM [facility]diabetes mellitus) ([facility] [address]     Dyslipidemia     FH: CAD (coronary artery disease)     GERD (gastroesophageal reflux disease)     Headache 3/8/2016    HTN (hypertension)     Neuropathy     Spondylosis of lumbosacral region        Past Surgical History:        Procedure Laterality Date    APPENDECTOMY      CERVICAL SPINE SURGERY       SECTION      2     CHOLECYSTECTOMY      GALLBLADDER SURGERY      GASTRIC BYPASS SURGERY      8 YRS       Past Gynecological History:    1. Last menstrual period: postmenopausal  2. Menses:   3. Pap History: normal Date: 2021                    OB History    Para Term  AB Living   2 2 2     2   SAB TAB Ectopic Molar Multiple Live Births                    # Outcome Date GA Lbr Sadiq/2nd Weight Sex Delivery Anes PTL Lv   2 Term            1 Term                **2* FTDels  Medications Prior to Admission:   No medications prior to admission.     Allergies:  Gabapentin     Social History:  Social History Socioeconomic History    Marital status:      Spouse name: Not on file    Number of children: 2    Years of education: GED    Highest education level: Not on file   Occupational History    Occupation: [employer] (unemployed)   Tobacco Use    Smoking status: Never Smoker    Smokeless tobacco: Never Used   Vaping Use    Vaping Use: Never used    Passive vaping exposure Yes   Substance and Sexual Activity    Alcohol use: No     Alcohol/week: 0.0 standard drinks    Drug use: No    Sexual activity: Never   Other Topics Concern    Not on file   Social History Narrative    Not on file     Social Determinants of Health     Financial Resource Strain:     Difficulty of Paying Living Expenses:    Food Insecurity:     Worried About Running Out of Food in the Last Year:     [address] in the Last Year:    Transportation Needs:     Lack of Transportation (Medical):      Lack of Transportation (Non-Medical):    Physical Activity:     Days of Exercise per Week:     Minutes of Exercise per Session:    Stress:     Feeling of Stress :    Social Connections:     Frequency of Communication with Friends and Family:     Frequency of Social Gatherings with Friends and Family:     Attends [employer] Services:     Active Member of Clubs or Organizations:     Attends Club or Organization Meetings:     Marital Status:    Intimate Partner Violence:     Fear of Current or Ex-Partner:     Emotionally Abused:     Physically Abused:     Sexually Abused:        Family History:       Problem Relation Age of Onset    Heart Disease Mother     High Blood Pressure Mother     Cancer Father         lung and prostate    Cancer Sister         lung    Cancer Brother         lung    Cancer Sister         lung       REVIEW OF SYSTEMS:      Constitutional:  negative  Eyes:  negative  Ears, nose, mouth, throat, and face:  negative  Respiratory:  negative  Cardiovascular:  negative  Gastrointestinal: negative  Genitourinary:  negative  Integument/breast:  negative  Hematologic/lymphatic:  negative  Allergic/Immunologic:  negative  Endocrine:  negative  Musculoskeletal:  negative  Neurological:  negative  Behavior/Psych:  negative    PHYSICAL EXAM:    Vitals:  Ht 5' 2\" (1.575 m)   Wt 200 lb (90.7 kg)   BMI 36.58 kg/m²     Eyes:  normal    Head/ENT:  normal    Neck:  normal    Heart:  normal    Breast: normal    Lungs:  normal    Abdomen:  normal    Pelvic: External Genitalia: General appearance; normal, Hair distribution; normal, Lesions absent  Vagina: General appearance normal, Estrogen effect normal, Discharge absent, Lesions absent, Pelvic support normal  Cervix: General appearance normal, Lesions absent, Discharge absent, Tenderness absent, Enlargement absent, Nodularity absent  Uterus:  6-8 weeks size adnexa bilaterally nontender no masses  [place]    Extremities:  normal    CNS: normal    DATA:  Labs:  CBC:   Lab Results   Component Value Date    WBC 10.7 09/13/2021    RBC 3.97 09/13/2021    RBC 4.18 05/16/2012    HGB 11.5 09/13/2021    HCT 37.4 09/13/2021    MCV 94.2 09/13/2021    RDW 15.8 09/13/2021     09/13/2021     CMP:    Lab Results   Component Value Date     09/13/2021    K 4.5 09/13/2021     09/13/2021    CO2 21 09/13/2021    BUN 36 09/13/2021    PROT 6.0 09/13/2021     U/A:  No components found for: Carloyn Kidney, USPGRAV, UPH, UPROTEIN, UGLUCOSE, UKETONE, UBILI, UBLOOD, UNITRITE, UUROBIL, [place], [facility], [place], [facility], [place], [facility], [place]  TSH:    Lab Results   Component Value Date    TSH 2.260 10/07/2019     RPR:  No results found for: RPR  HIV:  No results found for: HIV  HgBA1c:  No components found for: HGBA1C  Blood Type:  No results found for: ABOINT  RH:  No results found for: ANATITER, C3, C4, RF  Antibody Screen:  No components found for: ABSCINT  Gonorrhea:  No components found for: PRBCHLGC  Chlamydia:  No components found for: CCHLAM    No orders to

## 2021-09-30 NOTE — OP NOTE
Operative Note      Patient: Marc Oleary  YOB: 1955  MRN: [id number]    Date of Procedure: 9/30/2021    Pre-Op Diagnosis: POST MENOPAUSAL BLEEDING    Post-Op Diagnosis: Same, endometrial CA       Procedure(s):  DILATATION AND CURETTAGE HYSTEROSCOPY    Surgeon(s):  John Vasques MD    Assistant:   First Assistant: Yaritza Logan    Anesthesia: General    Estimated Blood Loss (mL): less than 50   Fluid deficit 259 cc  Complications: None    Specimens:   ID Type Source Tests Collected by Time Destination   A : uterine curettage Tissue Tissue SURGICAL PATHOLOGY John Vasques MD 9/30/2021 9577        Implants:  * No implants in log *      Drains: * No LDAs found *    Findings: Uterocervical length 11 cm, multiple polyps noticed with vascular prominence possibly endometrial CA    Detailed Description of Procedure:   OPERATIVE NOTE    Marc Oleary DATE OF PROCEDURE:     9/30/2021  [id number]     PROCEDURE NOTE: With the patient in the supine position, general anesthesia was administered without any complications. The patient was then placed in dorsal lithotomy position using cane stirrups. The vulva,vagina and perineum was scrubbed and draped in the usual fashion. The bladder was catheterized, and clear looking urine was recovered. Bimanual exam done and uterusanteverted and adnexa checked bilaterally. MAO retractor was placed in the vagina. The anterior lip of the cervix was grabbed by single-tooth tenaculum. Uterus is sounded to11 The cervical os was dilated to allow the position of #16 Mcnally/ Brenda Doll without any difficulty. A 5.5/ diagnostic hysteroscope was introduced into the uterine cavity, and using normal saline as distending medium, diagnostic hysteroscopy was carried out. Both tubal ostia seen multiple polyps seen. The hysteroscope was then withdrawn. Polyp forceps were introduced, and the polyp was removed.  Next, sharp curettage of the endometrium was performed, and all tissues were submitted to

## 2021-09-30 NOTE — ANESTHESIA PRE PROCEDURE
Department of Anesthesiology  Preprocedure Note       Name:  Camille Roca   Age:  77 y.o.  :  1955                                          MRN:  [id number]         Date:  2021      Surgeon: Marisa Hall):  Scarlette Pallas, MD    Procedure: Procedure(s):  DILATATION AND CURETTAGE HYSTEROSCOPY    Medications prior to admission:   Prior to Admission medications    Medication Sig Start Date End Date Taking?  Authorizing Provider   vitamin B-12 (CYANOCOBALAMIN) 500 MCG tablet Take 500 mcg by mouth daily   Yes Historical Provider, MD   ascorbic acid (VITAMIN C) 500 MG tablet Take 1,000 mg by mouth daily   Yes Historical Provider, MD   zinc gluconate 50 MG tablet Take 50 mg by mouth daily   Yes Historical Provider, MD   Oxygen Concentrator 2 L/min as needed   Yes Historical Provider, MD   NITROSTAT 0.4 MG SL tablet Place 1 tablet under the tongue every 5 minutes as needed for Chest pain 9/15/21  Yes Yosi Marques MD   omeprazole (PRILOSEC) 20 MG delayed release capsule Take 2 capsules by mouth Daily  Patient taking differently: Take 20 mg by mouth Daily  9/15/21  Yes Yosi Marques MD   budesonide-formoterol (SYMBICORT) 80-4.5 MCG/ACT AERO Inhale 1 puff into the lungs 2 times daily 9/15/21  Yes Yosi Marques MD   atorvastatin (LIPITOR) 80 MG tablet Take 80 mg by mouth daily   Yes Historical Provider, MD   Insulin Degludec (TRESIBA FLEXTOUCH) 200 UNIT/ML SOPN Inject 44 Units into the skin nightly   Yes Historical Provider, MD   metoprolol succinate (TOPROL XL) 50 MG extended release tablet Take 50 mg by mouth daily   Yes Historical Provider, MD   albuterol sulfate HFA (VENTOLIN HFA) 108 (90 Base) MCG/ACT inhaler Inhale 2 puffs into the lungs every 6 hours as needed for Wheezing or Shortness of Breath   Yes Historical Provider, MD   famotidine (PEPCID) 20 MG tablet Take 20 mg by mouth daily    Yes Historical Provider, MD   escitalopram (LEXAPRO) 10 MG tablet Take 10 mg by mouth daily   Yes Historical Provider, MD losartan (COZAAR) 50 MG tablet Take 50 mg by mouth daily   Yes Historical Provider, MD   empagliflozin (JARDIANCE) 10 MG tablet Take 10 mg by mouth daily   Yes Historical Provider, MD   ALPRAZolam (XANAX) 0.25 MG tablet Take 0.25 mg by mouth nightly. Yes Historical Provider, MD   insulin lispro (HUMALOG) 100 UNIT/ML injection vial Inject 7 Units into the skin 2 times daily    Yes Historical Provider, MD   Cholecalciferol (VITAMIN D3) 2000 UNITS CAPS Take 2,000 Units by mouth daily   Yes Historical Provider, MD   Multiple Vitamins-Minerals (MULTIVITAMIN ADULT PO) Take 40 mg by mouth daily    Yes Historical Provider, MD   aspirin EC 81 MG EC tablet Take 1 tablet by mouth daily 9/16/15  Yes Davey Bravo,        Current medications:    Current Facility-Administered Medications   Medication Dose Route Frequency Provider Last Rate Last Admin    sodium chloride flush 0.9 % injection 5-40 mL  5-40 mL IntraVENous PRN Delmy Henderson MD        0.9 % sodium chloride infusion   IntraVENous Continuous Delmy Henderson MD        lactated ringers infusion   IntraVENous Continuous Tim Range, MD        sodium chloride flush 0.9 % injection 5-40 mL  5-40 mL IntraVENous 2 times per day [place] Range, MD        sodium chloride flush 0.9 % injection 5-40 mL  5-40 mL IntraVENous PRN Tim Range, MD        0.9 % sodium chloride infusion  25 mL IntraVENous PRN [place] Range, MD           Allergies:     Allergies   Allergen Reactions    Gabapentin Other (See Comments)     cassia       Problem List:    Patient Active Problem List   Diagnosis Code    CAD (coronary artery disease) I25.10    HTN (hypertension) I10    Dyslipidemia E78.5    Type 2 diabetes mellitus with diabetic chronic kidney disease ([facility] [address] E11.22    GERD (gastroesophageal reflux disease) K21.9    FH: CAD (coronary artery disease) Z82.49    Displacement of intervertebral disc [id number]    Lumbosacral radiculopathy M54.17    Spondylosis of lumbosacral region M47.817    Neurologic gait dysfunction R26.9    CKD stage 3 due to type 2 diabetes mellitus (HCC) E11.22, N18.30    Neuropathy due to type 2 diabetes mellitus (HCC) E11.40    Vitamin D deficiency E55.9    HNP (herniated nucleus pulposus) with myelopathy, cervical M50.00    Herniation of nucleus pulposus [id number]    Chest heaviness R07.89    Headache R51.9    Acute respiratory failure due to COVID-19 (HCC) U07.1, J96.00    COVID-19 U07.1       Past Medical History:        Diagnosis Date    CAD (coronary artery disease)     Chest heaviness 3/8/2016    Chronic kidney disease     DM (diabetes mellitus) ([facility] [address]     Dyslipidemia     FH: CAD (coronary artery disease)     GERD (gastroesophageal reflux disease)     Headache 3/8/2016    HTN (hypertension)     Neuropathy     Spondylosis of lumbosacral region        Past Surgical History:        Procedure Laterality Date    APPENDECTOMY      CERVICAL SPINE SURGERY       SECTION      2     CHOLECYSTECTOMY      GALLBLADDER SURGERY      GASTRIC BYPASS SURGERY      8 YRS       Social History:    Social History     Tobacco Use    Smoking status: Never Smoker    Smokeless tobacco: Never Used   Substance Use Topics    Alcohol use: No     Alcohol/week: 0.0 standard drinks                                Counseling given: Not Answered      Vital Signs (Current):   Vitals:    21 1005 21 0705   BP:  136/65   Pulse:  54   Resp:  16   Temp:  97.5 °F (36.4 °C)   TempSrc:  Infrared   SpO2:  100%   Weight: 200 lb (90.7 kg)    Height: 5' 2\" (1.575 m)                                               BP Readings from Last 3 Encounters:   21 136/65   09/15/21 (!) 161/68   16 162/84       NPO Status: Time of last liquid consumption: 2200                        Time of last solid consumption: 1800                        Date of last liquid consumption: 21                        Date of last solid food consumption: 09/29/21    BMI:   Wt Readings from Last 3 Encounters:   09/29/21 200 lb (90.7 kg)   09/11/21 200 lb (90.7 kg)   09/01/16 197 lb (89.4 kg)     Body mass index is 36.58 kg/m². CBC:   Lab Results   Component Value Date    WBC 10.7 09/13/2021    RBC 3.97 09/13/2021    RBC 4.18 05/16/2012    HGB 11.5 09/13/2021    HCT 37.4 09/13/2021    MCV 94.2 09/13/2021    RDW 15.8 09/13/2021     09/13/2021       CMP:   Lab Results   Component Value Date     09/13/2021    K 4.5 09/13/2021     09/13/2021    CO2 21 09/13/2021    BUN 36 09/13/2021    CREATININE 1.1 09/13/2021    GFRAA 60 09/13/2021    LABGLOM 49 09/13/2021    GLUCOSE 188 09/13/2021    GLUCOSE 147 05/16/2012    PROT 6.0 09/13/2021    CALCIUM 8.0 09/13/2021    BILITOT 0.3 09/13/2021    ALKPHOS 75 09/13/2021    AST 54 09/13/2021    ALT 95 09/13/2021       POC Tests: No results for input(s): POCGLU, POCNA, POCK, POCCL, POCBUN, POCHEMO, POCHCT in the last 72 hours.     Coags:   Lab Results   Component Value Date    PROTIME 13.2 09/14/2021    INR 1.1 09/14/2021    APTT 27.7 09/13/2021       HCG (If Applicable): No results found for: PREGTESTUR, PREGSERUM, HCG, HCGQUANT     ABGs: No results found for: PHART, PO2ART, XAU5THV, EZL3WOA, BEART, Y9RFENHX     Type & Screen (If Applicable):  No results found for: LABABO, LABRH    Drug/Infectious Status (If Applicable):  No results found for: HIV, HEPCAB    COVID-19 Screening (If Applicable):   Lab Results   Component Value Date    COVID19 DETECTED 09/11/2021           Anesthesia Evaluation  Patient summary reviewed  Airway: Mallampati: I  TM distance: >3 FB   Neck ROM: full  Mouth opening: > = 3 FB Dental:          Pulmonary: breath sounds clear to auscultation                             Cardiovascular:    (+) hypertension:, past MI (Chest Heaviness.):, CAD:, hyperlipidemia      ECG reviewed  Rhythm: regular  Rate: normal  Echocardiogram reviewed         Beta Blocker:  Not on Beta Blocker      ROS comment: EKG: Normal Sinus Rhythm 78. ECHO:  Mild Left Vent Hypertrophy. EF 60%. CATH:  No report. Neuro/Psych:   (+) neuromuscular disease:, headaches:,              ROS comment: Lumbar spondylosis. GI/Hepatic/Renal:   (+) GERD:, renal disease:,           Endo/Other:    (+) DiabetesType II DM, using insulin, . Abdominal:             Vascular: Other Findings:             Anesthesia Plan      general     ASA 3       Induction: intravenous. BIS  MIPS: Postoperative opioids intended. Anesthetic plan and risks discussed with patient. Plan discussed with CRNA.     Attending anesthesiologist reviewed and agrees with Juan Rodas MD   9/30/2021

## 2021-09-30 NOTE — ANESTHESIA POSTPROCEDURE EVALUATION
Department of Anesthesiology  Postprocedure Note    Patient: Radha Bermudez  MRN: [id number]  YOB: 1955  Date of evaluation: 9/30/2021  Time:  10:57 AM     Procedure Summary     Date: 09/30/21 Room / Location: [address] [place] [address] 03 [address] [facility] [place]    Anesthesia Start: 5652 Anesthesia Stop: 4172    Procedure: DILATATION AND CURETTAGE HYSTEROSCOPY (N/A ) Diagnosis: (POST MENOPAUSAL BLEEDING)    Surgeons: Tim Cai MD Responsible Provider: Rickey Forbes MD    Anesthesia Type: general ASA Status: 3          Anesthesia Type: general    Louie Phase I: Louie Score: 9    Louie Phase II:      Last vitals: Reviewed and per EMR flowsheets.        Anesthesia Post Evaluation    Patient location during evaluation: PACU  Patient participation: complete - patient participated  Level of consciousness: awake  Pain score: 0  Airway patency: patent  Nausea & Vomiting: no nausea  Complications: no  Cardiovascular status: blood pressure returned to baseline  Respiratory status: acceptable  Hydration status: euvolemic

## 2021-10-16 PROBLEM — Z98.890 POSTOPERATIVE STATE: Status: ACTIVE | Noted: 2021-10-16

## 2023-02-21 ENCOUNTER — HOSPITAL ENCOUNTER (INPATIENT)
Age: 68
LOS: 1 days | Discharge: HOME | DRG: 287 | End: 2023-02-23
Attending: EMERGENCY MEDICINE | Admitting: INTERNAL MEDICINE
Payer: MEDICARE

## 2023-02-21 ENCOUNTER — APPOINTMENT (OUTPATIENT)
Dept: CT IMAGING | Age: 68
DRG: 287 | End: 2023-02-21
Payer: MEDICARE

## 2023-02-21 ENCOUNTER — APPOINTMENT (OUTPATIENT)
Dept: GENERAL RADIOLOGY | Age: 68
DRG: 287 | End: 2023-02-21
Payer: MEDICARE

## 2023-02-21 DIAGNOSIS — I47.1 SVT (SUPRAVENTRICULAR TACHYCARDIA) (HCC): ICD-10-CM

## 2023-02-21 DIAGNOSIS — R07.9 ACUTE CHEST PAIN: Primary | ICD-10-CM

## 2023-02-21 LAB
ALBUMIN SERPL-MCNC: 4.3 G/DL (ref 3.5–5.2)
ALP BLD-CCNC: 110 U/L (ref 35–104)
ALT SERPL-CCNC: 50 U/L (ref 0–32)
ANION GAP SERPL CALCULATED.3IONS-SCNC: 9 MMOL/L (ref 7–16)
ANISOCYTOSIS: ABNORMAL
AST SERPL-CCNC: 34 U/L (ref 0–31)
BASOPHILS ABSOLUTE: 0.06 E9/L (ref 0–0.2)
BASOPHILS RELATIVE PERCENT: 0.9 % (ref 0–2)
BILIRUB SERPL-MCNC: 0.4 MG/DL (ref 0–1.2)
BUN BLDV-MCNC: 19 MG/DL (ref 6–23)
CALCIUM SERPL-MCNC: 9.3 MG/DL (ref 8.6–10.2)
CHLORIDE BLD-SCNC: 106 MMOL/L (ref 98–107)
CO2: 25 MMOL/L (ref 22–29)
CREAT SERPL-MCNC: 1.2 MG/DL (ref 0.5–1)
D DIMER: 309 NG/ML DDU
EOSINOPHILS ABSOLUTE: 0.18 E9/L (ref 0.05–0.5)
EOSINOPHILS RELATIVE PERCENT: 2.8 % (ref 0–6)
GFR SERPL CREATININE-BSD FRML MDRD: 49 ML/MIN/1.73
GLUCOSE BLD-MCNC: 126 MG/DL (ref 74–99)
HBA1C MFR BLD: 8.2 % (ref 4–5.6)
HCT VFR BLD CALC: 39.4 % (ref 34–48)
HEMOGLOBIN: 11.4 G/DL (ref 11.5–15.5)
HYPOCHROMIA: ABNORMAL
IMMATURE GRANULOCYTES #: 0.02 E9/L
IMMATURE GRANULOCYTES %: 0.3 % (ref 0–5)
INFLUENZA A BY PCR: NOT DETECTED
INFLUENZA B BY PCR: NOT DETECTED
INR BLD: 1
LYMPHOCYTES ABSOLUTE: 1.82 E9/L (ref 1.5–4)
LYMPHOCYTES RELATIVE PERCENT: 28.8 % (ref 20–42)
MCH RBC QN AUTO: 23.2 PG (ref 26–35)
MCHC RBC AUTO-ENTMCNC: 28.9 % (ref 32–34.5)
MCV RBC AUTO: 80.1 FL (ref 80–99.9)
METER GLUCOSE: 180 MG/DL (ref 74–99)
METER GLUCOSE: 221 MG/DL (ref 74–99)
MONOCYTES ABSOLUTE: 0.45 E9/L (ref 0.1–0.95)
MONOCYTES RELATIVE PERCENT: 7.1 % (ref 2–12)
NEUTROPHILS ABSOLUTE: 3.79 E9/L (ref 1.8–7.3)
NEUTROPHILS RELATIVE PERCENT: 60.1 % (ref 43–80)
OVALOCYTES: ABNORMAL
PDW BLD-RTO: 20.2 FL (ref 11.5–15)
PLATELET # BLD: 261 E9/L (ref 130–450)
PMV BLD AUTO: 9.2 FL (ref 7–12)
POIKILOCYTES: ABNORMAL
POLYCHROMASIA: ABNORMAL
POTASSIUM SERPL-SCNC: 4.8 MMOL/L (ref 3.5–5)
PRO-BNP: 179 PG/ML (ref 0–125)
PROTHROMBIN TIME: 11.2 SEC (ref 9.3–12.4)
RBC # BLD: 4.92 E12/L (ref 3.5–5.5)
SARS-COV-2, NAAT: NOT DETECTED
SODIUM BLD-SCNC: 140 MMOL/L (ref 132–146)
TOTAL PROTEIN: 7.7 G/DL (ref 6.4–8.3)
TROPONIN, HIGH SENSITIVITY: 23 NG/L (ref 0–9)
TROPONIN, HIGH SENSITIVITY: 8 NG/L (ref 0–9)
WBC # BLD: 6.3 E9/L (ref 4.5–11.5)

## 2023-02-21 PROCEDURE — 83036 HEMOGLOBIN GLYCOSYLATED A1C: CPT

## 2023-02-21 PROCEDURE — 87635 SARS-COV-2 COVID-19 AMP PRB: CPT

## 2023-02-21 PROCEDURE — 71275 CT ANGIOGRAPHY CHEST: CPT

## 2023-02-21 PROCEDURE — 6370000000 HC RX 637 (ALT 250 FOR IP): Performed by: FAMILY MEDICINE

## 2023-02-21 PROCEDURE — 2580000003 HC RX 258: Performed by: FAMILY MEDICINE

## 2023-02-21 PROCEDURE — 93005 ELECTROCARDIOGRAM TRACING: CPT

## 2023-02-21 PROCEDURE — 99285 EMERGENCY DEPT VISIT HI MDM: CPT

## 2023-02-21 PROCEDURE — 85025 COMPLETE CBC W/AUTO DIFF WBC: CPT

## 2023-02-21 PROCEDURE — 71045 X-RAY EXAM CHEST 1 VIEW: CPT

## 2023-02-21 PROCEDURE — 87502 INFLUENZA DNA AMP PROBE: CPT

## 2023-02-21 PROCEDURE — 84484 ASSAY OF TROPONIN QUANT: CPT

## 2023-02-21 PROCEDURE — 96360 HYDRATION IV INFUSION INIT: CPT

## 2023-02-21 PROCEDURE — 85610 PROTHROMBIN TIME: CPT

## 2023-02-21 PROCEDURE — 83880 ASSAY OF NATRIURETIC PEPTIDE: CPT

## 2023-02-21 PROCEDURE — 94664 DEMO&/EVAL PT USE INHALER: CPT

## 2023-02-21 PROCEDURE — G0378 HOSPITAL OBSERVATION PER HR: HCPCS

## 2023-02-21 PROCEDURE — 2580000003 HC RX 258: Performed by: EMERGENCY MEDICINE

## 2023-02-21 PROCEDURE — 6360000002 HC RX W HCPCS: Performed by: FAMILY MEDICINE

## 2023-02-21 PROCEDURE — 6360000004 HC RX CONTRAST MEDICATION: Performed by: RADIOLOGY

## 2023-02-21 PROCEDURE — 85378 FIBRIN DEGRADE SEMIQUANT: CPT

## 2023-02-21 PROCEDURE — 94640 AIRWAY INHALATION TREATMENT: CPT

## 2023-02-21 PROCEDURE — 80053 COMPREHEN METABOLIC PANEL: CPT

## 2023-02-21 PROCEDURE — 82962 GLUCOSE BLOOD TEST: CPT

## 2023-02-21 PROCEDURE — S5553 INSULIN LONG ACTING 5 U: HCPCS | Performed by: FAMILY MEDICINE

## 2023-02-21 RX ORDER — ONDANSETRON 4 MG/1
4 TABLET, ORALLY DISINTEGRATING ORAL EVERY 8 HOURS PRN
Status: DISCONTINUED | OUTPATIENT
Start: 2023-02-21 | End: 2023-02-23 | Stop reason: HOSPADM

## 2023-02-21 RX ORDER — INSULIN LISPRO 100 [IU]/ML
0-4 INJECTION, SOLUTION INTRAVENOUS; SUBCUTANEOUS NIGHTLY
Status: DISCONTINUED | OUTPATIENT
Start: 2023-02-21 | End: 2023-02-23 | Stop reason: HOSPADM

## 2023-02-21 RX ORDER — ALBUTEROL SULFATE 2.5 MG/3ML
2.5 SOLUTION RESPIRATORY (INHALATION) EVERY 6 HOURS PRN
Status: DISCONTINUED | OUTPATIENT
Start: 2023-02-21 | End: 2023-02-23 | Stop reason: HOSPADM

## 2023-02-21 RX ORDER — ACETAMINOPHEN 650 MG/1
650 SUPPOSITORY RECTAL EVERY 6 HOURS PRN
Status: DISCONTINUED | OUTPATIENT
Start: 2023-02-21 | End: 2023-02-23 | Stop reason: HOSPADM

## 2023-02-21 RX ORDER — ONDANSETRON 2 MG/ML
4 INJECTION INTRAMUSCULAR; INTRAVENOUS EVERY 6 HOURS PRN
Status: DISCONTINUED | OUTPATIENT
Start: 2023-02-21 | End: 2023-02-23 | Stop reason: HOSPADM

## 2023-02-21 RX ORDER — INSULIN GLARGINE-YFGN 100 [IU]/ML
35 INJECTION, SOLUTION SUBCUTANEOUS NIGHTLY
Status: DISCONTINUED | OUTPATIENT
Start: 2023-02-21 | End: 2023-02-23 | Stop reason: HOSPADM

## 2023-02-21 RX ORDER — FAMOTIDINE 20 MG/1
20 TABLET, FILM COATED ORAL DAILY
Status: DISCONTINUED | OUTPATIENT
Start: 2023-02-21 | End: 2023-02-23 | Stop reason: HOSPADM

## 2023-02-21 RX ORDER — INSULIN LISPRO 100 [IU]/ML
0-16 INJECTION, SOLUTION INTRAVENOUS; SUBCUTANEOUS
Status: DISCONTINUED | OUTPATIENT
Start: 2023-02-21 | End: 2023-02-23 | Stop reason: HOSPADM

## 2023-02-21 RX ORDER — SODIUM CHLORIDE 9 MG/ML
INJECTION, SOLUTION INTRAVENOUS PRN
Status: DISCONTINUED | OUTPATIENT
Start: 2023-02-21 | End: 2023-02-23 | Stop reason: HOSPADM

## 2023-02-21 RX ORDER — ACETAMINOPHEN 325 MG/1
650 TABLET ORAL EVERY 6 HOURS PRN
Status: DISCONTINUED | OUTPATIENT
Start: 2023-02-21 | End: 2023-02-23 | Stop reason: HOSPADM

## 2023-02-21 RX ORDER — ESCITALOPRAM OXALATE 10 MG/1
10 TABLET ORAL DAILY
Status: DISCONTINUED | OUTPATIENT
Start: 2023-02-21 | End: 2023-02-23 | Stop reason: HOSPADM

## 2023-02-21 RX ORDER — ENOXAPARIN SODIUM 100 MG/ML
40 INJECTION SUBCUTANEOUS DAILY
Status: DISCONTINUED | OUTPATIENT
Start: 2023-02-21 | End: 2023-02-23 | Stop reason: HOSPADM

## 2023-02-21 RX ORDER — SODIUM CHLORIDE 0.9 % (FLUSH) 0.9 %
5-40 SYRINGE (ML) INJECTION PRN
Status: DISCONTINUED | OUTPATIENT
Start: 2023-02-21 | End: 2023-02-23 | Stop reason: HOSPADM

## 2023-02-21 RX ORDER — MAGNESIUM HYDROXIDE/ALUMINUM HYDROXICE/SIMETHICONE 120; 1200; 1200 MG/30ML; MG/30ML; MG/30ML
30 SUSPENSION ORAL EVERY 6 HOURS PRN
Status: DISCONTINUED | OUTPATIENT
Start: 2023-02-21 | End: 2023-02-23 | Stop reason: HOSPADM

## 2023-02-21 RX ORDER — NITROGLYCERIN 0.4 MG/1
0.4 TABLET SUBLINGUAL EVERY 5 MIN PRN
Status: DISCONTINUED | OUTPATIENT
Start: 2023-02-21 | End: 2023-02-23 | Stop reason: HOSPADM

## 2023-02-21 RX ORDER — SODIUM CHLORIDE 0.9 % (FLUSH) 0.9 %
5-40 SYRINGE (ML) INJECTION EVERY 12 HOURS SCHEDULED
Status: DISCONTINUED | OUTPATIENT
Start: 2023-02-21 | End: 2023-02-23 | Stop reason: HOSPADM

## 2023-02-21 RX ORDER — ASPIRIN 81 MG/1
81 TABLET ORAL DAILY
Status: DISCONTINUED | OUTPATIENT
Start: 2023-02-21 | End: 2023-02-23 | Stop reason: HOSPADM

## 2023-02-21 RX ORDER — LOSARTAN POTASSIUM 50 MG/1
50 TABLET ORAL DAILY
Status: DISCONTINUED | OUTPATIENT
Start: 2023-02-21 | End: 2023-02-23 | Stop reason: HOSPADM

## 2023-02-21 RX ORDER — TRAZODONE HYDROCHLORIDE 150 MG/1
150 TABLET ORAL NIGHTLY
Status: DISCONTINUED | OUTPATIENT
Start: 2023-02-21 | End: 2023-02-23 | Stop reason: HOSPADM

## 2023-02-21 RX ORDER — 0.9 % SODIUM CHLORIDE 0.9 %
500 INTRAVENOUS SOLUTION INTRAVENOUS ONCE
Status: COMPLETED | OUTPATIENT
Start: 2023-02-21 | End: 2023-02-21

## 2023-02-21 RX ORDER — ARFORMOTEROL TARTRATE 15 UG/2ML
15 SOLUTION RESPIRATORY (INHALATION) 2 TIMES DAILY
Status: DISCONTINUED | OUTPATIENT
Start: 2023-02-21 | End: 2023-02-23 | Stop reason: HOSPADM

## 2023-02-21 RX ORDER — DEXTROSE MONOHYDRATE 100 MG/ML
INJECTION, SOLUTION INTRAVENOUS CONTINUOUS PRN
Status: DISCONTINUED | OUTPATIENT
Start: 2023-02-21 | End: 2023-02-23 | Stop reason: HOSPADM

## 2023-02-21 RX ORDER — METOPROLOL SUCCINATE 50 MG/1
50 TABLET, EXTENDED RELEASE ORAL DAILY
Status: DISCONTINUED | OUTPATIENT
Start: 2023-02-21 | End: 2023-02-22

## 2023-02-21 RX ORDER — BUDESONIDE 0.25 MG/2ML
250 INHALANT ORAL 2 TIMES DAILY
Status: DISCONTINUED | OUTPATIENT
Start: 2023-02-21 | End: 2023-02-23 | Stop reason: HOSPADM

## 2023-02-21 RX ORDER — ATORVASTATIN CALCIUM 40 MG/1
80 TABLET, FILM COATED ORAL DAILY
Status: DISCONTINUED | OUTPATIENT
Start: 2023-02-22 | End: 2023-02-23 | Stop reason: HOSPADM

## 2023-02-21 RX ORDER — ATORVASTATIN CALCIUM 40 MG/1
80 TABLET, FILM COATED ORAL NIGHTLY
Status: DISCONTINUED | OUTPATIENT
Start: 2023-02-21 | End: 2023-02-21

## 2023-02-21 RX ORDER — POLYETHYLENE GLYCOL 3350 17 G/17G
17 POWDER, FOR SOLUTION ORAL DAILY PRN
Status: DISCONTINUED | OUTPATIENT
Start: 2023-02-21 | End: 2023-02-23 | Stop reason: HOSPADM

## 2023-02-21 RX ADMIN — ARFORMOTEROL TARTRATE 15 MCG: 15 SOLUTION RESPIRATORY (INHALATION) at 20:45

## 2023-02-21 RX ADMIN — TRAZODONE HYDROCHLORIDE 150 MG: 150 TABLET, FILM COATED ORAL at 22:26

## 2023-02-21 RX ADMIN — IOPAMIDOL 75 ML: 755 INJECTION, SOLUTION INTRAVENOUS at 14:09

## 2023-02-21 RX ADMIN — NITROGLYCERIN 0.4 MG: 0.4 TABLET, ORALLY DISINTEGRATING SUBLINGUAL at 23:24

## 2023-02-21 RX ADMIN — SODIUM CHLORIDE 500 ML: 9 INJECTION, SOLUTION INTRAVENOUS at 13:10

## 2023-02-21 RX ADMIN — Medication 10 ML: at 21:31

## 2023-02-21 RX ADMIN — INSULIN GLARGINE-YFGN 35 UNITS: 100 INJECTION, SOLUTION SUBCUTANEOUS at 21:27

## 2023-02-21 RX ADMIN — BUDESONIDE 250 MCG: 0.25 SUSPENSION RESPIRATORY (INHALATION) at 20:45

## 2023-02-21 ASSESSMENT — HEART SCORE: ECG: 0

## 2023-02-21 ASSESSMENT — ENCOUNTER SYMPTOMS
CHEST TIGHTNESS: 1
SHORTNESS OF BREATH: 1

## 2023-02-21 ASSESSMENT — PAIN - FUNCTIONAL ASSESSMENT: PAIN_FUNCTIONAL_ASSESSMENT: NONE - DENIES PAIN

## 2023-02-21 NOTE — H&P
Hospital Medicine History & Physical      PCP: Eric Sutton MD    Date of Admission: 2023    Date of Service: Pt seen/examined on 2023   Placed in Observation. Chief Complaint:  Chest pain       History Of Present Illness:  69 y.o. female with past medical history of CAD CKD hyperlipemia hypertension. Patient  was seen . in the ED due to chest pain. She states that it started at  2200 on    before going to bed . She states that it  started in the mid chest and radiated into neck and went under both breasts  . She states that chest pain went into right shoulder She states that her HR went to the 140s . She states that it lasted all night. She reports shortness of breath dizziness. She reports no fever chills abdominal pain nausea or vomiting  . Patient states that she took ASA and Nitro and chest pain resolved. In the ED patient was afebrile RR 18  /64 99 % RA . Lab data reveals glucose 126  sodium 140 potassium 4.8   Trop 23 BUN 19 Scr 1.2    ALT 50 AST 34  WBC 6.3 HGB 11.4  . INR 1.0  COVID FLU neg  CTA no PE  CXR  neg  EKG NSR Dimer 309 . Patient  will be admitted for further evaluation .     Past Medical History:          Diagnosis Date    CAD (coronary artery disease)     Chest heaviness 3/8/2016    Chronic kidney disease     DM (diabetes mellitus) ([facility]     Dyslipidemia     Endometrial cancer ([facility] [address]     Endometrial cancer ([facility])     FH: CAD (coronary artery disease)     GERD (gastroesophageal reflux disease)     Headache 3/8/2016    HTN (hypertension)     Neuropathy     Spondylosis of lumbosacral region        Past Surgical History:          Procedure Laterality Date    APPENDECTOMY      CERVICAL SPINE SURGERY       SECTION      2     CHOLECYSTECTOMY      DILATION AND CURETTAGE OF UTERUS N/A 2021    DILATATION AND CURETTAGE HYSTEROSCOPY performed by Neda Mcgrath MD at [facility] [address] HYSTERECTOMY W/ BILATERAL SALPINGOOPHORECTOMY  10/16/2021       Medications Prior to Admission:      Prior to Admission medications    Medication Sig Start Date End Date Taking?  Authorizing Provider   traZODone (DESYREL) 150 MG tablet  11/17/21   Historical Provider, MD   estradiol (ESTRACE VAGINAL) 0.1 MG/GM vaginal cream Place 1 g vaginally Twice a Week 3/31/22   Grace Turk APRN - CNP   phenazopyridine (PYRIDIUM) 100 MG tablet Take 1 tablet by mouth 3 times daily as needed for Pain 3/30/22   Neal Doss APRN - CNP   acetaminophen (TYLENOL) 500 MG tablet Take 1 tablet by mouth 4 times daily as needed for Pain 10/18/21   Lexis Manzo MD   vitamin B-12 (CYANOCOBALAMIN) 500 MCG tablet Take 500 mcg by mouth daily    Historical Provider, MD   ascorbic acid (VITAMIN C) 500 MG tablet Take 1,000 mg by mouth daily    Historical Provider, MD   zinc gluconate 50 MG tablet Take 50 mg by mouth daily    Historical Provider, MD   Oxygen Concentrator 2 L/min as needed    Historical Provider, MD   NITROSTAT 0.4 MG SL tablet Place 1 tablet under the tongue every 5 minutes as needed for Chest pain 9/15/21   Mayelin Marr MD   omeprazole (PRILOSEC) 20 MG delayed release capsule Take 2 capsules by mouth Daily  Patient taking differently: Take 20 mg by mouth Daily  9/15/21   Mayelin Marr MD   budesonide-formoterol (SYMBICORT) 80-4.5 MCG/ACT AERO Inhale 1 puff into the lungs 2 times daily 9/15/21   Yosi Ng MD   atorvastatin (LIPITOR) 80 MG tablet Take 80 mg by mouth daily    Historical Provider, MD   Insulin Degludec (TRESIBA FLEXTOUCH) 200 UNIT/ML SOPN Inject 44 Units into the skin nightly    Historical Provider, MD   metoprolol succinate (TOPROL XL) 50 MG extended release tablet Take 50 mg by mouth daily    Historical Provider, MD   albuterol sulfate HFA (VENTOLIN HFA) 108 (90 Base) MCG/ACT inhaler Inhale 2 puffs into the lungs every 6 hours as needed for Wheezing or Shortness of Breath    Historical Provider, MD   famotidine (PEPCID) 20 MG tablet Take 20 mg by mouth daily     Historical Provider, MD   escitalopram (LEXAPRO) 10 MG tablet Take 10 mg by mouth daily    Historical Provider, MD   losartan (COZAAR) 50 MG tablet Take 50 mg by mouth daily    Historical Provider, MD   empagliflozin (JARDIANCE) 10 MG tablet Take 10 mg by mouth daily    Historical Provider, MD   ALPRAZolam (XANAX) 0.25 MG tablet Take 0.25 mg by mouth nightly. Patient not taking: Reported on 3/30/2022    Historical Provider, MD   insulin lispro (HUMALOG) 100 UNIT/ML injection vial Inject 7 Units into the skin 2 times daily     Historical Provider, MD   Cholecalciferol (VITAMIN D3) 2000 UNITS CAPS Take 2,000 Units by mouth daily    Historical Provider, MD   Multiple Vitamins-Minerals (MULTIVITAMIN ADULT PO) Take 40 mg by mouth daily     Historical Provider, MD   aspirin EC 81 MG EC tablet Take 1 tablet by mouth daily 9/16/15   Ai Belle DO       Allergies:  Gabapentin    Social History:      The patient currently lives with family     TOBACCO:   reports that she has never smoked. She has never used smokeless tobacco.  ETOH:   reports no history of alcohol use. Family History:       Reviewed in detail and negative for DM, CAD, Cancer, CVA. Positive as follows:        Problem Relation Age of Onset    Heart Disease Mother     High Blood Pressure Mother     Cancer Father         lung and prostate    Cancer Sister         lung    Cancer Brother         lung    Cancer Sister         lung    Ovarian Cancer Neg Hx     Breast Cancer Neg Hx     Colon Cancer Neg Hx        REVIEW OF SYSTEMS:   Pertinent positives as noted in the HPI. All other systems reviewed and negative. PHYSICAL EXAM:    /64   Pulse 80   Temp 98.4 °F (36.9 °C)   Resp 14   Wt 206 lb (93.4 kg)   SpO2 99%   BMI 37.68 kg/m²     General appearance:  No apparent distress, appears stated age and cooperative.   HEENT:  Normal cephalic, atraumatic without obvious deformity. Pupils equal, round, and reactive to light. Extra ocular muscles intact. Conjunctivae/corneas clear. Neck: Supple, with full range of motion. No jugular venous distention. Trachea midline. Respiratory:  Normal respiratory effort. Clear to auscultation, bilaterally without Rales/Wheezes/Rhonchi. Cardiovascular:   chest tenderness with palpation Regular rate and rhythm with normal S1/S2 without murmurs, rubs or gallops. Abdomen: Soft, non-tender, non-distended with normal bowel sounds. Musculoskeletal:  No clubbing, cyanosis or edema bilaterally. Full range of motion without deformity. Skin: Skin color, texture, turgor normal.  No rashes or lesions. Neurologic:  Neurovascularly intact without any focal sensory/motor deficits. Cranial nerves: II-XII intact, grossly non-focal.  Psychiatric:  Alert and oriented, thought content appropriate, normal insight    CXR:  I have reviewed the CXR   EKG:  I have reviewed the EKG     Labs:     Recent Labs     02/21/23  1222   WBC 6.3   HGB 11.4*   HCT 39.4        Recent Labs     02/21/23  1222      K 4.8      CO2 25   BUN 19   CREATININE 1.2*   CALCIUM 9.3     Recent Labs     02/21/23  1222   AST 34*   ALT 50*   BILITOT 0.4   ALKPHOS 110*     Recent Labs     02/21/23  1222   INR 1.0     No results for input(s): Georgina Giles in the last 72 hours.     Urinalysis:      Lab Results   Component Value Date/Time    NITRU Negative 03/30/2022 09:47 AM    WBCUA >100 03/30/2022 09:47 AM    BACTERIA Few 03/30/2022 09:47 AM    RBCUA 3-5 03/30/2022 09:47 AM    BLOODU trace-intact 03/30/2022 09:16 AM    BLOODU Negative 12/04/2015 10:46 AM    SPECGRAV 1.020 03/30/2022 09:16 AM    SPECGRAV 1.020 12/04/2015 10:46 AM    GLUCOSEU >1,000 03/30/2022 09:47 AM    GLUCOSEU 500 03/30/2022 09:16 AM         ASSESSMENT:    Active Hospital Problems    Diagnosis Date Noted    Acute chest pain [R07.9] 02/21/2023     Priority: Medium       PLAN:    Chest pain  Patient reports chest pain relieved with ASA and Nitro . Patient had cardiac cath in 2012  Trop 23  EKG NSR CXR neg CTA neg for PE   COVID  FLU neg Admit observation telemetry cardiology 2D echo ASA Nitro BB     Hypertension : Toprol XL Losartan     IDDM : basal insulin poct glucose sliding scale    Depression Lexapro     CAD /HLD : Lipitor ASA BB      Obesity : BMI 37 , history of gastric bypass     Chronic kidney disease stage 3 : Scr 1.2 stable     Transaminitis  : mild                 DVT Prophylaxis: Lovenox   Diet: ADULT DIET; Regular; 3 carb choices (45 gm/meal);  No Caffeine  Diet NPO  Code Status: Full Code        Dispo - likely home        Aniyah Mart MD

## 2023-02-21 NOTE — ED NOTES
Patient stated they were experiencing shortness of breath at rest and on exertion, and chest pain the patient took nitro at 1130 this am before EMS arrived x2 15 minutes apart, and relieved acute chest pain and the patient states the pain is still present but not at the same level as before taking the nitro.  Patient is alert and oriented x4 able to make needs known,      Nicci Maza RN  02/21/23 5298

## 2023-02-21 NOTE — ED NOTES
Specimens collected and sent to lab, lavender, green, blue, flu, and covid swabs     Mahad Allen RN  02/21/23 5331

## 2023-02-21 NOTE — ED NOTES
Contacted lab they stated they are still processing Troponin from 1436 when they received it and stated it will take another half an hour at minimum before a result can be made     Nina Trujillo, JAMMIE  02/21/23 8119

## 2023-02-21 NOTE — ED PROVIDER NOTES
Álvaro Cervantes 67 y.o. female PHMx of CAD, HTN, HLD, Obesity, GERD presents to the ED c/o chest pain. Onset:last night. Location/Radiation:substernal with radiation to upper chest. Duration: constant then improved. Characterization: dull/achy. Aggravating Factors: moving. Relieving Factors: nitro. Severity: moderate.       Assx Sxs: chest pain, shob.     SHe Denies: fevers/chills, abd pain, diaphoresis, n/v, numbness/tingling.         Review of Systems   Constitutional:  Negative for activity change and appetite change.   Respiratory:  Positive for chest tightness and shortness of breath.    Cardiovascular:  Positive for chest pain.   Neurological:  Negative for dizziness and syncope.   Psychiatric/Behavioral:  Negative for agitation and behavioral problems.       Physical Exam  Constitutional:       General: She is not in acute distress.     Appearance: She is obese. She is not ill-appearing or toxic-appearing.   HENT:      Head: Normocephalic and atraumatic.      Right Ear: External ear normal.      Left Ear: External ear normal.      Nose: Nose normal.      Mouth/Throat:      Mouth: Mucous membranes are moist.      Pharynx: Oropharynx is clear.   Eyes:      Extraocular Movements: Extraocular movements intact.      Pupils: Pupils are equal, round, and reactive to light.   Cardiovascular:      Rate and Rhythm: Normal rate and regular rhythm.      Pulses: Normal pulses.      Heart sounds: Normal heart sounds.   Pulmonary:      Effort: Pulmonary effort is normal.      Breath sounds: Normal breath sounds.   Abdominal:      General: There is no distension.      Palpations: Abdomen is soft.      Tenderness: There is no abdominal tenderness.   Musculoskeletal:         General: Normal range of motion.      Cervical back: Normal range of motion and neck supple.   Skin:     General: Skin is warm and dry.      Capillary Refill: Capillary refill takes less than 2 seconds.   Neurological:      General: No focal deficit  present. Mental Status: She is alert and oriented to person, place, and time. Motor: No weakness. Psychiatric:         Mood and Affect: Mood normal.         Thought Content: Thought content normal.        Procedures     Medical Decision Making  Renetta Kirkland 69 y.o. female presented to the ED c/o chest pain. Patient reports the chest pain started overnight, was waxing and waning in nature, then got better later night then got worse in the morning she took nitroglycerin sublingual and the chest pain got better after that. Also took aspirin before arrival to the ER. Upon evaluation/physical examination of the Pt she was AOX4, toxic appearing, resting comfortably cooperative, normotensive, non hypoxic on room air, CNs II-XII grossly intact. Peripheral diagnosis: STEMI, acute chest pain, PE, other. Revealed negative COVID and flu, normal electrolytes and mild decrease in her kidney function and a GFR 49, mild elevation in alkaline phosphatase and mild elevation of ALT/AST, no leukocytosis, H&H within her baseline, proBNP 179, D-dimer 309. Chest x-ray showed no acute cardiopulmonary process. CTA pulmonary with contrast no evidence of pulmonary embolism or acute pulmonary abnormality. On reevaluation patient she reported that she is feeling much better chest pain-free. Patient is heart score 7. Discussed case with hospitalist and they agreed to admit the patient for further evaluation work-up of the chest pain. Patient verbally consented agrees with plan. Patient stable at time admission.       Suggested E/M Coding Level 43486  (This level has been selected based on the 2023 CPT guidelines for E/M codes in the ED.)    COPA:    This patient has moderate complexity 1 acute illness with systemic symptoms    DATA:    This patient required moderate data complexity    Tests Ordered: 3    RISK:     This patient has high risk due to decision regarding hospitalization    Amount and/or Complexity of Data Reviewed  Labs: ordered. Radiology: ordered. ECG/medicine tests: ordered. Risk  Prescription drug management. Decision regarding hospitalization. Patient's EKG was reassuring and showed no ST segment elevations or depressions or T wave abnormalities. Patient's laboratory evaluation    ED Course as of 23 [address] 2023   1239 EKG @ 1225: This EKG is signed and interpreted by Dr Rajwinder Ovalle. Rate: 83  Rhythm: Sinus  Interpretation: Sinus rhythm, normal axis, NJ is 152, QRS is 72, QTc is 430, nonspecific T changes, when compared to prior EKG from 10/17/2021 from outside facility there are new Q waves anteriorly  Comparison: changes compared to previous EKG   [ME]   1447 History: 2  EC  Patient Age: 1  *Risk factors for Atherosclerotic disease: Hypercholesterolemia; Hypertension; Obesity; Coronary Artery Disease  Risk Factors: 2  Troponin: 1  Heart Score Total: 7  []   [address] [employer] y accepted at [facility] []      ED Course User Index  [] Soledad Rivera DO  [ME] Muriel Villagomez DO         ED Course as of 23 1644   Tue 2023   1239 EKG @ 1225: This EKG is signed and interpreted by Dr Rajwinder Ovalle. Rate: 83  Rhythm: Sinus  Interpretation: Sinus rhythm, normal axis, NJ is 152, QRS is 72, QTc is 430, nonspecific T changes, when compared to prior EKG from 10/17/2021 from outside facility there are new Q waves anteriorly  Comparison: changes compared to previous EKG   [ME]   1447 History: 2  EC  Patient Age: 1  *Risk factors for Atherosclerotic disease: Hypercholesterolemia;  Hypertension; Obesity; Coronary Artery Disease  Risk Factors: 2  Troponin: 1  Heart Score Total: 7  []   [address] [employer] accepted at [facility] []      ED Course User Index  [] Soledad Rivera DO  [ME] Muriel Villagomez DO       --------------------------------------------- PAST HISTORY ---------------------------------------------  Past Medical History:  has a past medical history of CAD (coronary artery disease), Chest heaviness, Chronic kidney disease, DM (diabetes mellitus) ([facility] [address] Dyslipidemia, Endometrial cancer ([facility] Endometrial cancer ([facility] FH: CAD (coronary artery disease), GERD (gastroesophageal reflux disease), Headache, HTN (hypertension), Neuropathy, and Spondylosis of lumbosacral region. Past Surgical History:  has a past surgical history that includes Gastric bypass surgery; Cholecystectomy;  section; Gallbladder surgery; Cervical spine surgery; Appendectomy; Dilation and curettage of uterus (N/A, 2021); and Total abdominal hysterectomy w/ bilateral salpingoophorectomy (10/16/2021). Social History:  reports that she has never smoked. She has never used smokeless tobacco. She reports that she does not drink alcohol and does not use drugs. Family History: family history includes Cancer in her brother, father, sister, and sister; Heart Disease in her mother; High Blood Pressure in her mother. The patients home medications have been reviewed.     Allergies: Gabapentin    -------------------------------------------------- RESULTS -------------------------------------------------    LABS:  Results for orders placed or performed during the hospital encounter of 23   COVID-19, Rapid    Specimen: Nares   Result Value Ref Range    SARS-CoV-2, NAAT Not Detected Not Detected   Rapid influenza A/B antigens    Specimen: Nares   Result Value Ref Range    Influenza A by PCR Not Detected Not Detected    Influenza B by PCR Not Detected Not Detected   CMP   Result Value Ref Range    Sodium 140 132 - 146 mmol/L    Potassium 4.8 3.5 - 5.0 mmol/L    Chloride 106 98 - 107 mmol/L    CO2 25 22 - 29 mmol/L    Anion Gap 9 7 - 16 mmol/L    Glucose 126 (H) 74 - 99 mg/dL    BUN 19 6 - 23 mg/dL    Creatinine 1.2 (H) 0.5 - 1.0 mg/dL    Est, Glom Filt Rate 49 >=60 mL/min/1.73    Calcium 9.3 8.6 - 10.2 mg/dL    Total Protein 7.7 6.4 - 8.3 g/dL    Albumin 4.3 3.5 - 5.2 g/dL    Total Bilirubin 0.4 0.0 - 1.2 mg/dL    Alkaline Phosphatase 110 (H) 35 - 104 U/L    ALT 50 (H) 0 - 32 U/L    AST 34 (H) 0 - 31 U/L   Troponin   Result Value Ref Range    Troponin, High Sensitivity 23 (H) 0 - 9 ng/L   CBC with Auto Differential   Result Value Ref Range    WBC 6.3 4.5 - 11.5 E9/L    RBC 4.92 3.50 - 5.50 E12/L    Hemoglobin 11.4 (L) 11.5 - 15.5 g/dL    Hematocrit 39.4 34.0 - 48.0 %    MCV 80.1 80.0 - 99.9 fL    MCH 23.2 (L) 26.0 - 35.0 pg    MCHC 28.9 (L) 32.0 - 34.5 %    RDW 20.2 (H) 11.5 - 15.0 fL    Platelets 733 027 - 403 E9/L    MPV 9.2 7.0 - 12.0 fL    Neutrophils % 60.1 43.0 - 80.0 %    Immature Granulocytes % 0.3 0.0 - 5.0 %    Lymphocytes % 28.8 20.0 - 42.0 %    Monocytes % 7.1 2.0 - 12.0 %    Eosinophils % 2.8 0.0 - 6.0 %    Basophils % 0.9 0.0 - 2.0 %    Neutrophils Absolute 3.79 1.80 - 7.30 E9/L    Immature Granulocytes # 0.02 E9/L    Lymphocytes Absolute 1.82 1.50 - 4.00 E9/L    Monocytes Absolute 0.45 0.10 - 0.95 E9/L    Eosinophils Absolute 0.18 0.05 - 0.50 E9/L    Basophils Absolute 0.06 0.00 - 0.20 E9/L    Anisocytosis 2+     Polychromasia 1+     Hypochromia 2+     Poikilocytes 1+     Ovalocytes 1+    Protime-INR   Result Value Ref Range    Protime 11.2 9.3 - 12.4 sec    INR 1.0    Brain Natriuretic Peptide   Result Value Ref Range    Pro- (H) 0 - 125 pg/mL   D-Dimer, Quantitative   Result Value Ref Range    D-Dimer, Quant 309 ng/mL DDU   EKG 12 Lead   Result Value Ref Range    Ventricular Rate 83 BPM    Atrial Rate 83 BPM    P-R Interval 152 ms    QRS Duration 72 ms    Q-T Interval 366 ms    QTc Calculation (Bazett) 430 ms    P Axis 6 degrees    R Axis -8 degrees    T Axis 54 degrees       RADIOLOGY:  CTA PULMONARY W CONTRAST   Final Result   No evidence of pulmonary embolism or acute pulmonary abnormality. RECOMMENDATIONS:   Unavailable         XR CHEST PORTABLE   Final Result   No acute process.                ------------------------- NURSING NOTES AND VITALS REVIEWED ---------------------------  Date / Time Roomed:  2/21/2023 11:48 AM  ED Bed Assignment:  40/40    The nursing notes within the ED encounter and vital signs as below have been reviewed. Patient Vitals for the past 24 hrs:   BP Temp Pulse Resp SpO2 Weight   02/21/23 1309 -- -- -- -- -- 206 lb (93.4 kg)   02/21/23 1250 114/64 -- -- -- -- --   02/21/23 1233 87/77 -- 80 14 -- --   02/21/23 1158 112/64 98.4 °F (36.9 °C) (!) 110 18 99 % --       Oxygen Saturation Interpretation: Normal    ------------------------------------------ PROGRESS NOTES ------------------------------------------  Re-evaluation(s):  Time: 4527  Patients symptoms are improving  Repeat physical examination is improved    Counseling:  I have spoken with the patient and discussed todays results, in addition to providing specific details for the plan of care and counseling regarding the diagnosis and prognosis. Their questions are answered at this time and they are agreeable with the plan of admission.    --------------------------------- ADDITIONAL PROVIDER NOTES ---------------------------------  Consultations:  Time: 1550. Spoke with Dr. Oriana Adam (EVERT). Discussed case. They will admit the patient. This patient's ED course included: a personal history and physicial examination, re-evaluation prior to disposition, cardiac monitoring, and continuous pulse oximetry    This patient has remained hemodynamically stable during their ED course. Diagnosis:  1. Acute chest pain        Disposition:  Patient's disposition: Admit    Patient's condition is stable.         Vilma Dominguez DO  Resident  02/21/23 1715        ATTENDING PROVIDER ATTESTATION:     Ayan Wasserman presented to the emergency department for evaluation of Shortness of Breath (SOB starting today with exertion . )    I have reviewed and discussed the case, including pertinent history (medical, surgical, family and social) and exam findings with the Resident and the Nurse assigned to [employer]. I have personally performed and/or participated in the history, exam, medical decision making, and procedures and agree with all pertinent clinical information. I have reviewed my findings and recommendations with [employer] and members of family present at the time of disposition. I have personally reviewed all laboratory radiology results from today's visit. I have personally reviewed and agree with resident interpretation of EKG for all EKGs from today's visit. I, Dr. Damion Mcgrath am the primary provider of record       My findings/plan: The encounter diagnosis was Acute chest pain.   Current Discharge Medication List        DO Sam Ma DO  02/21/23 2005

## 2023-02-21 NOTE — ED NOTES
Contacted unit spoke with tamara Mao sent, unit stated they room assigned needed to be terminally cleaned then patient can be put in transport      [facility]  02/21/23 0429

## 2023-02-22 ENCOUNTER — APPOINTMENT (OUTPATIENT)
Dept: NUCLEAR MEDICINE | Age: 68
DRG: 287 | End: 2023-02-22
Payer: MEDICARE

## 2023-02-22 ENCOUNTER — APPOINTMENT (OUTPATIENT)
Dept: NON INVASIVE DIAGNOSTICS | Age: 68
DRG: 287 | End: 2023-02-22
Payer: MEDICARE

## 2023-02-22 PROBLEM — I47.1 ATRIAL TACHYCARDIA (HCC): Status: ACTIVE | Noted: 2023-02-22

## 2023-02-22 PROBLEM — I47.19 ATRIAL TACHYCARDIA: Status: ACTIVE | Noted: 2023-02-22

## 2023-02-22 LAB
ANION GAP SERPL CALCULATED.3IONS-SCNC: 9 MMOL/L (ref 7–16)
ANISOCYTOSIS: ABNORMAL
BASOPHILS ABSOLUTE: 0.12 E9/L (ref 0–0.2)
BASOPHILS RELATIVE PERCENT: 1.7 % (ref 0–2)
BUN BLDV-MCNC: 25 MG/DL (ref 6–23)
CALCIUM SERPL-MCNC: 9.1 MG/DL (ref 8.6–10.2)
CHLORIDE BLD-SCNC: 105 MMOL/L (ref 98–107)
CHOLESTEROL, TOTAL: 140 MG/DL (ref 0–199)
CO2: 28 MMOL/L (ref 22–29)
CREAT SERPL-MCNC: 1.3 MG/DL (ref 0.5–1)
EKG ATRIAL RATE: 63 BPM
EKG ATRIAL RATE: 83 BPM
EKG P AXIS: 19 DEGREES
EKG P AXIS: 6 DEGREES
EKG P-R INTERVAL: 152 MS
EKG P-R INTERVAL: 158 MS
EKG Q-T INTERVAL: 366 MS
EKG Q-T INTERVAL: 430 MS
EKG QRS DURATION: 72 MS
EKG QRS DURATION: 74 MS
EKG QTC CALCULATION (BAZETT): 430 MS
EKG QTC CALCULATION (BAZETT): 440 MS
EKG R AXIS: -8 DEGREES
EKG R AXIS: 9 DEGREES
EKG T AXIS: 36 DEGREES
EKG T AXIS: 54 DEGREES
EKG VENTRICULAR RATE: 63 BPM
EKG VENTRICULAR RATE: 83 BPM
EOSINOPHILS ABSOLUTE: 0.25 E9/L (ref 0.05–0.5)
EOSINOPHILS RELATIVE PERCENT: 3.5 % (ref 0–6)
GFR SERPL CREATININE-BSD FRML MDRD: 44 ML/MIN/1.73
GLUCOSE BLD-MCNC: 93 MG/DL (ref 74–99)
HCT VFR BLD CALC: 35.8 % (ref 34–48)
HDLC SERPL-MCNC: 50 MG/DL
HEMOGLOBIN: 10 G/DL (ref 11.5–15.5)
HYPOCHROMIA: ABNORMAL
LDL CHOLESTEROL CALCULATED: 67 MG/DL (ref 0–99)
LV EF: 70 %
LV EF: 76 %
LVEF MODALITY: NORMAL
LVEF MODALITY: NORMAL
LYMPHOCYTES ABSOLUTE: 2.09 E9/L (ref 1.5–4)
LYMPHOCYTES RELATIVE PERCENT: 28.7 % (ref 20–42)
MAGNESIUM: 2.5 MG/DL (ref 1.6–2.6)
MCH RBC QN AUTO: 22.6 PG (ref 26–35)
MCHC RBC AUTO-ENTMCNC: 27.9 % (ref 32–34.5)
MCV RBC AUTO: 80.8 FL (ref 80–99.9)
METER GLUCOSE: 104 MG/DL (ref 74–99)
METER GLUCOSE: 114 MG/DL (ref 74–99)
METER GLUCOSE: 298 MG/DL (ref 74–99)
METER GLUCOSE: 92 MG/DL (ref 74–99)
MONOCYTES ABSOLUTE: 0.58 E9/L (ref 0.1–0.95)
MONOCYTES RELATIVE PERCENT: 7.8 % (ref 2–12)
NEUTROPHILS ABSOLUTE: 4.18 E9/L (ref 1.8–7.3)
NEUTROPHILS RELATIVE PERCENT: 58.3 % (ref 43–80)
OVALOCYTES: ABNORMAL
PDW BLD-RTO: 20.3 FL (ref 11.5–15)
PLATELET # BLD: 256 E9/L (ref 130–450)
PMV BLD AUTO: 9.1 FL (ref 7–12)
POIKILOCYTES: ABNORMAL
POLYCHROMASIA: ABNORMAL
POTASSIUM SERPL-SCNC: 4.3 MMOL/L (ref 3.5–5)
RBC # BLD: 4.43 E12/L (ref 3.5–5.5)
SODIUM BLD-SCNC: 142 MMOL/L (ref 132–146)
TARGET CELLS: ABNORMAL
TRIGL SERPL-MCNC: 116 MG/DL (ref 0–149)
TSH SERPL DL<=0.05 MIU/L-ACNC: 1.26 UIU/ML (ref 0.27–4.2)
VLDLC SERPL CALC-MCNC: 23 MG/DL
WBC # BLD: 7.2 E9/L (ref 4.5–11.5)

## 2023-02-22 PROCEDURE — 93016 CV STRESS TEST SUPVJ ONLY: CPT | Performed by: INTERNAL MEDICINE

## 2023-02-22 PROCEDURE — G0378 HOSPITAL OBSERVATION PER HR: HCPCS

## 2023-02-22 PROCEDURE — 6370000000 HC RX 637 (ALT 250 FOR IP): Performed by: INTERNAL MEDICINE

## 2023-02-22 PROCEDURE — 2580000003 HC RX 258: Performed by: INTERNAL MEDICINE

## 2023-02-22 PROCEDURE — 93018 CV STRESS TEST I&R ONLY: CPT | Performed by: INTERNAL MEDICINE

## 2023-02-22 PROCEDURE — 93005 ELECTROCARDIOGRAM TRACING: CPT | Performed by: FAMILY MEDICINE

## 2023-02-22 PROCEDURE — 85025 COMPLETE CBC W/AUTO DIFF WBC: CPT

## 2023-02-22 PROCEDURE — 6360000004 HC RX CONTRAST MEDICATION: Performed by: FAMILY MEDICINE

## 2023-02-22 PROCEDURE — 78452 HT MUSCLE IMAGE SPECT MULT: CPT | Performed by: INTERNAL MEDICINE

## 2023-02-22 PROCEDURE — APPSS60 APP SPLIT SHARED TIME 46-60 MINUTES: Performed by: CLINICAL NURSE SPECIALIST

## 2023-02-22 PROCEDURE — 84443 ASSAY THYROID STIM HORMONE: CPT

## 2023-02-22 PROCEDURE — 6370000000 HC RX 637 (ALT 250 FOR IP): Performed by: NURSE PRACTITIONER

## 2023-02-22 PROCEDURE — 82962 GLUCOSE BLOOD TEST: CPT

## 2023-02-22 PROCEDURE — 93017 CV STRESS TEST TRACING ONLY: CPT

## 2023-02-22 PROCEDURE — 36415 COLL VENOUS BLD VENIPUNCTURE: CPT

## 2023-02-22 PROCEDURE — 94640 AIRWAY INHALATION TREATMENT: CPT

## 2023-02-22 PROCEDURE — 93010 ELECTROCARDIOGRAM REPORT: CPT | Performed by: INTERNAL MEDICINE

## 2023-02-22 PROCEDURE — 83735 ASSAY OF MAGNESIUM: CPT

## 2023-02-22 PROCEDURE — 6370000000 HC RX 637 (ALT 250 FOR IP): Performed by: FAMILY MEDICINE

## 2023-02-22 PROCEDURE — 93306 TTE W/DOPPLER COMPLETE: CPT

## 2023-02-22 PROCEDURE — 6360000002 HC RX W HCPCS: Performed by: FAMILY MEDICINE

## 2023-02-22 PROCEDURE — 93005 ELECTROCARDIOGRAM TRACING: CPT | Performed by: INTERNAL MEDICINE

## 2023-02-22 PROCEDURE — 3430000000 HC RX DIAGNOSTIC RADIOPHARMACEUTICAL: Performed by: RADIOLOGY

## 2023-02-22 PROCEDURE — 2580000003 HC RX 258: Performed by: FAMILY MEDICINE

## 2023-02-22 PROCEDURE — 80048 BASIC METABOLIC PNL TOTAL CA: CPT

## 2023-02-22 PROCEDURE — 99223 1ST HOSP IP/OBS HIGH 75: CPT | Performed by: INTERNAL MEDICINE

## 2023-02-22 PROCEDURE — 96372 THER/PROPH/DIAG INJ SC/IM: CPT

## 2023-02-22 PROCEDURE — A9500 TC99M SESTAMIBI: HCPCS | Performed by: RADIOLOGY

## 2023-02-22 PROCEDURE — 80061 LIPID PANEL: CPT

## 2023-02-22 PROCEDURE — 6360000002 HC RX W HCPCS: Performed by: CLINICAL NURSE SPECIALIST

## 2023-02-22 PROCEDURE — S5553 INSULIN LONG ACTING 5 U: HCPCS | Performed by: FAMILY MEDICINE

## 2023-02-22 PROCEDURE — 6360000004 HC RX CONTRAST MEDICATION

## 2023-02-22 PROCEDURE — 78452 HT MUSCLE IMAGE SPECT MULT: CPT

## 2023-02-22 RX ORDER — TETRAKIS(2-METHOXYISOBUTYLISOCYANIDE)COPPER(I) TETRAFLUOROBORATE 1 MG/ML
10 INJECTION, POWDER, LYOPHILIZED, FOR SOLUTION INTRAVENOUS
Status: COMPLETED | OUTPATIENT
Start: 2023-02-22 | End: 2023-02-22

## 2023-02-22 RX ORDER — METOPROLOL SUCCINATE 50 MG/1
50 TABLET, EXTENDED RELEASE ORAL 2 TIMES DAILY
Status: DISCONTINUED | OUTPATIENT
Start: 2023-02-22 | End: 2023-02-23 | Stop reason: HOSPADM

## 2023-02-22 RX ORDER — SODIUM CHLORIDE 9 MG/ML
INJECTION, SOLUTION INTRAVENOUS CONTINUOUS
Status: ACTIVE | OUTPATIENT
Start: 2023-02-23 | End: 2023-02-23

## 2023-02-22 RX ORDER — TETRAKIS(2-METHOXYISOBUTYLISOCYANIDE)COPPER(I) TETRAFLUOROBORATE 1 MG/ML
30 INJECTION, POWDER, LYOPHILIZED, FOR SOLUTION INTRAVENOUS
Status: COMPLETED | OUTPATIENT
Start: 2023-02-22 | End: 2023-02-22

## 2023-02-22 RX ADMIN — ASPIRIN 81 MG: 81 TABLET, COATED ORAL at 11:07

## 2023-02-22 RX ADMIN — ARFORMOTEROL TARTRATE 15 MCG: 15 SOLUTION RESPIRATORY (INHALATION) at 20:43

## 2023-02-22 RX ADMIN — Medication 32.5 MILLICURIE: at 13:06

## 2023-02-22 RX ADMIN — LOSARTAN POTASSIUM 50 MG: 50 TABLET, FILM COATED ORAL at 11:04

## 2023-02-22 RX ADMIN — ENOXAPARIN SODIUM 40 MG: 100 INJECTION SUBCUTANEOUS at 11:07

## 2023-02-22 RX ADMIN — FAMOTIDINE 20 MG: 20 TABLET, FILM COATED ORAL at 11:04

## 2023-02-22 RX ADMIN — ARFORMOTEROL TARTRATE 15 MCG: 15 SOLUTION RESPIRATORY (INHALATION) at 09:54

## 2023-02-22 RX ADMIN — ATORVASTATIN CALCIUM 80 MG: 40 TABLET, FILM COATED ORAL at 11:05

## 2023-02-22 RX ADMIN — Medication 10 ML: at 20:57

## 2023-02-22 RX ADMIN — Medication 10 ML: at 11:08

## 2023-02-22 RX ADMIN — BUDESONIDE 250 MCG: 0.25 SUSPENSION RESPIRATORY (INHALATION) at 09:55

## 2023-02-22 RX ADMIN — BUDESONIDE 250 MCG: 0.25 SUSPENSION RESPIRATORY (INHALATION) at 20:43

## 2023-02-22 RX ADMIN — ESCITALOPRAM OXALATE 10 MG: 10 TABLET ORAL at 11:05

## 2023-02-22 RX ADMIN — INSULIN GLARGINE-YFGN 35 UNITS: 100 INJECTION, SOLUTION SUBCUTANEOUS at 21:12

## 2023-02-22 RX ADMIN — METOPROLOL SUCCINATE 50 MG: 50 TABLET, EXTENDED RELEASE ORAL at 20:52

## 2023-02-22 RX ADMIN — PERFLUTREN 1.5 ML: 6.52 INJECTION, SUSPENSION INTRAVENOUS at 11:08

## 2023-02-22 RX ADMIN — SODIUM CHLORIDE: 9 INJECTION, SOLUTION INTRAVENOUS at 21:27

## 2023-02-22 RX ADMIN — REGADENOSON 0.4 MG: 0.08 INJECTION, SOLUTION INTRAVENOUS at 13:05

## 2023-02-22 RX ADMIN — TRAZODONE HYDROCHLORIDE 150 MG: 150 TABLET, FILM COATED ORAL at 20:52

## 2023-02-22 RX ADMIN — Medication 12.9 MILLICURIE: at 11:30

## 2023-02-22 ASSESSMENT — PAIN SCALES - GENERAL
PAINLEVEL_OUTOF10: 2
PAINLEVEL_OUTOF10: 0

## 2023-02-22 NOTE — CONSULTS
Inpatient Cardiology Consultation      Reason for Consult:  Chest pain     Consulting Physician: Dr. Keisha Lee    Requesting Physician:  Dr. Gilma Akins    Date of Consultation: 2/22/2023    History of Present Illness:   Ms. Denisse Arita is a 69year old lady who is previously unknown to our practice; her past medical history includes coronary artery disease; hypertension; and diabetes mellitus. She received prior cardiology care through [facility] in [facility] and testing from there is detailed below. She presented to the emergency room on February 21 after developing burning pain in the right side of her neck which radiated to the right side of her chest, and then went \"back and forth\" between the left and right side, under her breasts. She was also short of breath and and had palpitations with reported pulse in the 140s on her watch. She denies having performed any strenuous activities prior to onset, and the pain was not exertional. It lasted about fifteen to twenty minutes, and she took aspirin and two sublingual nitroglycerin, with improvement in the pain after about fifteen minutes. She called EMS, and on arrival to the ER was pain free. Pulmonary CTA was negative for PE, , and hs-Alexander levels 23 >>8. Last night, she had another episode of chest pain while at rest, which lasted about five to ten minutes and resolved with one sublingual nitroglycerin. She has been having episodic right neck and chest pain for several months, although they have been occurring more frequently recently. She can name no precipitating factors, and the pain occurs both at rest and with activity. She thinks she has taken two nitroglycerin within the past month. She also reports chronic exertional dyspnea, and also feels this has been some worse recently. She denies orthopnea or PND.        Past Medical History:    Coronary artery disease  Hypertension  Diabetes mellitus  Dyslipidemia   Chronic kidney disease Obesity with history of gastric bypass surgery with 100 lb weight loss   GERD  Endometrial cancer with hysterectomy in 10/2021  COVID 19 infection and acute hypoxic respiratory failure in 9/2021 (unvaccinated)  DEREK (BUN 32, Cr 1.3) in setting of COVID infection  9/2021  Cholecystectomy  Appendectomy       Prior Cardiac Testing:      TTE 12/28/2022        Cardiac catheterization 12/28/2012          TTE 7/25/2014:      Zee Ends stress 8/21/2015:     Medications Prior to Admit:  Prior to Admission medications    Medication Sig Start Date End Date Taking?  Authorizing Provider   traZODone (DESYREL) 150 MG tablet  11/17/21   Historical Provider, MD   estradiol (ESTRACE VAGINAL) 0.1 MG/GM vaginal cream Place 1 g vaginally Twice a Week  Patient not taking: Reported on 2/21/2023 3/31/22   SURESH Ribera CNP   phenazopyridine (PYRIDIUM) 100 MG tablet Take 1 tablet by mouth 3 times daily as needed for Pain  Patient not taking: Reported on 2/21/2023 3/30/22   SURESH Ribera CNP   acetaminophen (TYLENOL) 500 MG tablet Take 1 tablet by mouth 4 times daily as needed for Pain 10/18/21   Tracy Lawler MD   vitamin B-12 (CYANOCOBALAMIN) 500 MCG tablet Take 500 mcg by mouth daily    Historical Provider, MD   ascorbic acid (VITAMIN C) 500 MG tablet Take 1,000 mg by mouth daily    Historical Provider, MD   zinc gluconate 50 MG tablet Take 50 mg by mouth daily    Historical Provider, MD   Oxygen Concentrator 2 L/min as needed    Historical Provider, MD   NITROSTAT 0.4 MG SL tablet Place 1 tablet under the tongue every 5 minutes as needed for Chest pain 9/15/21   Dennis Fajardo MD   omeprazole (PRILOSEC) 20 MG delayed release capsule Take 2 capsules by mouth Daily  Patient taking differently: Take 20 mg by mouth Daily 9/15/21   Dennis Fajardo MD   budesonide-formoterol (SYMBICORT) 80-4.5 MCG/ACT AERO Inhale 1 puff into the lungs 2 times daily 9/15/21   Dennis Fajardo MD   atorvastatin (LIPITOR) 80 MG tablet Take 80 mg by mouth daily    Historical Provider, MD   Insulin Degludec (TRESIBA FLEXTOUCH) 200 UNIT/ML SOPN Inject 44 Units into the skin nightly    Historical Provider, MD   metoprolol succinate (TOPROL XL) 50 MG extended release tablet Take 50 mg by mouth daily    Historical Provider, MD   albuterol sulfate HFA (PROVENTIL;VENTOLIN;PROAIR) 108 (90 Base) MCG/ACT inhaler Inhale 2 puffs into the lungs every 6 hours as needed for Wheezing or Shortness of Breath    Historical Provider, MD   famotidine (PEPCID) 20 MG tablet Take 20 mg by mouth daily     Historical Provider, MD   escitalopram (LEXAPRO) 10 MG tablet Take 10 mg by mouth daily    Historical Provider, MD   losartan (COZAAR) 50 MG tablet Take 50 mg by mouth daily  Patient not taking: Reported on 2/21/2023    Historical Provider, MD   empagliflozin (JARDIANCE) 10 MG tablet Take 10 mg by mouth daily    Historical Provider, MD   ALPRAZolam (XANAX) 0.25 MG tablet Take 0.25 mg by mouth nightly.    Patient not taking: No sig reported    Historical Provider, MD   insulin lispro (HUMALOG) 100 UNIT/ML injection vial Inject 7 Units into the skin 2 times daily     Historical Provider, MD   Cholecalciferol (VITAMIN D3) 2000 UNITS CAPS Take 2,000 Units by mouth daily    Historical Provider, MD   Multiple Vitamins-Minerals (MULTIVITAMIN ADULT PO) Take 40 mg by mouth daily     Historical Provider, MD   aspirin EC 81 MG EC tablet Take 1 tablet by mouth daily 9/16/15   Sunita Coy DO       Current Medications:    Current Facility-Administered Medications: albuterol (PROVENTIL) nebulizer solution 2.5 mg, 2.5 mg, Nebulization, Q6H PRN  aspirin EC tablet 81 mg, 81 mg, Oral, Daily  escitalopram (LEXAPRO) tablet 10 mg, 10 mg, Oral, Daily  famotidine (PEPCID) tablet 20 mg, 20 mg, Oral, Daily  insulin glargine-yfgn (SEMGLEE-YFGN) injection vial 35 Units, 35 Units, SubCUTAneous, Nightly  losartan (COZAAR) tablet 50 mg, 50 mg, Oral, Daily  metoprolol succinate (TOPROL XL) extended release tablet 50 mg, 50 mg, Oral, Daily  traZODone (DESYREL) tablet 150 mg, 150 mg, Oral, Nightly  glucose chewable tablet 16 g, 4 tablet, Oral, PRN  dextrose bolus 10% 125 mL, 125 mL, IntraVENous, PRN **OR** dextrose bolus 10% 250 mL, 250 mL, IntraVENous, PRN  glucagon injection 1 mg, 1 mg, SubCUTAneous, PRN  dextrose 10 % infusion, , IntraVENous, Continuous PRN  insulin lispro (HUMALOG) injection vial 0-16 Units, 0-16 Units, SubCUTAneous, TID WC  insulin lispro (HUMALOG) injection vial 0-4 Units, 0-4 Units, SubCUTAneous, Nightly  perflutren lipid microspheres (DEFINITY) injection 1.5 mL, 1.5 mL, IntraVENous, ONCE PRN  sodium chloride flush 0.9 % injection 5-40 mL, 5-40 mL, IntraVENous, 2 times per day  sodium chloride flush 0.9 % injection 5-40 mL, 5-40 mL, IntraVENous, PRN  0.9 % sodium chloride infusion, , IntraVENous, PRN  ondansetron (ZOFRAN-ODT) disintegrating tablet 4 mg, 4 mg, Oral, Q8H PRN **OR** ondansetron (ZOFRAN) injection 4 mg, 4 mg, IntraVENous, Q6H PRN  acetaminophen (TYLENOL) tablet 650 mg, 650 mg, Oral, Q6H PRN **OR** acetaminophen (TYLENOL) suppository 650 mg, 650 mg, Rectal, Q6H PRN  polyethylene glycol (GLYCOLAX) packet 17 g, 17 g, Oral, Daily PRN  aluminum & magnesium hydroxide-simethicone (MAALOX) 200-200-20 MG/5ML suspension 30 mL, 30 mL, Oral, Q6H PRN  enoxaparin (LOVENOX) injection 40 mg, 40 mg, SubCUTAneous, Daily  nitroGLYCERIN (NITROSTAT) SL tablet 0.4 mg, 0.4 mg, SubLINGual, Q5 Min PRN  arformoterol tartrate (BROVANA) nebulizer solution 15 mcg, 15 mcg, Nebulization, BID  budesonide (PULMICORT) nebulizer suspension 250 mcg, 250 mcg, Nebulization, BID  atorvastatin (LIPITOR) tablet 80 mg, 80 mg, Oral, Daily    Allergies:  Gabapentin    Social History: There is no history of tobacco, alcohol, or illicit drug use. Family History:    Mother had two strokes and then  of MI at 76  No other family history of coronary disease or sudden death    Review of Systems:   Constitutional: Denies fatigue, fevers, chills or night sweats  ENT: Denies headaches, bleeding gums, or epistaxis   Cardiovascular: Denies peripheral edema. Chest pain and palpitations. Respiratory: Dyspnea as above. Denies cough, orthopnea or PND. She snores loudly but has never been tested for sleep apnea. Gastrointestinal: Denies nausea, vomiting, diarrhea, abdominal pain or dark/bloody stools. Genitourinary: Denies urgency, dysuria or hematuria. Musculoskeletal: Denies gait disturbance, myalgias or arthralgias. Integumentary: Denies rash or ulcerations   Neurological: Denies dizziness, syncope,  numbness or tingling  Psychiatric: Denies anxiety or depression. Endocrine: Denies temperature intolerance or recent weight changes. Hematologic/Lymphatic: Denies abnormal bruising or bleeding. Physical Exam:   /67   Pulse 67   Temp 97.9 °F (36.6 °C) (Temporal)   Resp 18   Ht 5' 2\" (1.575 m)   Wt 209 lb 14.1 oz (95.2 kg)   SpO2 94%   BMI 38.39 kg/m²   CONST:  Well developed, well nourished lady who appears of stated age. Awake, alert and cooperative. No apparent distress. HEENT:   Head- Normocephalic, atraumatic   Eyes- Conjunctivae pink  Throat- Oral mucosa pink and moist  Neck-  No stridor, jugular venous distention or carotid bruit. CHEST: Chest symmetrical and non-tender to palpation. Respirations unlabored. RESPIRATORY: Respirations unlabored. CARDIOVASCULAR:     Heart Ausculation- Regular rate and rhythm, no murmur,  s3, s4 or rub   PV: No lower extremity edema. No varicosities. Pedal pulses palpable,  ABDOMEN: Soft, non-tender to light palpation. Bowel sounds present. No palpable masses   MS: Good muscle strength and tone. No atrophy or abnormal movements. : Deferred  SKIN: Warm and dry   NEURO / PSYCH: Oriented to person, place and time. Speech clear and appropriate. Follows all commands.  Pleasant affect     Telemetry:  SR    ECG: SR, 63 bpm       Labs:   CBC:   Recent Labs 02/21/23  1222 02/22/23  0552   WBC 6.3 7.2   HGB 11.4* 10.0*   HCT 39.4 35.8    256     BMP:   Recent Labs     02/21/23  1222      K 4.8   CO2 25   BUN 19   CREATININE 1.2*   LABGLOM 49   CALCIUM 9.3     HGA1c:  Lab Results   Component Value Date    LABA1C 8.2 (H) 02/21/2023       proBNP:   Recent Labs     02/21/23  1222   PROBNP 179*     PT/INR:   Recent Labs     02/21/23  1222   PROTIME 11.2   INR 1.0       CARDIAC ENZYMES:  Recent Labs     02/21/23  1222 02/21/23  1436   TROPHS 23* 8      FASTING LIPID PANEL:  Lab Results   Component Value Date/Time    CHOL 156 07/02/2020 09:02 AM    HDL 60 07/02/2020 09:02 AM    LDLCALC 68 07/02/2020 09:02 AM    TRIG 140 07/02/2020 09:02 AM     LIVER PROFILE:  Recent Labs     02/21/23  1222   AST 34*   ALT 50*   LABALBU 4.3     Pulmonary CTA 2/21/2023:  FINDINGS:   No filling defect in the pulmonary arteries to suggest pulmonary arterial embolism. The heart is normal in size. No pericardial effusion. Atherosclerotic calcifications are associated with the coronary arteries. No mediastinal lymphadenopathy. No airspace opacity or pleural effusion. No pneumothorax. View of the upper abdomen shows postsurgical changes in   epigastric location. Impression:    No evidence of pulmonary embolism or acute pulmonary abnormality. CXR 2/21/2023:  FINDINGS:   The lungs are without acute focal process. There is no effusion or   pneumothorax. The cardiomediastinal silhouette is without acute process. The   osseous structures are without acute process. Impression:    No acute process. Assessment and Recommendations to follow by Dr. Keisha Lee. Electronically signed by SURESH Moreno on 2/22/2023 at 7:22 AM      I have personally spent 44 minutes in addition to the above and more than 50% of the total time involved in performing this consultation. I have personally and separately seen and evaluated the patient.   I personally and separately obtained the history and performed the physical exam.  I personally reviewed all of the above labs, imaging, history, review of systems, and data. All of the assessments and recommendations are personally from me. All of the above cardiac medical decisions are personally from me. Please see my additional contributions to the history, physical exam, assessment, and recommendations below. History of chief complaint:  She has had recurring chest pains for the past few years. They start in her right neck then moved down to her right chest and over to her left chest and then back and forth. They are burning discomfort. They are not associated with any particular physical activities and can occur at rest.  Yesterday she had recurring symptoms but this time she had associated palpitations and a heart rate in the 140s on her watch. She tried nitroglycerin after about 15 to 20 minutes and about 15 to 20 minutes after that her symptoms resolved. They have not recurred. Review of systems:     Heart: as above   Lungs: as above   Eyes: denies changes in vision or discharge. Ears: denies changes in hearing or pain. Nose: denies epistaxis or masses   Throat: denies sore throat or trouble swallowing. Neuro: denies numbness, tingling, tremors. Skin: denies rashes or itching. : denies hematuria, dysuria   GI: denies vomiting, diarrhea   Psych: denies mood changed, anxiety, depression. Physical exam:  /79   Pulse 73   Temp 98.5 °F (36.9 °C) (Oral)   Resp 18   Ht 5' 2\" (1.575 m)   Wt 209 lb 14.1 oz (95.2 kg)   SpO2 96%   BMI 38.39 kg/m²   Constitutional: A&O x3, communicates well, no acute distress. Eyes: extraocular muscles intact, PERRL. Normal lids & conjunctiva. No icterus. ENT: clear, no bleeding. No external masses. Lips normal formation. Neck: supple, full ROM, no JVD, no bruits, no lymphadenopathy. No masses. trachea midline.   Heart: regular rate & rhythm, normal S1 & S2, no abnormal murmurs.  No heave.  Lungs: CTA.  No accessory muscles.  Abd: Obese.  Soft, non-tender.  Normal bowel sounds.   Neuro: Full ROM X 4, EOMI, no tremors.  EXT: No bilateral lower extremity edema  Skin: warm, dry, intact.  Good turgor.  Psych: A&O x 3, normal behavior, not anxious.    Patient seen and examined.  Chart, labs & data reviewed.    A:  Atypical chest discomfort.  Troponin not consistent with an ACS..  Nonischemic coronary artery disease.  Reported 70% RCA stenosis in 2012 with negative by FFR for significance.  Stress test in 2015 showed no ischemia.  Hypertension  Chronic renal sufficiency  Obesity.  GERD  Uterine cancer.  Status post hysterectomy 2021.  Back DJD  Diabetes  PAT.  See below.      Rec:  I have ordered a Lexiscan Cardiolite stress test.  This has not been completed.  She had a run of PAT during the test.  Reportedly, this reproduced her symptoms.  EP has been consulted.  Sleep apnea evaluation for others.  I discussed her stress test results with her.  This now shows anteroapical ischemia.  CATH RISKS:  I discussed the risks and benefits of cardiac catheterization and percutaneous coronary intervention including but not limited to exposure to radiation, bleeding, infection, sedation, allergy, peripheral embolization, acute renal failure, vascular damage, emergent CABG, CVA, MI, and death.  He/she states that he/she understands this and agrees to proceed.    Electronically signed by Sree Hernandez DO on 2/22/2023 at 6:34 PM    Note: This report was completed using computerized voice recognition software. Every effort has been made to ensure accuracy, however; and invert and computerized transcription errors may be present.

## 2023-02-22 NOTE — CONSULTS
[facility]- [facility]  Consultation Report  PATIENT: Álvaro Cervantes  MEDICAL RECORD NUMBER: [id number]  DATE OF SERVICE:  2/22/2023  ATTENDING ELECTROPHYSIOLOGIST: Katarina Keller MD  PRIMARY ELECTROPHYSIOLOGIST: Katarina Keller MD  REFERRING PHYSICIAN: No ref. provider found and Yosi Wadsworth MD  CHIEF COMPLAINT: Chest pain     HPI: This is a 67 y.o. female with a history of   Patient Active Problem List   Diagnosis    CAD (coronary artery disease)    HTN (hypertension)    Dyslipidemia    Type 2 diabetes mellitus with diabetic chronic kidney disease (HCC)    GERD (gastroesophageal reflux disease)    FH: CAD (coronary artery disease)    Displacement of intervertebral disc    Lumbosacral radiculopathy    Spondylosis of lumbosacral region    Neurologic gait dysfunction    CKD stage 3 due to type 2 diabetes mellitus (HCC)    Neuropathy due to type 2 diabetes mellitus (HCC)    Vitamin D deficiency    HNP (herniated nucleus pulposus) with myelopathy, cervical    Herniation of nucleus pulposus    Chest heaviness    Headache    Acute respiratory failure due to COVID-19 (HCC)    COVID-19    Postoperative state    Endometrial cancer (HCC)    Intestinal adhesions    Acute chest pain   who presented to the hospital on 2/21/23 complaining of chest pain and elevated heart rate The patient has history of coronary artery disease, hypertension, hyperlipidemia, diabetes mellitus and CKD. The patient reports sudden onset of chest pain and elevated heart rate up to 140 bpm on 2/21/23 at 10.30 AM. In ED she was noted to be in NSR. Today while she was waiting to have nuclear stress test, she developed chest pain and palpitations. Telemetry monitor at 13.34 PM showed PATs at 140 bpm for 7 minutes.The patient denies any dyspnea, dizziness, syncope, orthopnea or paroxysmal nocturnal dyspnea. Cardiac electrophysiology service is consulted for PAT versus AFL.    Patient  Active Problem List    Diagnosis Date Noted    Acute chest pain 02/21/2023     Priority: Medium    Postoperative state 10/16/2021    Endometrial cancer ([facility] [address]     Intestinal adhesions     Acute respiratory failure due to COVID-19 ([address] 09/11/2021    COVID-19 09/11/2021    Chest heaviness 03/08/2016    Headache 03/08/2016    HNP (herniated nucleus pulposus) with myelopathy, cervical 01/14/2016    CKD stage 3 due to type 2 diabetes mellitus ([address] 10/30/2015    Neuropathy due to type 2 diabetes mellitus ([address] 10/30/2015    Vitamin D deficiency 10/30/2015    Neurologic gait dysfunction 10/28/2015    CAD (coronary artery disease)     HTN (hypertension)     Dyslipidemia     Type 2 diabetes mellitus with diabetic chronic kidney disease (HCC)     GERD (gastroesophageal reflux disease)     FH: CAD (coronary artery disease)     Displacement of intervertebral disc 07/09/2012    Lumbosacral radiculopathy 07/09/2012    Spondylosis of lumbosacral region 07/09/2012    Herniation of nucleus pulposus 07/09/2012       Past Medical History:   Diagnosis Date    CAD (coronary artery disease)     Chest heaviness 3/8/2016    Chronic kidney disease     DM (diabetes mellitus) ([address]     Dyslipidemia     Endometrial cancer ([address]     Endometrial cancer (HCC)     FH: CAD (coronary artery disease)     GERD (gastroesophageal reflux disease)     Headache 3/8/2016    HTN (hypertension)     Neuropathy     Spondylosis of lumbosacral region        Family History   Problem Relation Age of Onset    Heart Disease Mother     High Blood Pressure Mother     Cancer Father         lung and prostate    Cancer Sister         lung    Cancer Brother         lung    Cancer Sister         lung    Ovarian Cancer Neg Hx     Breast Cancer Neg Hx     Colon Cancer Neg Hx        Social History     Tobacco Use    Smoking status: Never    Smokeless tobacco: Never   Substance Use Topics    Alcohol use: No     Alcohol/week: 0.0 standard drinks       Current Facility-Administered Medications   Medication Dose Route Frequency Provider Last Rate Last Admin    albuterol (PROVENTIL) nebulizer solution 2.5 mg  2.5 mg Nebulization Q6H PRN Avtar Higgins MD        aspirin EC tablet 81 mg  81 mg Oral Daily Avtar Higgins MD   81 mg at 02/22/23 1107    escitalopram (LEXAPRO) tablet 10 mg  10 mg Oral Daily Avtar Higgins MD   10 mg at 02/22/23 1105    famotidine (PEPCID) tablet 20 mg  20 mg Oral Daily Avtar Higgins MD   20 mg at 02/22/23 1104    insulin glargine-yfgn (SEMGLEE-YFGN) injection vial 35 Units  35 Units SubCUTAneous Nightly Avtar Higgins MD   35 Units at 02/21/23 2127    losartan (COZAAR) tablet 50 mg  50 mg Oral Daily Avtar Higgins MD   50 mg at 02/22/23 1104    metoprolol succinate (TOPROL XL) extended release tablet 50 mg  50 mg Oral Daily Avtar Higgins MD        traZODone (DESYREL) tablet 150 mg  150 mg Oral Nightly Avtar Higgins MD   150 mg at 02/21/23 2226    glucose chewable tablet 16 g  4 tablet Oral PRN Avtar Higgins MD        dextrose bolus 10% 125 mL  125 mL IntraVENous PRASHLEY Higgins MD        Or    dextrose bolus 10% 250 mL  250 mL IntraVENous PRN Avtar Higgins MD        glucagon injection 1 mg  1 mg SubCUTAneous PRN Avtar Higgins MD        dextrose 10 % infusion   IntraVENous Continuous PRASHLEY Higgins MD        insulin lispro (HUMALOG) injection vial 0-16 Units  0-16 Units SubCUTAneous TID WC Avtar Higgnis MD        insulin lispro (HUMALOG) injection vial 0-4 Units  0-4 Units SubCUTAneous Nightly Avtar Higgins MD        sodium chloride flush 0.9 % injection 5-40 mL  5-40 mL IntraVENous 2 times per day Avtar Higgins MD   10 mL at 02/22/23 1108    sodium chloride flush 0.9 % injection 5-40 mL  5-40 mL IntraVENous PRN Avtar Higgins MD        0.9 % sodium chloride infusion   IntraVENous PRN Avtar Higgins MD        ondansetron (ZOFRAN-ODT) disintegrating tablet 4 mg  4 mg Oral Q8H PRN Avtar Higgins MD        Or ondansetron (ZOFRAN) injection 4 mg  4 mg IntraVENous Q6H PRN Gretel Rodriges MD        acetaminophen (TYLENOL) tablet 650 mg  650 mg Oral Q6H PRN Gretel Rodriges MD        Or    acetaminophen (TYLENOL) suppository 650 mg  650 mg Rectal Q6H PRN Gretel Rodriges MD        polyethylene glycol (GLYCOLAX) packet 17 g  17 g Oral Daily PRN Gretel Rodriges MD        aluminum & magnesium hydroxide-simethicone (MAALOX) 200-200-20 MG/5ML suspension 30 mL  30 mL Oral Q6H PRN Gretel Rodriges MD        enoxaparin (LOVENOX) injection 40 mg  40 mg SubCUTAneous Daily Gretel Rodriges MD   40 mg at 02/22/23 1107    nitroGLYCERIN (NITROSTAT) SL tablet 0.4 mg  0.4 mg SubLINGual Q5 Min PRN Gretel Rodriges MD   0.4 mg at 02/21/23 2324    arformoterol tartrate (BROVANA) nebulizer solution 15 mcg  15 mcg Nebulization BID Gretel Rodriges MD   15 mcg at 02/22/23 0954    budesonide (PULMICORT) nebulizer suspension 250 mcg  250 mcg Nebulization BID Gretel Rodriges MD   250 mcg at 02/22/23 4143    atorvastatin (LIPITOR) tablet 80 mg  80 mg Oral Daily Dominic Barboza MD   80 mg at 02/22/23 1105        Allergies   Allergen Reactions    Gabapentin Other (See Comments)     cassia     ROS:   Constitutional: Negative for fever, activity change and appetite change. HENT: Negative for epistaxis. Eyes: Negative for diploplia, blurred vision. Respiratory: Negative for cough. Positive for chest tightness. Negative for shortness of breath and wheezing. Cardiovascular: pertinent positives in HPI  Gastrointestinal: Negative for abdominal pain and blood in stool.    All other review of systems are negative     PHYSICAL EXAM:   Vitals:    02/22/23 0703 02/22/23 0915 02/22/23 0924 02/22/23 1537   BP: 135/67 (!) 147/66  132/79   Pulse: 67 60 60 73   Resp: 18 18  18   Temp: 97.9 °F (36.6 °C) 98.4 °F (36.9 °C)  98.5 °F (36.9 °C)   TempSrc: Temporal Oral  Oral   SpO2: 94% 93%  96%   Weight:       Height:          Constitutional: Well-developed, no acute distress  Eyes: conjunctivae normal, no xanthelasma   Ears, Nose, Throat: oral mucosa moist, no cyanosis   CV: no JVD. Regular rate and rhythm. Normal S1S2 and no S3. No murmurs, rubs, or gallops. PMI is nondisplaced  Lungs: clear to auscultation bilaterally, normal respiratory effort without used of accessory muscles  Abdomen: soft, non-tender, bowel sounds present, no masses or hepatomegaly   Musculoskeletal: no digital clubbing, no edema   Skin: warm, no rashes     I have personally reviewed the laboratory, cardiac diagnostic and radiographic testing as outlined below:    Data:    Recent Labs     02/21/23  1222 02/22/23  0552   WBC 6.3 7.2   HGB 11.4* 10.0*   HCT 39.4 35.8    256     Recent Labs     02/21/23  1222 02/22/23  0552    142   K 4.8 4.3    105   CO2 25 28   BUN 19 25*   CREATININE 1.2* 1.3*   CALCIUM 9.3 9.1      Lab Results   Component Value Date/Time    MG 2.5 02/22/2023 05:52 AM     No results for input(s): TSH in the last 72 hours. Recent Labs     02/21/23  1222   INR 1.0       CXR 2/21/23:   FINDINGS:   The lungs are without acute focal process. There is no effusion or   pneumothorax. The cardiomediastinal silhouette is without acute process. The   osseous structures are without acute process. Impression   No acute process. Telemetry 02/22/23 : NSR, PAT on 2/22/23 at 13.34 PM til 13.41 PM    EKG 2/22/23: NSR 72 bpm, PVC, Qtc 435 ms Please see scan in Cardiology. Echocardiogram 2/22/23:       Findings      Left Ventricle   Left ventricular internal dimensions were normal in diastole and systole. Mild asymmetric septal hypertrophy. Micro-bubble contrast injected to enhance left ventricular visualization. No regional wall motion abnormalities seen. Normal left ventricular ejection fraction. Ejection fraction is visually estimated at 70%. Normal left ventricular diastolic filling pattern.       Right Ventricle   Normal right ventricular size and function. Left Atrium   Normal sized left atrium. Interatrial septum appears intact. Right Atrium   Normal right atrium size. Mitral Valve   Structurally normal mitral valve. Mild mitral annular calcification. Tricuspid Valve   The tricuspid valve appears structurally normal.   Mild tricuspid regurgitation. RVSP is 35 mmHg. Aortic Valve   The aortic valve appears mildly sclerotic. Pulmonic Valve   Pulmonic valve is structurally normal.      Pericardial Effusion   No evidence of pericardial effusion. Aorta   Aortic root dimension within normal limits. Conclusions      Summary   Left ventricular internal dimensions were normal in diastole and systole. Mild asymmetric septal hypertrophy. No regional wall motion abnormalities seen. Normal left ventricular ejection fraction. Structurally normal mitral valve. Mild mitral annular calcification. The aortic valve appears mildly sclerotic. The tricuspid valve appears structurally normal.   Mild tricuspid regurgitation. Signature      ----------------------------------------------------------------   Electronically signed by Margarito Gallagher MD(Interpreting   physician) on 02/22/2023 03:32 PM   ----------------------------------------------------------------    Cardiac Catheterization 12/28/12:       Stress Test 2/21/23:   FINDINGS: The overall quality of the study was adequate. Low dose CT   images showed coronary artery calcification mostly in the left   coronary system. Left ventricular cavity size was noted to be normal.       Rotational analog analysis demonstrated no signal motion artifacts. TID 1.17. The gated SPECT stress imaging in the short, vertical long, and   horizontal long axis demonstrated a mild defect was present in the   anteroapical wall that was small sized by quantification.               The resting images reversibility with include perfusion in the   anteroapical defect Gated SPECT left ventricular ejection fraction was calculated to be   76%, with normal Myocardial wall motion. Impression       The myocardial perfusion imaging was abnormal.       The abnormality was a small area of anteroapical reversible defect. Left ventricular systolic function was normal, EF calculated at 76%. Coronary artery calcifications noted on low dose CT images. Overall low to intermediate risk myocardial perfusion study. Compared to previous study from August 2015 which showed no ischemia,   EF 78%. I have independently reviewed all of the ECGs and rhythm strips per above     Assessment/Plan: This is a 69 y.o. female with a history of   Patient Active Problem List   Diagnosis    CAD (coronary artery disease)    HTN (hypertension)    Dyslipidemia    Type 2 diabetes mellitus with diabetic chronic kidney disease (HCC)    GERD (gastroesophageal reflux disease)    FH: CAD (coronary artery disease)    Displacement of intervertebral disc    Lumbosacral radiculopathy    Spondylosis of lumbosacral region    Neurologic gait dysfunction    CKD stage 3 due to type 2 diabetes mellitus (HCC)    Neuropathy due to type 2 diabetes mellitus (HCC)    Vitamin D deficiency    HNP (herniated nucleus pulposus) with myelopathy, cervical    Herniation of nucleus pulposus    Chest heaviness    Headache    Acute respiratory failure due to COVID-19 ([address]    COVID-19    Postoperative state    Endometrial cancer (HCC)    Intestinal adhesions    Acute chest pain    who presents with PAT. 1. Supraventricular tachycardia  - Narrow complex with short RP.  - Likely paroxysmal atrial tachycardia. - Symptomatic.  - On Toprol XL. 2.  Coronary artery disease  - [facility] 12/28/12 showed 70% stenosis of mid RCA.  - Nuclear stress test 2/22/23 showed small anteroapical ischemia. - Plan for [facility] tomorrow per Cardiology. 3. Hypertension  - On Cozaar and Toprol XL.     4. Hyperlipidemia  - On Lipitor. 5. Diabetes mellitus  - On Insulin. 6. CKD  Estimated Creatinine Clearance: 44 mL/min (A) (based on SCr of 1.3 mg/dL (H)). Recommendations: Will increase Toprol XL to 50 mg BID. Await for [facility] results. Consider AAD therapy if fails Beta-blocker therapy. Keep potassium and magnesium at high normal levels. I have spent a total of 80  minutes with the patient and the family reviewing the above stated recommendations. And a total of >50% of that time involved face-to-face time providing counseling and or coordination of care with the other providers, reviewing records/tests, counseling/education of the patient, ordering medications/tests/procedures, coordinating care, and documenting clinical information in the EHR. Thank you for allowing me to participate in your patient's care. Please call me if there are any questions or concerns.       Charmayne Guadalajara, MD  Cardiac Electrophysiology  [address]  The [facility] V[facility] [place]: [facility] Electrophysiology  3:56 PM  2/22/2023

## 2023-02-22 NOTE — PROGRESS NOTES
Hospitalist Progress Note      Synopsis: Patient admitted on 2/21/2023 69 y.o. female with past medical history of CAD CKD hyperlipemia hypertension. Patient  was seen in the ED due to chest pain. She states that it started at  2200 on 2/20   before going to bed . She states that it  started in the mid chest and radiated into neck and went under both breasts  . She states that chest pain went into right shoulder She states that her HR went to the 140s . She states that it lasted all night. She reports shortness of breath dizziness. She reports no fever chills abdominal pain nausea or vomiting  . Patient states that she took ASA and Nitro and chest pain resolved. In the ED patient was afebrile RR 18  /64 99 % RA . Lab data reveals glucose 126  sodium 140 potassium 4.8   Trop 23 BUN 19 Scr 1.2    ALT 50 AST 34  WBC 6.3 HGB 11.4  . INR 1.0  COVID FLU neg  CTA no PE  CXR  neg  EKG NSR [address] 309 . Patient  will be admitted for further evaluation and treatment. Subjective    Feeling better notes episode of tachycardia while waiting for stress test.  Associated with chest pain and shortness of breath.  + CP or SOB  No fever or chills   No uncontrolled pain  No vomiting or diarrhea   No events reported overnight     Exam:  BP (!) 147/66   Pulse 60   Temp 98.4 °F (36.9 °C) (Oral)   Resp 18   Ht 5' 2\" (1.575 m)   Wt 209 lb 14.1 oz (95.2 kg)   SpO2 93%   BMI 38.39 kg/m²   General appearance: No apparent distress, appears stated age and cooperative. HEENT: Pupils equal, round, and reactive to light. Conjunctivae/corneas clear. Neck: Supple. No jugular venous distention. Trachea midline. Respiratory:  Normal respiratory effort. Clear to auscultation, bilaterally without Rales/Wheezes/Rhonchi. Cardiovascular: Regular rate and rhythm with normal S1/S2 without murmurs, rubs or gallops. Abdomen: Soft, non-tender, non-distended with normal bowel sounds.   Musculoskeletal: No clubbing, cyanosis or edema bilaterally. Brisk capillary refill. 2+radial pulses. Skin:  No rashes    Neurologic: awake, alert and following commands    Medications:  Reviewed    Infusion Medications    dextrose      sodium chloride       Scheduled Medications    aspirin EC  81 mg Oral Daily    escitalopram  10 mg Oral Daily    famotidine  20 mg Oral Daily    insulin glargine-yfgn  35 Units SubCUTAneous Nightly    losartan  50 mg Oral Daily    metoprolol succinate  50 mg Oral Daily    traZODone  150 mg Oral Nightly    insulin lispro  0-16 Units SubCUTAneous TID WC    insulin lispro  0-4 Units SubCUTAneous Nightly    sodium chloride flush  5-40 mL IntraVENous 2 times per day    enoxaparin  40 mg SubCUTAneous Daily    arformoterol tartrate  15 mcg Nebulization BID    budesonide  250 mcg Nebulization BID    atorvastatin  80 mg Oral Daily     PRN Meds: regadenoson, albuterol, glucose, dextrose bolus **OR** dextrose bolus, glucagon (rDNA), dextrose, perflutren lipid microspheres, sodium chloride flush, sodium chloride, ondansetron **OR** ondansetron, acetaminophen **OR** acetaminophen, polyethylene glycol, aluminum & magnesium hydroxide-simethicone, nitroGLYCERIN    I/O  No intake or output data in the 24 hours ending 02/22/23 1046    Labs:   Recent Labs     02/21/23  1222 02/22/23  0552   WBC 6.3 7.2   HGB 11.4* 10.0*   HCT 39.4 35.8    256       Recent Labs     02/21/23  1222 02/22/23  0552    142   K 4.8 4.3    105   CO2 25 28   BUN 19 25*   CREATININE 1.2* 1.3*   CALCIUM 9.3 9.1       Recent Labs     02/21/23  1222   PROT 7.7   ALKPHOS 110*   ALT 50*   AST 34*   BILITOT 0.4       Recent Labs     02/21/23  1222   INR 1.0       No results for input(s): CKTOTAL, TROPONINI in the last 72 hours.     Chronic labs:  Lab Results   Component Value Date    CHOL 140 02/22/2023    TRIG 116 02/22/2023    HDL 50 02/22/2023    LDLCALC 67 02/22/2023    TSH 2.260 10/07/2019    INR 1.0 02/21/2023    LABA1C 8.2 (H) 02/21/2023 Radiology:  Imaging studies reviewed today. ASSESSMENT:    Principal Problem:    Acute chest pain  Resolved Problems:    * No resolved hospital problems. *       PLAN:  Chest pain  Patient reports chest pain relieved with ASA and Nitro . Patient had cardiac cath in 2012  Trop 23  EKG NSR CXR neg CTA neg for PE   COVID  FLU neg Admit observation telemetry cardiology 2D echo ASA Nitro BB   -Nuclear stress test today demonstrating small area of anterior apical reversible defect new from 2015. Overall low to intermediate risk. SVT-episode of tachycardia noted while patient was waiting for stress test.  Electrophysiology consult     Hypertension : Toprol XL Losartan      IDDM : basal insulin poct glucose sliding scale carb controlled diet     Depression Lexapro      CAD /HLD : Lipitor ASA BB       Obesity : BMI 37 , history of gastric bypass      Chronic kidney disease stage 3 : Scr 1.2 stable      Transaminitis  : mild       Diet: Diet NPO  Code Status: Full Code  PT/OT Eval Status:     DVT Prophylaxis:     Recommended disposition at discharge: Anticipate home 24 to 48 hours pending completion of cardiology and EP eval    +++++++++++++++++++++++++++++++++++++++++++++++++  Lisandra Wiley MD   [facility].  +++++++++++++++++++++++++++++++++++++++++++++++++  NOTE: This report was transcribed using voice recognition software. Every effort was made to ensure accuracy; however, inadvertent computerized transcription errors may be present.

## 2023-02-22 NOTE — CARE COORDINATION
2/22/23 CM note. Pt admitted 2/21/23 OBS status for acute CP. Stress test today. Awaiting cardiologist review of SPECT imaging. PT lives independently in 5th floor apartment, uses elevator. Has O2 at 2L/NC only used on a PRN basis-does not know provider. Friends nearby able to assist as needed. Plan to return to independent living. Insurance provided transportation home. Pt has # and will call upon DC (pt uses for most medical appointments). No other DC needs identified at this time. Debora FINCH RN-BC  [phone]    Case Management Assessment  Initial Evaluation    Date/Time of Evaluation: 2/22/2023 3:36 PM  Assessment Completed by: Debora Tidwell RN    If patient is discharged prior to next notation, then this note serves as note for discharge by case management. Patient Name: Amish Morton                   YOB: 1955  Diagnosis: Acute chest pain [R07.9]                   Date / Time: 2/21/2023 11:48 AM    Patient Admission Status: Observation   Readmission Risk (Low < 19, Mod (19-27), High > 27): No data recorded  Current PCP: Yenni Salter MD  PCP verified by CM? Yes    Chart Reviewed: Yes      History Provided by: Patient  Patient Orientation: Alert and Oriented    Patient Cognition: Alert    Hospitalization in the last 30 days (Readmission):  No    If yes, Readmission Assessment in  Navigator will be completed.     Advance Directives:      Code Status: Full Code   Patient's Primary Decision Maker is:      Primary Decision MakerTianna Willson Child - [phone]    Secondary Decision Maker: Connie Alfonso - Child - [phone]    Discharge Planning:    Patient lives with: Alone Type of Home: House  Primary Care Giver: Self  Patient Support Systems include: Friends/Neighbors   Current Financial resources:    Current community resources:    Current services prior to admission: None            Current DME:              Type of Home Care services: None    ADLS  Prior functional level: Independent in ADLs/IADLs  Current functional level: Independent in ADLs/IADLs        Family can provide assistance at DC: Other (comment) (friends available)  Would you like Case Management to discuss the discharge plan with any other family members/significant others, and if so, who? No  Plans to Return to Present Housing: Yes  Other Identified Issues/Barriers to RETURNING to current housing: none  Potential Assistance needed at discharge: N/A  Patient expects to discharge to: [address] [place] [address] for transportation at discharge: Transportation home is insurance provided    Financial    Payor: Sherri Abdi / Plan: Velasquez Swanson / Product Type: *No Product type* /     Does insurance require precert for SNF: Yes    Potential assistance Purchasing Medications:    Meds-to-Beds request: Yes        Notes:    Factors facilitating achievement of predicted outcomes:  Motivated, Cooperative, and Pleasant    Barriers to discharge: none    Additional Case Management Notes: as above    The Plan for Transition of Care is related to the following treatment goals of Acute chest pain [R07.9]        Kalli Lion RN  Case Management Department  [phone]

## 2023-02-22 NOTE — PROGRESS NOTES
Dr. Gemma Laboy notified of patient complaining of palpitations and dizziness. EKG order obtained. SEEMA Stokes came to evaluate patient.    Mk Loyd RN

## 2023-02-22 NOTE — PROGRESS NOTES
Lexiscan Stress Test:    Reason for study: Chest pain    Resting EKG showed Sinus rhythm  Exam:  Heart - regular, Lungs- clear    Patient received 0.4mg Lexiscan per protocol    Symptoms: No chest pain, No short of breath    EKG:  No ischemia or arrhythmia during Lexiscan infusion. Maximal heart rate 99         Peak /50 mmHg       Post test complications: None    SPECT image report pending.     Electronically signed by Mandy Martinez MD on 2/22/2023 at 1:32 PM

## 2023-02-23 VITALS
SYSTOLIC BLOOD PRESSURE: 111 MMHG | BODY MASS INDEX: 38.62 KG/M2 | HEIGHT: 62 IN | WEIGHT: 209.88 LBS | HEART RATE: 63 BPM | TEMPERATURE: 97.9 F | DIASTOLIC BLOOD PRESSURE: 67 MMHG | RESPIRATION RATE: 18 BRPM | OXYGEN SATURATION: 94 %

## 2023-02-23 PROBLEM — I47.1 SVT (SUPRAVENTRICULAR TACHYCARDIA) (HCC): Status: ACTIVE | Noted: 2023-02-23

## 2023-02-23 PROBLEM — I47.10 SVT (SUPRAVENTRICULAR TACHYCARDIA): Status: ACTIVE | Noted: 2023-02-23

## 2023-02-23 LAB
ABO/RH: NORMAL
ANION GAP SERPL CALCULATED.3IONS-SCNC: 6 MMOL/L (ref 7–16)
ANTIBODY SCREEN: NORMAL
BUN BLDV-MCNC: 22 MG/DL (ref 6–23)
CALCIUM SERPL-MCNC: 8.6 MG/DL (ref 8.6–10.2)
CHLORIDE BLD-SCNC: 108 MMOL/L (ref 98–107)
CO2: 26 MMOL/L (ref 22–29)
CREAT SERPL-MCNC: 1.1 MG/DL (ref 0.5–1)
EKG ATRIAL RATE: 72 BPM
EKG P AXIS: 29 DEGREES
EKG P-R INTERVAL: 152 MS
EKG Q-T INTERVAL: 398 MS
EKG QRS DURATION: 68 MS
EKG QTC CALCULATION (BAZETT): 435 MS
EKG R AXIS: 1 DEGREES
EKG T AXIS: 76 DEGREES
EKG VENTRICULAR RATE: 72 BPM
GFR SERPL CREATININE-BSD FRML MDRD: 54 ML/MIN/1.73
GLUCOSE BLD-MCNC: 108 MG/DL (ref 74–99)
LV EF: 60 %
LVEF MODALITY: NORMAL
METER GLUCOSE: 111 MG/DL (ref 74–99)
METER GLUCOSE: 112 MG/DL (ref 74–99)
POC ACT LR: 274 SECONDS
POTASSIUM REFLEX MAGNESIUM: 4.6 MMOL/L (ref 3.5–5)
SODIUM BLD-SCNC: 140 MMOL/L (ref 132–146)

## 2023-02-23 PROCEDURE — 2709999900 HC NON-CHARGEABLE SUPPLY

## 2023-02-23 PROCEDURE — 36415 COLL VENOUS BLD VENIPUNCTURE: CPT

## 2023-02-23 PROCEDURE — 2580000003 HC RX 258: Performed by: INTERNAL MEDICINE

## 2023-02-23 PROCEDURE — 93571 IV DOP VEL&/PRESS C FLO 1ST: CPT | Performed by: INTERNAL MEDICINE

## 2023-02-23 PROCEDURE — 4A023N7 MEASUREMENT OF CARDIAC SAMPLING AND PRESSURE, LEFT HEART, PERCUTANEOUS APPROACH: ICD-10-PCS | Performed by: INTERNAL MEDICINE

## 2023-02-23 PROCEDURE — 93571 IV DOP VEL&/PRESS C FLO 1ST: CPT

## 2023-02-23 PROCEDURE — 2500000003 HC RX 250 WO HCPCS

## 2023-02-23 PROCEDURE — 6360000002 HC RX W HCPCS: Performed by: FAMILY MEDICINE

## 2023-02-23 PROCEDURE — C1894 INTRO/SHEATH, NON-LASER: HCPCS

## 2023-02-23 PROCEDURE — 93242 EXT ECG>48HR<7D RECORDING: CPT

## 2023-02-23 PROCEDURE — G0378 HOSPITAL OBSERVATION PER HR: HCPCS

## 2023-02-23 PROCEDURE — 86900 BLOOD TYPING SEROLOGIC ABO: CPT

## 2023-02-23 PROCEDURE — 93458 L HRT ARTERY/VENTRICLE ANGIO: CPT

## 2023-02-23 PROCEDURE — 85347 COAGULATION TIME ACTIVATED: CPT

## 2023-02-23 PROCEDURE — B2111ZZ FLUOROSCOPY OF MULTIPLE CORONARY ARTERIES USING LOW OSMOLAR CONTRAST: ICD-10-PCS | Performed by: INTERNAL MEDICINE

## 2023-02-23 PROCEDURE — 93010 ELECTROCARDIOGRAM REPORT: CPT | Performed by: INTERNAL MEDICINE

## 2023-02-23 PROCEDURE — C1887 CATHETER, GUIDING: HCPCS

## 2023-02-23 PROCEDURE — 99233 SBSQ HOSP IP/OBS HIGH 50: CPT | Performed by: INTERNAL MEDICINE

## 2023-02-23 PROCEDURE — 86850 RBC ANTIBODY SCREEN: CPT

## 2023-02-23 PROCEDURE — 6370000000 HC RX 637 (ALT 250 FOR IP): Performed by: FAMILY MEDICINE

## 2023-02-23 PROCEDURE — 6370000000 HC RX 637 (ALT 250 FOR IP): Performed by: INTERNAL MEDICINE

## 2023-02-23 PROCEDURE — 86901 BLOOD TYPING SEROLOGIC RH(D): CPT

## 2023-02-23 PROCEDURE — 93458 L HRT ARTERY/VENTRICLE ANGIO: CPT | Performed by: INTERNAL MEDICINE

## 2023-02-23 PROCEDURE — 6370000000 HC RX 637 (ALT 250 FOR IP): Performed by: NURSE PRACTITIONER

## 2023-02-23 PROCEDURE — 96372 THER/PROPH/DIAG INJ SC/IM: CPT

## 2023-02-23 PROCEDURE — 6360000002 HC RX W HCPCS

## 2023-02-23 PROCEDURE — 4A033BC MEASUREMENT OF ARTERIAL PRESSURE, CORONARY, PERCUTANEOUS APPROACH: ICD-10-PCS | Performed by: INTERNAL MEDICINE

## 2023-02-23 PROCEDURE — 82962 GLUCOSE BLOOD TEST: CPT

## 2023-02-23 PROCEDURE — 1200000000 HC SEMI PRIVATE

## 2023-02-23 PROCEDURE — 80048 BASIC METABOLIC PNL TOTAL CA: CPT

## 2023-02-23 PROCEDURE — C1769 GUIDE WIRE: HCPCS

## 2023-02-23 RX ORDER — ACETAMINOPHEN 325 MG/1
650 TABLET ORAL EVERY 4 HOURS PRN
Status: DISCONTINUED | OUTPATIENT
Start: 2023-02-23 | End: 2023-02-23 | Stop reason: SDUPTHER

## 2023-02-23 RX ORDER — SODIUM CHLORIDE 9 MG/ML
INJECTION, SOLUTION INTRAVENOUS CONTINUOUS
Status: DISCONTINUED | OUTPATIENT
Start: 2023-02-23 | End: 2023-02-23 | Stop reason: HOSPADM

## 2023-02-23 RX ORDER — METOPROLOL SUCCINATE 50 MG/1
50 TABLET, EXTENDED RELEASE ORAL 2 TIMES DAILY
Qty: 60 TABLET | Refills: 3 | Status: SHIPPED | OUTPATIENT
Start: 2023-02-23

## 2023-02-23 RX ORDER — ASPIRIN 81 MG/1
243 TABLET, CHEWABLE ORAL ONCE
Status: COMPLETED | OUTPATIENT
Start: 2023-02-23 | End: 2023-02-23

## 2023-02-23 RX ORDER — OXYCODONE HYDROCHLORIDE AND ACETAMINOPHEN 5; 325 MG/1; MG/1
1 TABLET ORAL EVERY 4 HOURS PRN
Status: DISCONTINUED | OUTPATIENT
Start: 2023-02-23 | End: 2023-02-23 | Stop reason: HOSPADM

## 2023-02-23 RX ADMIN — LOSARTAN POTASSIUM 50 MG: 50 TABLET, FILM COATED ORAL at 08:40

## 2023-02-23 RX ADMIN — METOPROLOL SUCCINATE 50 MG: 50 TABLET, EXTENDED RELEASE ORAL at 08:39

## 2023-02-23 RX ADMIN — ASPIRIN 243 MG: 81 TABLET, CHEWABLE ORAL at 09:35

## 2023-02-23 RX ADMIN — FAMOTIDINE 20 MG: 20 TABLET, FILM COATED ORAL at 08:40

## 2023-02-23 RX ADMIN — SODIUM CHLORIDE: 9 INJECTION, SOLUTION INTRAVENOUS at 12:26

## 2023-02-23 RX ADMIN — SODIUM CHLORIDE: 9 INJECTION, SOLUTION INTRAVENOUS at 00:09

## 2023-02-23 RX ADMIN — ESCITALOPRAM OXALATE 10 MG: 10 TABLET ORAL at 08:39

## 2023-02-23 RX ADMIN — ASPIRIN 81 MG: 81 TABLET, COATED ORAL at 08:39

## 2023-02-23 RX ADMIN — ATORVASTATIN CALCIUM 80 MG: 40 TABLET, FILM COATED ORAL at 08:39

## 2023-02-23 RX ADMIN — ENOXAPARIN SODIUM 40 MG: 100 INJECTION SUBCUTANEOUS at 08:39

## 2023-02-23 NOTE — PROCEDURES
Procedure:    Left heart catheterization with coronary angiography  IFR of the proximal RCA    Physician: Castro Hernandez DO. Assistant: none    Indication: Chest discomfort, abnormal stress test.  AUC: 7  AUC indication: 16    Complication: None. Sedation: Intravenous Versed. Anesthesia: Xylocaine, fentanyl     Sedation time: I was present for sedation and ministration at 11/20/2011 and I ended sedation at 1144 for a total face-to-face sedation time of 24 minutes. Estimated blood loss: Minimal    Specimens: none    Contrast used: 70 cc    Hemodynamics:  Opening Aortic pressure: 857/18  LV systolic pressure: 815  LVEDP: 17  No significant gradient across the aortic valve    iFR of the proximal RCA: 0.99    Angiographic Results/findings:  Left Main: No angiographically significant stenosis. LAD: No angiographically significant stenosis. D1: No angiographically significant stenosis. Cx: No angiographically significant stenosis. OM1: Bifurcating vessel. No angiographically significant stenosis. Ramus: Tiny vessel. RCA: Dominant. Proximal diffuse 60 to 70% stenosis. Negative IFR. PDA: No angiographically significant stenosis. PLB: No angiographically significant stenosis. LV: Normal LV size and systolic function. No wall motion abnormalities. Ejection fraction 60%    Procedure:   After obtaining informed consent the patient was taken to the cardiac Cath Lab where the area over the right radial artery was prepped and draped in a sterile fashion. Using ultrasound guidance and a micropuncture technique a 6 [place]vian slender rain sheath was placed in the right radial artery. This was aspirated & flushed several times throughout the procedure. This was medicated with verapamil and nitroglycerin. They were given heparin systemically. A 5 [place] TIG catheter was advanced over a wire to the root of the aorta. It was aspirated & flushed with saline. Pressures were obtained.   It was filled with contrast.  This was then manipulated into the left main coronary artery. 4 orthogonal views were obtained. A TIG catheter was then manipulated into the right coronary artery and to orthogonal views were then obtained. A 5 Turkmen angled pigtail was then advanced & manipulated into the left ventricle. This was then aspirated & flushed with saline & pressures were obtained. An BRISENO view was then obtained. The catheter was then aspirated & flushed with saline once again & pull back pressures were then obtained across the aortic vlave. The right radial artery sheath was removed and a vasc band was then placed with good patent hemostasis. They tolerated the procedure well with no complications. She was anticoagulated with heparin to maintain an ACT greater than 250. A 6 Turkmen R4 guiding catheter was advanced and manipulated into the right coronary artery. An IFR wire was prepped outside the body. The transducer was then normalized at the tip of the guide. The transducer was then advanced well beyond the RCA stenosis. 3 IFR's were performed. All 3 were 0.99. The wire was removed. A follow-up angiogram demonstrated no complications. The right radial artery sheath was removed and a vasc band was then placed with good patent hemostasis. They tolerated the procedure well with no complications. Note: This report was completed using computerized voice recognition software. Every effort has been made to ensure accuracy, however; and invert and computerized transcription errors may be present.

## 2023-02-23 NOTE — PROGRESS NOTES
Hospitalist Progress Note      Synopsis: Patient admitted on 2/21/2023 69 y.o. female with past medical history of CAD CKD hyperlipemia hypertension. Patient  was seen in the ED due to chest pain. She states that it started at  2200 on 2/20   before going to bed . She states that it  started in the mid chest and radiated into neck and went under both breasts  . She states that chest pain went into right shoulder She states that her HR went to the 140s . She states that it lasted all night. She reports shortness of breath dizziness. She reports no fever chills abdominal pain nausea or vomiting  . Patient states that she took ASA and Nitro and chest pain resolved. In the ED patient was afebrile RR 18  /64 99 % RA . Lab data reveals glucose 126  sodium 140 potassium 4.8   Trop 23 BUN 19 Scr 1.2    ALT 50 AST 34  WBC 6.3 HGB 11.4  . INR 1.0  COVID FLU neg  CTA no PE  CXR  neg  EKG NSR Dimer 309 . Patient  will be admitted for further evaluation and treatment. Subjective    Feeling better notes episode of tachycardia while waiting for stress test.  Associated with chest pain and shortness of breath.  + CP or SOB  No fever or chills   No uncontrolled pain  No vomiting or diarrhea   No events reported overnight     Exam:  /67   Pulse 63   Temp 97.9 °F (36.6 °C) (Temporal)   Resp 18   Ht 5' 2\" (1.575 m)   Wt 209 lb 14.1 oz (95.2 kg)   SpO2 94%   BMI 38.39 kg/m²   General appearance: No apparent distress, appears stated age and cooperative. HEENT: Pupils equal, round, and reactive to light. Conjunctivae/corneas clear. Neck: Supple. No jugular venous distention. Trachea midline. Respiratory:  Normal respiratory effort. Clear to auscultation, bilaterally without Rales/Wheezes/Rhonchi. Cardiovascular: Regular rate and rhythm with normal S1/S2 without murmurs, rubs or gallops. Abdomen: Soft, non-tender, non-distended with normal bowel sounds.   Musculoskeletal: No clubbing, cyanosis or edema bilaterally. Brisk capillary refill. 2+radial pulses. Skin:  No rashes    Neurologic: awake, alert and following commands    Medications:  Reviewed    Infusion Medications    dextrose      sodium chloride       Scheduled Medications    metoprolol succinate  50 mg Oral BID    aspirin EC  81 mg Oral Daily    escitalopram  10 mg Oral Daily    famotidine  20 mg Oral Daily    insulin glargine-yfgn  35 Units SubCUTAneous Nightly    losartan  50 mg Oral Daily    traZODone  150 mg Oral Nightly    insulin lispro  0-16 Units SubCUTAneous TID WC    insulin lispro  0-4 Units SubCUTAneous Nightly    sodium chloride flush  5-40 mL IntraVENous 2 times per day    enoxaparin  40 mg SubCUTAneous Daily    arformoterol tartrate  15 mcg Nebulization BID    budesonide  250 mcg Nebulization BID    atorvastatin  80 mg Oral Daily     PRN Meds: albuterol, glucose, dextrose bolus **OR** dextrose bolus, glucagon (rDNA), dextrose, sodium chloride flush, sodium chloride, ondansetron **OR** ondansetron, acetaminophen **OR** acetaminophen, polyethylene glycol, aluminum & magnesium hydroxide-simethicone, nitroGLYCERIN    I/O  No intake or output data in the 24 hours ending 02/23/23 0941    Labs:   Recent Labs     02/21/23  1222 02/22/23  0552   WBC 6.3 7.2   HGB 11.4* 10.0*   HCT 39.4 35.8    256         Recent Labs     02/21/23  1222 02/22/23  0552 02/23/23  0655    142 140   K 4.8 4.3 4.6    105 108*   CO2 25 28 26   BUN 19 25* 22   CREATININE 1.2* 1.3* 1.1*   CALCIUM 9.3 9.1 8.6         Recent Labs     02/21/23  1222   PROT 7.7   ALKPHOS 110*   ALT 50*   AST 34*   BILITOT 0.4         Recent Labs     02/21/23  1222   INR 1.0         No results for input(s): CKTOTAL, TROPONINI in the last 72 hours.     Chronic labs:  Lab Results   Component Value Date    CHOL 140 02/22/2023    TRIG 116 02/22/2023    HDL 50 02/22/2023    LDLCALC 67 02/22/2023    TSH 1.260 02/22/2023    INR 1.0 02/21/2023    LABA1C 8.2 (H) 02/21/2023       Radiology:  Imaging studies reviewed today. ASSESSMENT:    Principal Problem:    Acute chest pain  Active Problems:    Atrial tachycardia (HCC)  Resolved Problems:    * No resolved hospital problems. *       PLAN:  Chest pain  Patient reports chest pain relieved with ASA and Nitro . Patient had cardiac cath in 2012  Trop 23  EKG NSR CXR neg CTA neg for PE   COVID  FLU neg Admit observation telemetry cardiology 2D echo ASA Nitro BB   -Nuclear stress test today demonstrating small area of anterior apical reversible defect new from 2015. Overall low to intermediate risk. Cardiology consult appreciated-plan for left heart catheterization today    SVT-episode of tachycardia noted while patient was waiting for stress test.  Electrophysiology consult stated-Toprol XL increased     Hypertension : Toprol XL Losartan      IDDM : basal insulin poct glucose sliding scale carb controlled diet     Depression Lexapro      CAD /HLD : Lipitor ASA BB       Obesity : BMI 37 , history of gastric bypass      Chronic kidney disease stage 3 : Scr 1.2 stable      Transaminitis  : mild       Diet: Diet NPO Exceptions are: Sips of Water with Meds  Code Status: Full Code  PT/OT Eval Status:     DVT Prophylaxis:     Recommended disposition at discharge: Anticipate home 24 to 48 hours pending completion of cardiology and EP eval    +++++++++++++++++++++++++++++++++++++++++++++++++  Berenice Holley MD   [facility].  +++++++++++++++++++++++++++++++++++++++++++++++++  NOTE: This report was transcribed using voice recognition software. Every effort was made to ensure accuracy; however, inadvertent computerized transcription errors may be present.

## 2023-02-23 NOTE — CARE COORDINATION
2/23/23 Update CM note. Pt admitted 2/21/23 for acute chest pain. Stress test yesterday, cardiac cath this am.  Per attending-anticipate home 24 to 48 hours pending completion of cardiology and EP workups. Discharge plan to return home, pt to call insurance provided transportation for ride home upon DC. No other needs identified at this time.   Dari ROSALESN RN-BC  [phone]

## 2023-02-23 NOTE — PROGRESS NOTES
[facility],2[address] Floor and [address] [facility] Electrophysiology  Inpatient progress note   PATIENT: Noemi Gracia  MEDICAL RECORD NUMBER: [id number]  DATE OF SERVICE:  2/23/2023  ATTENDING ELECTROPHYSIOLOGIST:Katarina Rosales MD  PRIMARY ELECTROPHYSIOLOGIST: Bowen Tavarez MD  REFERRING PHYSICIAN: Dr Umm Chan , PCP Ollie Harrison MD  CHIEF COMPLAINT: Chest pain     HPI: This is a 69 y.o. female with a history of   Patient Active Problem List   Diagnosis    CAD (coronary artery disease)    HTN (hypertension)    Dyslipidemia    Type 2 diabetes mellitus with diabetic chronic kidney disease (HCC)    GERD (gastroesophageal reflux disease)    FH: CAD (coronary artery disease)    Displacement of intervertebral disc    Lumbosacral radiculopathy    Spondylosis of lumbosacral region    Neurologic gait dysfunction    CKD stage 3 due to type 2 diabetes mellitus ([facility]    Neuropathy due to type 2 diabetes mellitus (HCC)    Vitamin D deficiency    HNP (herniated nucleus pulposus) with myelopathy, cervical    Herniation of nucleus pulposus    Chest heaviness    Headache    Acute respiratory failure due to COVID-19 [facility])    COVID-19    Postoperative state    Endometrial cancer (HCC)    Intestinal adhesions    Acute chest pain    Atrial tachycardia (HCC)    SVT (supraventricular tachycardia) ([facility]   who presented to the hospital on 2/21/23 complaining of chest pain and elevated heart rate The patient has history of coronary artery disease, hypertension, hyperlipidemia, diabetes mellitus and CKD. The patient reports sudden onset of chest pain and elevated heart rate up to 140 bpm on 2/21/23 at 10.30 AM. In ED she was noted to be in NSR. Today while she was waiting to have nuclear stress test, she developed chest pain and palpitations. Telemetry monitor at 13.34 PM showed PATs at 140 bpm for 7 minutes. The patient denies any dyspnea, dizziness, syncope, orthopnea or paroxysmal nocturnal dyspnea.  Cardiac electrophysiology service is consulted for PAT versus AFL.    2/23/2023: Patient lying in bed in no acute distress. She is n.p.o. for likely heart cath today. She has had no further PAT/PAF L overnight. She denies any palpitations or chest pain through the night but does state that her pulse ox dropped briefly last evening while awake but she did not feel short of breath or have any trouble breathing.     Patient Active Problem List    Diagnosis Date Noted    SVT (supraventricular tachycardia) ([address] 02/23/2023     Priority: Medium    Atrial tachycardia ([address] 02/22/2023     Priority: Medium    Acute chest pain 02/21/2023     Priority: Medium    Postoperative state 10/16/2021    Endometrial cancer ([address]     Intestinal adhesions     Acute respiratory failure due to COVID-19 ([address] 09/11/2021    COVID-19 09/11/2021    Chest heaviness 03/08/2016    Headache 03/08/2016    HNP (herniated nucleus pulposus) with myelopathy, cervical 01/14/2016    CKD stage 3 due to type 2 diabetes mellitus ([address] 10/30/2015    Neuropathy due to type 2 diabetes mellitus ([address] 10/30/2015    Vitamin D deficiency 10/30/2015    Neurologic gait dysfunction 10/28/2015    CAD (coronary artery disease)     HTN (hypertension)     Dyslipidemia     Type 2 diabetes mellitus with diabetic chronic kidney disease (HCC)     GERD (gastroesophageal reflux disease)     FH: CAD (coronary artery disease)     Displacement of intervertebral disc 07/09/2012    Lumbosacral radiculopathy 07/09/2012    Spondylosis of lumbosacral region 07/09/2012    Herniation of nucleus pulposus 07/09/2012       Past Medical History:   Diagnosis Date    CAD (coronary artery disease)     Chest heaviness 3/8/2016    Chronic kidney disease     DM (diabetes mellitus) ([address]     Dyslipidemia     Endometrial cancer ([address]     Endometrial cancer (HCC)     FH: CAD (coronary artery disease)     GERD (gastroesophageal reflux disease)     Headache 3/8/2016    HTN (hypertension)     Neuropathy Spondylosis of lumbosacral region        Family History   Problem Relation Age of Onset    Heart Disease Mother     High Blood Pressure Mother     Cancer Father         lung and prostate    Cancer Sister         lung    Cancer Brother         lung    Cancer Sister         lung    Ovarian Cancer Neg Hx     Breast Cancer Neg Hx     Colon Cancer Neg Hx        Social History     Tobacco Use    Smoking status: Never    Smokeless tobacco: Never   Substance Use Topics    Alcohol use: No     Alcohol/week: 0.0 standard drinks       Current Facility-Administered Medications   Medication Dose Route Frequency Provider Last Rate Last Admin    metoprolol succinate (TOPROL XL) extended release tablet 50 mg  50 mg Oral BID Alexis Dary, APRN - CNP   50 mg at 02/23/23 0839    albuterol (PROVENTIL) nebulizer solution 2.5 mg  2.5 mg Nebulization Q6H PRN Lexus Bautista MD        aspirin EC tablet 81 mg  81 mg Oral Daily Lexus Bautista MD   81 mg at 02/23/23 0839    escitalopram (LEXAPRO) tablet 10 mg  10 mg Oral Daily Lexus Bautista MD   10 mg at 02/23/23 0839    famotidine (PEPCID) tablet 20 mg  20 mg Oral Daily Lexus Bautista MD   20 mg at 02/23/23 0840    insulin glargine-yfgn (SEMGLEE-YFGN) injection vial 35 Units  35 Units SubCUTAneous Nightly Lexus Bautista MD   35 Units at 02/22/23 2112    losartan (COZAAR) tablet 50 mg  50 mg Oral Daily Lexus Bautista MD   50 mg at 02/23/23 0840    traZODone (DESYREL) tablet 150 mg  150 mg Oral Nightly Lexus Bautista MD   150 mg at 02/22/23 2052    glucose chewable tablet 16 g  4 tablet Oral PRN Lexus Bautista MD        dextrose bolus 10% 125 mL  125 mL IntraVENous PRN Lexus Bautista MD        Or    dextrose bolus 10% 250 mL  250 mL IntraVENous PRN Lexus Bautista MD        glucagon injection 1 mg  1 mg SubCUTAneous PRN Lexus Bautista MD        dextrose 10 % infusion   IntraVENous Continuous PRN Lexus Bautista MD        insulin lispro (HUMALOG) injection vial 0-16 Units  0-16 Units SubCUTAneous TID  Chyna Peace MD        insulin lispro (HUMALOG) injection vial 0-4 Units  0-4 Units SubCUTAneous Nightly Chyna Peace MD        sodium chloride flush 0.9 % injection 5-40 mL  5-40 mL IntraVENous 2 times per day Chyna Peace MD   10 mL at 02/22/23 2057    sodium chloride flush 0.9 % injection 5-40 mL  5-40 mL IntraVENous PRN Chyna Peace MD        0.9 % sodium chloride infusion   IntraVENous PRN Chyna Peace MD        ondansetron (ZOFRAN-ODT) disintegrating tablet 4 mg  4 mg Oral Q8H PRN Chyna Peace MD        Or    ondansetron [facility] injection 4 mg  4 mg IntraVENous Q6H PRN Chyna Peace MD        acetaminophen (TYLENOL) tablet 650 mg  650 mg Oral Q6H PRN Chyna Peace MD        Or    acetaminophen (TYLENOL) suppository 650 mg  650 mg Rectal Q6H PRN Chyna Peace MD        polyethylene glycol (GLYCOLAX) packet 17 g  17 g Oral Daily PRN Chyna Peace MD        aluminum & magnesium hydroxide-simethicone (MAALOX) 200-200-20 MG/5ML suspension 30 mL  30 mL Oral Q6H PRN Chyna Peace MD        enoxaparin (LOVENOX) injection 40 mg  40 mg SubCUTAneous Daily Chyna Peace MD   40 mg at 02/23/23 0839    nitroGLYCERIN (NITROSTAT) SL tablet 0.4 mg  0.4 mg SubLINGual Q5 Min PRN Chyna Peace MD   0.4 mg at 02/21/23 2324    arformoterol tartrate (BROVANA) nebulizer solution 15 mcg  15 mcg Nebulization BID Chyna Peace MD   15 mcg at 02/22/23 2043    budesonide (PULMICORT) nebulizer suspension 250 mcg  250 mcg Nebulization BID Chyna Peace MD   250 mcg at 02/22/23 2043    atorvastatin (LIPITOR) tablet 80 mg  80 mg Oral Daily Rachel Paul MD   80 mg at 02/23/23 1854        Allergies   Allergen Reactions    Gabapentin Other (See Comments)     cassia     ROS:   Constitutional: Negative for fever, activity change and appetite change. HENT: Negative for epistaxis. Eyes: Negative for diploplia, blurred vision. Respiratory: Negative for cough.  Positive for chest tightness. Negative for shortness of breath and wheezing. Cardiovascular: pertinent positives in HPI  Gastrointestinal: Negative for abdominal pain and blood in stool. PHYSICAL EXAM:   Vitals:    02/22/23 0924 02/22/23 1537 02/22/23 2052 02/23/23 0754   BP:  132/79 (!) 145/60 111/67   Pulse: 60 73 76 63   Resp:  18 18 18   Temp:  98.5 °F (36.9 °C) 97.4 °F (36.3 °C) 97.9 °F (36.6 °C)   TempSrc:  Oral Oral Temporal   SpO2:  96% 90% 94%   Weight:       Height:          Constitutional: Well-developed, no acute distress  Eyes: conjunctivae normal, no xanthelasma   Ears, Nose, Throat: oral mucosa moist, no cyanosis   CV: no JVD. Regular rate and rhythm. Normal S1S2 and no S3. No murmurs, rubs, or gallops. PMI is nondisplaced  Lungs: clear to auscultation bilaterally, normal respiratory effort without used of accessory muscles  Abdomen: soft, non-tender, bowel sounds present, obese/rounded  Musculoskeletal: no digital clubbing, no edema   Skin: warm, no rashes     I have personally reviewed the laboratory, cardiac diagnostic and radiographic testing as outlined below:    Data:    Recent Labs     02/21/23  1222 02/22/23  0552   WBC 6.3 7.2   HGB 11.4* 10.0*   HCT 39.4 35.8    256     Recent Labs     02/21/23  1222 02/22/23  0552 02/23/23  0655    142 140   K 4.8 4.3 4.6    105 108*   CO2 25 28 26   BUN 19 25* 22   CREATININE 1.2* 1.3* 1.1*   CALCIUM 9.3 9.1 8.6      Lab Results   Component Value Date/Time    MG 2.5 02/22/2023 05:52 AM     Recent Labs     02/22/23  0552   TSH 1.260     Recent Labs     02/21/23  1222   INR 1.0       CXR 2/21/23:   FINDINGS:   The lungs are without acute focal process. There is no effusion or   pneumothorax. The cardiomediastinal silhouette is without acute process. The   osseous structures are without acute process. Impression   No acute process.      Telemetry 02/23/23 : NSR rates in the 60s  PAT on 2/22/23 at 13.34 PM til 13.41 PM    EKG 2/22/23: NSR 72 bpm, PVC, Qtc 435 ms Please see scan in Cardiology. Echocardiogram 2/22/23:    Summary   Left ventricular internal dimensions were normal in diastole and systole. Mild asymmetric septal hypertrophy. No regional wall motion abnormalities seen. Normal left ventricular ejection fraction. Structurally normal mitral valve. Mild mitral annular calcification. The aortic valve appears mildly sclerotic. The tricuspid valve appears structurally normal.   Mild tricuspid regurgitation. Signature      ----------------------------------------------------------------   Electronically signed by J Carlos Conway MD(Interpreting   physician) on 02/22/2023 03:32 PM   ----------------------------------------------------------------    Cardiac Catheterization 12/28/12:       Stress Test 2/21/23:   FINDINGS: The overall quality of the study was adequate. Low dose CT   images showed coronary artery calcification mostly in the left   coronary system. Left ventricular cavity size was noted to be normal.       Rotational analog analysis demonstrated no signal motion artifacts. TID 1.17. The gated SPECT stress imaging in the short, vertical long, and   horizontal long axis demonstrated a mild defect was present in the   anteroapical wall that was small sized by quantification. The resting images reversibility with include perfusion in the   anteroapical defect        Gated SPECT left ventricular ejection fraction was calculated to be   76%, with normal Myocardial wall motion. Impression       The myocardial perfusion imaging was abnormal.       The abnormality was a small area of anteroapical reversible defect. Left ventricular systolic function was normal, EF calculated at 76%. Coronary artery calcifications noted on low dose CT images. Overall low to intermediate risk myocardial perfusion study.        Compared to previous study from August 2015 which showed no ischemia,   EF 78%. I have independently reviewed all of the ECGs and rhythm strips per above     Assessment/Plan: This is a 69 y.o. female with a history of   Patient Active Problem List   Diagnosis    CAD (coronary artery disease)    HTN (hypertension)    Dyslipidemia    Type 2 diabetes mellitus with diabetic chronic kidney disease (HCC)    GERD (gastroesophageal reflux disease)    FH: CAD (coronary artery disease)    Displacement of intervertebral disc    Lumbosacral radiculopathy    Spondylosis of lumbosacral region    Neurologic gait dysfunction    CKD stage 3 due to type 2 diabetes mellitus (HCC)    Neuropathy due to type 2 diabetes mellitus (HCC)    Vitamin D deficiency    HNP (herniated nucleus pulposus) with myelopathy, cervical    Herniation of nucleus pulposus    Chest heaviness    Headache    Acute respiratory failure due to COVID-19 ([address]    COVID-19    Postoperative state    Endometrial cancer (HCC)    Intestinal adhesions    Acute chest pain    Atrial tachycardia (HCC)    SVT (supraventricular tachycardia) ([address]    who presents with PAT. 1. Supraventricular tachycardia  - Narrow complex with short RP.  - Likely paroxysmal atrial tachycardia. - Symptomatic.  - On Toprol XL, increase to twice a day    2. Coronary artery disease  - C 12/28/12 showed 70% stenosis of mid RCA.  - Nuclear stress test 2/22/23 showed small anteroapical ischemia. - Plan for [facility] today or tomorrow per Cardiology. 3. Hypertension  - On Cozaar and Toprol XL. 4. Hyperlipidemia  - On Lipitor. 5. Diabetes mellitus, type II  - On Insulin. 6. CKD  Estimated Creatinine Clearance: 52 mL/min (A) (based on SCr of 1.1 mg/dL (H)). -Baseline creatinine of 1.2    Recommendations:  Continue Toprol XL to 50 mg BID. Await for [facility] results. Consider AAD therapy if fails Beta-blocker therapy. Keep potassium and magnesium at high normal levels. Thank you for allowing me to participate in your patient's care.   Please call me if there are any questions or concerns. Arnold Jackson, SURESH - CNP  Cardiac Electrophysiology  [facility]) Physicians  [facility] [place]  10:27 AM  2/23/2023    Attending [de-identified] Statement    Patient seen with the ANP. Agree with the findings, assessment and plan. Management plan was discussed. I have personally interviewed the patient, independently performed a focused cardiac examination, reviewed the pertinent laboratory and diagnostic testing with the patient and directly participated in the medical decision-making as noted above with the following additions:     Feeling well. No recurrent PAT overnight. Underwent LHC which showed 60-70% stenosis of proximal RCA. Physical exam showed no JVD, RRR, normal S1S2, no murmur, clear lungs bilaterally, no edema    Continue Toprol XL 50 mg BID. Consider AAD if fails beta-blocker therapy. Will schedule 14 day cardiac monitor at discharge. I have spent a total of 50 minutes with the patient and the family reviewing the above stated recommendations. And a total of >50% of that time involved face-to-face time providing counseling and/or coordination of care with the other providers, reviewing records/tests, counseling/education of the patient, ordering medications/tests/procedures, coordinating care, and documenting clinical information in the EHR.      Ann-Marie Evans MD  Cardiac Electrophysiology  [address]  [facility] [place]  10:19 PM  2/23/2023

## 2023-02-23 NOTE — DISCHARGE SUMMARY
Physician Discharge Summary     Patient ID:  Noemi Gracia  [id number]  1 y.o.  1955    Admit date: 2/21/2023    Discharge date and time:  2/23/2023    Discharge Diagnoses: Principal Problem:    Acute chest pain  Active Problems:    Atrial tachycardia (HCC)    SVT (supraventricular tachycardia) ([address]  Resolved Problems:    * No resolved hospital problems.  *      Consults: IP CONSULT TO INTERNAL MEDICINE  IP CONSULT TO CARDIOLOGY  IP CONSULT TO ELECTROPHYSIOLOGY    Procedures: See below    Hospital Course: ***    Discharge Exam:  See progress note from today    Condition:  Stable    Disposition: {disposition:22953}    Patient Instructions:   Current Discharge Medication List        CONTINUE these medications which have CHANGED    Details   metoprolol succinate (TOPROL XL) 50 MG extended release tablet Take 1 tablet by mouth in the morning and at bedtime  Qty: 60 tablet, Refills: 3           CONTINUE these medications which have NOT CHANGED    Details   traZODone (DESYREL) 150 MG tablet       acetaminophen (TYLENOL) 500 MG tablet Take 1 tablet by mouth 4 times daily as needed for Pain  Qty: 120 tablet, Refills: 1      vitamin B-12 (CYANOCOBALAMIN) 500 MCG tablet Take 500 mcg by mouth daily      ascorbic acid (VITAMIN C) 500 MG tablet Take 1,000 mg by mouth daily      zinc gluconate 50 MG tablet Take 50 mg by mouth daily      Oxygen Concentrator 2 L/min as needed      NITROSTAT 0.4 MG SL tablet Place 1 tablet under the tongue every 5 minutes as needed for Chest pain  Qty: 25 tablet, Refills: 1      omeprazole (PRILOSEC) 20 MG delayed release capsule Take 2 capsules by mouth Daily  Qty: 30 capsule, Refills: 11      budesonide-formoterol (SYMBICORT) 80-4.5 MCG/ACT AERO Inhale 1 puff into the lungs 2 times daily  Qty: 10.2 g, Refills: 3      atorvastatin (LIPITOR) 80 MG tablet Take 80 mg by mouth daily      Insulin Degludec (TRESIBA FLEXTOUCH) 200 UNIT/ML SOPN Inject 44 Units into the skin nightly      albuterol sulfate HFA (PROVENTIL;VENTOLIN;PROAIR) 108 (90 Base) MCG/ACT inhaler Inhale 2 puffs into the lungs every 6 hours as needed for Wheezing or Shortness of Breath      famotidine (PEPCID) 20 MG tablet Take 20 mg by mouth daily       escitalopram (LEXAPRO) 10 MG tablet Take 10 mg by mouth daily      empagliflozin (JARDIANCE) 10 MG tablet Take 10 mg by mouth daily      insulin lispro (HUMALOG) 100 UNIT/ML injection vial Inject 7 Units into the skin 2 times daily       Cholecalciferol (VITAMIN D3) 2000 UNITS CAPS Take 2,000 Units by mouth daily      Multiple Vitamins-Minerals (MULTIVITAMIN ADULT PO) Take 40 mg by mouth daily       aspirin EC 81 MG EC tablet Take 1 tablet by mouth daily  Qty: 30 tablet, Refills: 3    Associated Diagnoses: Type 2 diabetes mellitus with diabetic polyneuropathy ([facility]  Coronary artery disease involving native coronary artery of native heart without angina pectoris           STOP taking these medications       estradiol (ESTRACE VAGINAL) 0.1 MG/GM vaginal cream Comments:   Reason for Stopping:         phenazopyridine (PYRIDIUM) 100 MG tablet Comments:   Reason for Stopping:         losartan (COZAAR) 50 MG tablet Comments:   Reason for Stopping:         ALPRAZolam (XANAX) 0.25 MG tablet Comments:   Reason for Stopping:             Activity: {discharge activity:31113}  Diet: {diet:48403}    Follow-up with ***    Note that over 30 minutes was spent in preparing discharge papers, discussing discharge with patient, staff, consultants, medication review, arranging follow up, etc.    Signed:  Ishaan Anaya MD  2/23/2023  2:53 PM

## 2023-02-23 NOTE — PROGRESS NOTES
Physician Progress Note      PATIENTDajosie VILLEDA #:                  [id number]  :                       1955  ADMIT DATE:       2023 11:48 AM  [address] DATE:  RESPONDING  PROVIDER #:        Anand Hurt MD          QUERY TEXT:    Dear Dr. Venkatesh Mccarty,    Pt admitted with Chest pain. If possible, please document in progress notes   and discharge summary if you are evaluating and/or treating any of the   following: The medical record reflects the following:  Risk Factors: CAD and GERD by history  Clinical Indicators: ED provider states: \". Shabazz Potters Shabazz Potters Peripheral diagnosis: STEMI,   acute chest pain, PE, other. Shabazz Potters Shabazz Potters \", Troponin on admission 23 trend down to 8,   Cardiology consult note: \". Shabazz Potters Shabazz Potters Atypical chest discomfort. Troponin not   consistent with an ACS. Nonischemic coronary artery disease. Reported 70% RCA   stenosis in  with negative by FFR for significance. Stress test in    showed no ischemia. Shabazz Potters Shabazz Potters \", Cardiac testing result: \"The myocardial perfusion   imaging was abnormal, The abnormality was a small area of anteroapical   reversible defect. Shabazz Potters Shabazz Potters Coronary artery calcifications noted on l  Treatment: Close pt monitoring, Serial labs, Cardiology and EP consults    Thank you,  Robert FINCH, RN  Clinical Documentation Improvement  [email or web address]  Options provided:  -- Chest pain due to CAD with unstable angina  -- Chest pain due to GERD  -- Chest pain due to STEMI  -- Chest pain due to Please provide cause, Please provide cause. -- Other - I will add my own diagnosis  -- Disagree - Not applicable / Not valid  -- Disagree - Clinically unable to determine / Unknown  -- Refer to Clinical Documentation Reviewer    PROVIDER RESPONSE TEXT:    Provider is clinically unable to determine a response to this query.     Query created by: Carolyne Hung on 2023 11:19 AM      Electronically signed by:  Anand Hurt MD 2023 3:18 PM

## 2023-02-23 NOTE — DISCHARGE INSTRUCTIONS
Cardiac Electrophysiology discharge instructions: You have been seen by cardiac electrophysiology for fast heart beats. You have been seen by Electrophysiologist: Dr. Prince Lal and SURESH Caballero CNP    Medication changes made from our service were increase toprol 50 mg BID   We have ordered you a cardiac monitor to wear for 2 weeks and we will see you in the office to review these results. You should call and make a follow-up appointment for 1-2 months  in the office on the second floor of the electrophysiology office on [facility]   [facility]. Please call [phone] for any changes in this appointment.        IMPORTANT PHONE NUMBERS:  [place] [facility]:    - Phone # [phone]

## 2023-02-24 ENCOUNTER — TELEPHONE (OUTPATIENT)
Dept: NON INVASIVE DIAGNOSTICS | Age: 68
End: 2023-02-24

## 2023-02-24 NOTE — TELEPHONE ENCOUNTER
----- Message from SURESH Liang CNP sent at 2/23/2023  4:00 PM EST -----  Can you make follow up with me or CK in 4-6 weeks   Thanks   SURESH Liang CNP

## 2023-03-20 DIAGNOSIS — I47.1 SVT (SUPRAVENTRICULAR TACHYCARDIA) (HCC): ICD-10-CM

## 2023-03-30 ENCOUNTER — OFFICE VISIT (OUTPATIENT)
Dept: NON INVASIVE DIAGNOSTICS | Age: 68
End: 2023-03-30
Payer: MEDICARE

## 2023-03-30 VITALS
RESPIRATION RATE: 18 BRPM | HEART RATE: 68 BPM | WEIGHT: 211 LBS | DIASTOLIC BLOOD PRESSURE: 72 MMHG | HEIGHT: 62 IN | SYSTOLIC BLOOD PRESSURE: 112 MMHG | BODY MASS INDEX: 38.83 KG/M2

## 2023-03-30 DIAGNOSIS — I10 PRIMARY HYPERTENSION: ICD-10-CM

## 2023-03-30 DIAGNOSIS — I25.10 CORONARY ARTERY DISEASE INVOLVING NATIVE CORONARY ARTERY OF NATIVE HEART WITHOUT ANGINA PECTORIS: ICD-10-CM

## 2023-03-30 DIAGNOSIS — R00.2 PALPITATIONS: ICD-10-CM

## 2023-03-30 DIAGNOSIS — Z99.81 CHRONIC RESPIRATORY FAILURE WITH HYPOXIA, ON HOME O2 THERAPY (HCC): ICD-10-CM

## 2023-03-30 DIAGNOSIS — J96.11 CHRONIC RESPIRATORY FAILURE WITH HYPOXIA, ON HOME O2 THERAPY (HCC): ICD-10-CM

## 2023-03-30 DIAGNOSIS — I47.1 ATRIAL TACHYCARDIA (HCC): Primary | ICD-10-CM

## 2023-03-30 PROCEDURE — 99214 OFFICE O/P EST MOD 30 MIN: CPT | Performed by: NURSE PRACTITIONER

## 2023-03-30 PROCEDURE — G8484 FLU IMMUNIZE NO ADMIN: HCPCS | Performed by: NURSE PRACTITIONER

## 2023-03-30 PROCEDURE — 3078F DIAST BP <80 MM HG: CPT | Performed by: NURSE PRACTITIONER

## 2023-03-30 PROCEDURE — 1123F ACP DISCUSS/DSCN MKR DOCD: CPT | Performed by: NURSE PRACTITIONER

## 2023-03-30 PROCEDURE — G8417 CALC BMI ABV UP PARAM F/U: HCPCS | Performed by: NURSE PRACTITIONER

## 2023-03-30 PROCEDURE — 3074F SYST BP LT 130 MM HG: CPT | Performed by: NURSE PRACTITIONER

## 2023-03-30 PROCEDURE — 3017F COLORECTAL CA SCREEN DOC REV: CPT | Performed by: NURSE PRACTITIONER

## 2023-03-30 PROCEDURE — 1036F TOBACCO NON-USER: CPT | Performed by: NURSE PRACTITIONER

## 2023-03-30 PROCEDURE — G8427 DOCREV CUR MEDS BY ELIG CLIN: HCPCS | Performed by: NURSE PRACTITIONER

## 2023-03-30 PROCEDURE — G8400 PT W/DXA NO RESULTS DOC: HCPCS | Performed by: NURSE PRACTITIONER

## 2023-03-30 PROCEDURE — 1090F PRES/ABSN URINE INCON ASSESS: CPT | Performed by: NURSE PRACTITIONER

## 2023-03-30 RX ORDER — SEMAGLUTIDE 1.34 MG/ML
INJECTION, SOLUTION SUBCUTANEOUS WEEKLY
COMMUNITY
Start: 2023-02-24

## 2023-03-30 NOTE — PROGRESS NOTES
tachycardia (HCC)    SVT (supraventricular tachycardia) ([facility])       Current Outpatient Medications   Medication Sig Dispense Refill    Semaglutide,0.25 or 0.5MG/DOS, (OZEMPIC, 0.25 OR 0.5 MG/DOSE,) 2 MG/1.5ML SOPN Inject into the skin once a week      metoprolol succinate (TOPROL XL) 50 MG extended release tablet Take 1 tablet by mouth in the morning and at bedtime 60 tablet 3    traZODone (DESYREL) 150 MG tablet       acetaminophen (TYLENOL) 500 MG tablet Take 1 tablet by mouth 4 times daily as needed for Pain 120 tablet 1    vitamin B-12 (CYANOCOBALAMIN) 500 MCG tablet Take 500 mcg by mouth daily      ascorbic acid (VITAMIN C) 500 MG tablet Take 1,000 mg by mouth daily      zinc gluconate 50 MG tablet Take 50 mg by mouth daily      Oxygen Concentrator 2 L/min as needed      NITROSTAT 0.4 MG SL tablet Place 1 tablet under the tongue every 5 minutes as needed for Chest pain 25 tablet 1    omeprazole (PRILOSEC) 20 MG delayed release capsule Take 2 capsules by mouth Daily (Patient taking differently: Take 20 mg by mouth Daily) 30 capsule 11    atorvastatin (LIPITOR) 80 MG tablet Take 80 mg by mouth daily      Insulin Degludec (TRESIBA FLEXTOUCH) 200 UNIT/ML SOPN Inject 44 Units into the skin nightly      albuterol sulfate HFA (PROVENTIL;VENTOLIN;PROAIR) 108 (90 Base) MCG/ACT inhaler Inhale 2 puffs into the lungs every 6 hours as needed for Wheezing or Shortness of Breath      famotidine (PEPCID) 20 MG tablet Take 20 mg by mouth daily       escitalopram (LEXAPRO) 10 MG tablet Take 10 mg by mouth daily      empagliflozin (JARDIANCE) 10 MG tablet Take 10 mg by mouth daily      insulin lispro (HUMALOG) 100 UNIT/ML injection vial Inject 14 Units into the skin 3 times daily (before meals)      Cholecalciferol (VITAMIN D3) 2000 UNITS CAPS Take 2,000 Units by mouth daily      Multiple Vitamins-Minerals (MULTIVITAMIN ADULT PO) Take 40 mg by mouth daily       aspirin EC 81 MG EC tablet Take 1 tablet by mouth daily 30 tablet 3

## 2023-05-08 PROBLEM — K66.0 INTESTINAL ADHESIONS: Status: ACTIVE | Noted: 2021-10-16

## 2023-05-08 PROBLEM — E66.01 MORBID OBESITY (HCC): Status: ACTIVE | Noted: 2020-10-07

## 2023-05-08 PROBLEM — N95.0 POSTMENOPAUSAL BLEEDING: Status: ACTIVE | Noted: 2021-07-20

## 2023-05-08 PROBLEM — J34.89 POSTERIOR RHINORRHEA: Status: ACTIVE | Noted: 2022-10-25

## 2023-05-08 PROBLEM — F41.1 GENERALIZED ANXIETY DISORDER: Status: ACTIVE | Noted: 2019-10-14

## 2023-05-08 PROBLEM — F32.2 MODERATELY SEVERE MAJOR DEPRESSION (HCC): Status: ACTIVE | Noted: 2020-10-07

## 2023-05-08 PROBLEM — F33.42 RECURRENT MAJOR DEPRESSION IN FULL REMISSION (HCC): Status: ACTIVE | Noted: 2023-03-28

## 2023-05-08 PROBLEM — N81.11 CYSTOCELE, MIDLINE: Status: ACTIVE | Noted: 2022-07-18

## 2023-05-08 PROBLEM — I20.0 PROGRESSIVE ANGINA (HCC): Status: ACTIVE | Noted: 2023-02-24

## 2023-05-08 PROBLEM — D12.6 BENIGN NEOPLASM OF COLON: Status: ACTIVE | Noted: 2020-10-07

## 2023-05-08 PROBLEM — G45.4 TRANSIENT GLOBAL AMNESIA: Status: ACTIVE | Noted: 2019-10-14

## 2023-05-08 PROBLEM — M54.17 THORACIC AND LUMBOSACRAL NEURITIS: Status: ACTIVE | Noted: 2019-10-14

## 2023-05-08 PROBLEM — M54.14 THORACIC AND LUMBOSACRAL NEURITIS: Status: ACTIVE | Noted: 2019-10-14

## 2023-05-08 PROBLEM — C54.9 MALIGNANT NEOPLASM OF CORPUS UTERI, EXCEPT ISTHMUS (HCC): Status: ACTIVE | Noted: 2021-10-16

## 2023-05-23 ENCOUNTER — HOSPITAL ENCOUNTER (OUTPATIENT)
Dept: PULMONOLOGY | Age: 68
Discharge: HOME | End: 2023-05-23
Payer: MEDICARE

## 2023-05-23 DIAGNOSIS — J84.9 ILD (INTERSTITIAL LUNG DISEASE) (HCC): ICD-10-CM

## 2023-05-23 DIAGNOSIS — R06.00 DYSPNEA, UNSPECIFIED TYPE: ICD-10-CM

## 2023-05-23 PROCEDURE — 94726 PLETHYSMOGRAPHY LUNG VOLUMES: CPT

## 2023-05-23 PROCEDURE — 94729 DIFFUSING CAPACITY: CPT

## 2023-05-23 PROCEDURE — 94060 EVALUATION OF WHEEZING: CPT

## 2023-05-26 ENCOUNTER — HOSPITAL ENCOUNTER (OUTPATIENT)
Dept: CT IMAGING | Age: 68
End: 2023-05-26
Payer: MEDICARE

## 2023-05-26 DIAGNOSIS — J84.9 ILD (INTERSTITIAL LUNG DISEASE) (HCC): ICD-10-CM

## 2023-05-26 DIAGNOSIS — J47.9 BRONCHIECTASIS WITHOUT COMPLICATION (HCC): ICD-10-CM

## 2023-05-26 PROCEDURE — 71250 CT THORAX DX C-: CPT

## 2023-05-27 NOTE — PROCEDURES
[address] [facility]  [facility]                               PULMONARY FUNCTION    PATIENT NAME: Tristan Riding                       :        1955  MED REC NO:   [id number]                            ROOM:  ACCOUNT NO:   [de-identified]                           ADMIT DATE: 2023  PROVIDER:     Trey Lopez DO    DATE OF PROCEDURE:  2023    Pulmonary function tests completed with good effort. SPIROMETRY:  Flow rates are within normal limits. Forced vital capacity  2.32 liters, which is 87%. FEV1 is 1.94 liters, which is 93%. FEV1/FVC  ratio is 83%. CWD01-33% is 111%. MVV is 59.03 liters per minute, which  is 72%. LUNG VOLUMES:  Total lung capacity is 3.56, which is 74%, it is mildly  decreased. Residual volume is 1.29 liters, which is 62%. RV/TLC ratio  is 36%. DIFFUSION:  DLCO corrected for hemoglobin is 12.33 mL/minute per mmHg,  which is borderline low. When corrected for alveolar ventilation, it is  normal at 95%. PFT INTERPRETATION:  1. Normal spirometry. 2.  Mild decrease in total lung capacity indicating restriction. 3.  Borderline low diffusion, however, normal when corrected for  alveolar ventilation. CONCLUSION:  PFT shows normal spirometry with mild decrease in total  lung capacity, indicating restriction. There is a borderline low  diffusion, however is normal when corrected for alveolar ventilation. Restriction may be from body habitus or early ILD. Clinical correlation advised.         Charo Costa DO    D: 2023 15:28:37       T: 2023 15:30:58     [email or web address]  Job#: [id number]     Doc#: [id number]    CC:

## 2023-09-15 ENCOUNTER — TRANSCRIBE ORDERS (OUTPATIENT)
Dept: ADMINISTRATIVE | Age: 68
End: 2023-09-15

## 2023-09-15 DIAGNOSIS — S83.219S: Primary | ICD-10-CM

## 2023-09-22 ENCOUNTER — HOSPITAL ENCOUNTER (OUTPATIENT)
Dept: MRI IMAGING | Age: 68
End: 2023-09-22
Payer: MEDICARE

## 2023-09-22 DIAGNOSIS — S83.219S: ICD-10-CM

## 2023-09-22 PROCEDURE — 73721 MRI JNT OF LWR EXTRE W/O DYE: CPT

## 2023-11-18 ENCOUNTER — HOSPITAL ENCOUNTER (OUTPATIENT)
Dept: MRI IMAGING | Age: 68
End: 2023-11-18
Payer: MEDICARE

## 2023-11-18 DIAGNOSIS — M51.36 DDD (DEGENERATIVE DISC DISEASE), LUMBAR: ICD-10-CM

## 2023-11-18 DIAGNOSIS — M54.16 RADICULOPATHY, LUMBAR REGION: ICD-10-CM

## 2023-11-18 PROCEDURE — 72148 MRI LUMBAR SPINE W/O DYE: CPT

## 2024-03-13 ENCOUNTER — OFFICE VISIT (OUTPATIENT)
Dept: NON INVASIVE DIAGNOSTICS | Age: 69
End: 2024-03-13
Payer: MEDICARE

## 2024-03-13 VITALS
SYSTOLIC BLOOD PRESSURE: 108 MMHG | RESPIRATION RATE: 16 BRPM | OXYGEN SATURATION: 96 % | DIASTOLIC BLOOD PRESSURE: 60 MMHG | HEIGHT: 62 IN | BODY MASS INDEX: 37.54 KG/M2 | HEART RATE: 53 BPM | WEIGHT: 204 LBS

## 2024-03-13 DIAGNOSIS — I47.19 ATRIAL TACHYCARDIA: Primary | ICD-10-CM

## 2024-03-13 PROCEDURE — G8417 CALC BMI ABV UP PARAM F/U: HCPCS

## 2024-03-13 PROCEDURE — 3078F DIAST BP <80 MM HG: CPT

## 2024-03-13 PROCEDURE — G8427 DOCREV CUR MEDS BY ELIG CLIN: HCPCS

## 2024-03-13 PROCEDURE — 1036F TOBACCO NON-USER: CPT

## 2024-03-13 PROCEDURE — 3074F SYST BP LT 130 MM HG: CPT

## 2024-03-13 PROCEDURE — G8484 FLU IMMUNIZE NO ADMIN: HCPCS

## 2024-03-13 PROCEDURE — G8400 PT W/DXA NO RESULTS DOC: HCPCS

## 2024-03-13 PROCEDURE — 1090F PRES/ABSN URINE INCON ASSESS: CPT

## 2024-03-13 PROCEDURE — 1123F ACP DISCUSS/DSCN MKR DOCD: CPT

## 2024-03-13 PROCEDURE — 3017F COLORECTAL CA SCREEN DOC REV: CPT

## 2024-03-13 PROCEDURE — 99214 OFFICE O/P EST MOD 30 MIN: CPT

## 2024-03-13 PROCEDURE — 93000 ELECTROCARDIOGRAM COMPLETE: CPT | Performed by: INTERNAL MEDICINE

## 2024-03-13 ASSESSMENT — ENCOUNTER SYMPTOMS
RESPIRATORY NEGATIVE: 1
GASTROINTESTINAL NEGATIVE: 1

## 2024-03-13 NOTE — PROGRESS NOTES
[facility]  Outpatient Progress Note  Álvaro Cervantes  1955  Date of Service: 3/13/2024  PCP: Yosi Wadsworth MD  Cardiologist: none  Electrophysiologist: Dr. Keller    Subjective:      Patient ID: Álvaro Cervantes is a 68 y.o. female.    HPI: Presents today for routine follow-up management of SVT/AT, past medical history significant for CAD, hypertension, hyperlipidemia, diabetes, CKD.    had chest pain and elevated heart rate up to 140 bpm on 2/21/23 at 10.30 AM and presented to the ED.  Tele showed PATs at 140 bpm for 7 minutes. She underwent LHC 2/21 with RCA negative IFR testing. She wore a 2 week monitor showed SR with 44 SVT episodes, longest of 13.7 seconds.  She was noted to have hypoxia at home at 84% with heart pounding and was treated with oxygen therapy by primary care physician.    Last seen in the electrophysiology office by Kaylee Nava CNP 3/30/2023    Over the past year she is doing well.  The past 6 months she reports fast heart rate episodes x 2 lasting approximately 10 minutes which she took a nitro symptoms subsided.  She remains active.  She continues to care for her grandson who is autistic.  She denies chest pain, shortness of breath, orthopnea, PND, syncope, presyncope.  When her heart rates were elevated she did not have associated chest pain.  Her symptoms occurred while at rest.    Review of Systems   Constitutional: Negative.    Respiratory: Negative.     Cardiovascular: Negative.    Gastrointestinal: Negative.    Genitourinary: Negative.    Neurological: Negative.    All other systems reviewed and are negative.      Objective:   Physical Exam  Vitals and nursing note reviewed.   Constitutional:       General: She is not in acute distress.     Appearance: Normal appearance. She is not toxic-appearing.   Neck:      Vascular: No carotid bruit or JVD.   Cardiovascular:      Rate and Rhythm: Normal rate and regular

## 2024-12-18 ENCOUNTER — TRANSCRIBE ORDERS (OUTPATIENT)
Dept: ADMINISTRATIVE | Age: 69
End: 2024-12-18

## 2024-12-18 DIAGNOSIS — C54.1 RECURRENT CARCINOMA OF ENDOMETRIUM (HCC): Primary | ICD-10-CM

## 2024-12-18 DIAGNOSIS — C79.82 METASTASIS OF MALIGNANT NEOPLASM TO VAGINA (HCC): ICD-10-CM

## 2024-12-26 ENCOUNTER — HOSPITAL ENCOUNTER (OUTPATIENT)
Dept: PET IMAGING | Age: 69
Discharge: HOME | End: 2024-12-28
Payer: MEDICARE

## 2024-12-26 DIAGNOSIS — C79.82 METASTASIS OF MALIGNANT NEOPLASM TO VAGINA (HCC): ICD-10-CM

## 2024-12-26 DIAGNOSIS — C54.1 RECURRENT CARCINOMA OF ENDOMETRIUM (HCC): ICD-10-CM

## 2024-12-26 LAB — GLUCOSE BLD-MCNC: 130 MG/DL (ref 74–99)

## 2024-12-26 PROCEDURE — 3430000000 HC RX DIAGNOSTIC RADIOPHARMACEUTICAL: Performed by: RADIOLOGY

## 2024-12-26 PROCEDURE — 82947 ASSAY GLUCOSE BLOOD QUANT: CPT

## 2024-12-26 PROCEDURE — 78815 PET IMAGE W/CT SKULL-THIGH: CPT

## 2024-12-26 PROCEDURE — A9609 HC RX DIAGNOSTIC RADIOPHARMACEUTICAL: HCPCS | Performed by: RADIOLOGY

## 2024-12-26 RX ORDER — FLUDEOXYGLUCOSE F 18 200 MCI/ML
15 INJECTION, SOLUTION INTRAVENOUS
Status: COMPLETED | OUTPATIENT
Start: 2024-12-26 | End: 2024-12-26

## 2024-12-26 RX ADMIN — FLUDEOXYGLUCOSE F 18 15 MILLICURIE: 200 INJECTION, SOLUTION INTRAVENOUS at 11:54

## 2025-01-14 ENCOUNTER — TRANSCRIBE ORDERS (OUTPATIENT)
Dept: ADMINISTRATIVE | Age: 70
End: 2025-01-14

## 2025-01-14 DIAGNOSIS — C79.82 METASTASIS OF MALIGNANT NEOPLASM TO VAGINA (HCC): Primary | ICD-10-CM

## 2025-01-20 ENCOUNTER — HOSPITAL ENCOUNTER (OUTPATIENT)
Dept: RADIATION ONCOLOGY | Age: 70
Discharge: HOME | End: 2025-01-20
Payer: MEDICARE

## 2025-01-20 ENCOUNTER — CLINICAL DOCUMENTATION (OUTPATIENT)
Dept: ONCOLOGY | Age: 70
End: 2025-01-20

## 2025-01-20 VITALS
DIASTOLIC BLOOD PRESSURE: 65 MMHG | TEMPERATURE: 98.3 F | BODY MASS INDEX: 32.3 KG/M2 | WEIGHT: 176.6 LBS | HEART RATE: 76 BPM | SYSTOLIC BLOOD PRESSURE: 112 MMHG | RESPIRATION RATE: 18 BRPM

## 2025-01-20 DIAGNOSIS — C54.9 MALIGNANT NEOPLASM OF CORPUS UTERI, EXCEPT ISTHMUS (HCC): Primary | ICD-10-CM

## 2025-01-20 PROCEDURE — 99205 OFFICE O/P NEW HI 60 MIN: CPT

## 2025-01-20 PROCEDURE — 99205 OFFICE O/P NEW HI 60 MIN: CPT | Performed by: RADIOLOGY

## 2025-01-20 RX ORDER — HYDROCODONE BITARTRATE AND ACETAMINOPHEN 7.5; 325 MG/1; MG/1
1 TABLET ORAL EVERY 6 HOURS PRN
COMMUNITY

## 2025-01-20 RX ORDER — SENNA AND DOCUSATE SODIUM 50; 8.6 MG/1; MG/1
1 TABLET, FILM COATED ORAL DAILY
COMMUNITY

## 2025-01-20 ASSESSMENT — PAIN DESCRIPTION - PAIN TYPE: TYPE: CHRONIC PAIN

## 2025-01-20 ASSESSMENT — PAIN DESCRIPTION - DESCRIPTORS: DESCRIPTORS: OTHER (COMMENT)

## 2025-01-20 ASSESSMENT — PAIN DESCRIPTION - ORIENTATION: ORIENTATION: MID;LOWER

## 2025-01-20 ASSESSMENT — PAIN DESCRIPTION - LOCATION: LOCATION: BACK

## 2025-01-20 ASSESSMENT — PAIN SCALES - GENERAL: PAINLEVEL_OUTOF10: 8

## 2025-01-20 NOTE — PROGRESS NOTES
Radiation Oncology Consult Note         1/20/2025    Álvaro Cervantes  69 y.o.   1955    REFERRING PROVIDER: Jack    PCP:  Yosi Wadsworth MD    CHIEF COMPLAINT: My endometrial cancer came back    DIAGNOSIS: Distal vaginal recurrence from  endometrioid adenocarcinoma of the endometrium, SP LILLI/BSO in 2021    STAGING:  Cancer Staging   Malignant neoplasm of corpus uteri, except isthmus (HCC)  Staging form: Corpus Uteri - Carcinoma And Carcinosarcoma, AJCC 8th Edition  - Clinical stage from 10/8/2021: FIGO Stage IIIB (rcT3b, cN0, cM0) - Signed by Marisa Melgar MD on 1/20/2025      HISTORY OF PRESENT ILLNESS: Ms. Álvaro Cervantes  is a 69 y.o. year old female who was seen back in September 2021 by Dr. Herrera for post menopausal bleeding.  He did a D&C on 9/30/2021 that showed endometrial cancer with clear cell features, grade 3.  Immunohistochemical stains were positive for estrogen and progesterone receptors and positive for p53 and p16.  In October 2021 she was seen by Dr. aGrcia who performed LILLI/BSO and lymph node sampling with omentectomy.  Pathology report showed endometrioid adenocarcinoma, FIGO grade 2, greatest dimension 0.6 cm, myometrial invasion present, 6 out of 18 mm equals 33%.  2 negative sentinel nodes.  MMR staining were done although I do not know the results.  Lymphovascular invasion not identified.  The patient was staged as a stage IA and therefore no adjuvant treatment was recommended.  The patient was then lost to follow-up.  She presented 2 months ago with new onset of vaginal bleeding and back pain, the back pain has been present over the last 3 years at least.  Intercurrently she was seen by Dr. Brooks for cysto- sling, apparently he did a biopsy that was positive for recurrent endometrial cancer.  She was then seen by Dr. Howard who on examination found,upon  the labia minora, a violaceous mass appeared visible at the introitus at about 11:00 below the urethral

## 2025-01-20 NOTE — PROGRESS NOTES
Álvaro Cervantes  1955 69 y.o.      Referring Physician: Dr Howard    PCP: Yosi Wadsworth MD     Vitals:    25 [id number]   BP: 112/65   Pulse: 76   Resp: 18   Temp: 98.3 °F (36.8 °C)        Wt Readings from Last 3 Encounters:   25 80.1 kg (176 lb 9.6 oz)   24 92.5 kg (204 lb)   23 102.3 kg (225 lb 9.6 oz)        Body mass index is 32.3 kg/m².               Chief Complaint: No chief complaint on file.         Cancer Staging   No matching staging information was found for the patient.      Prior Radiation Therapy? NO    Concurrent Chemo/radiation? NO    Prior Chemotherapy? NO    Prior Hormonal Therapy? NO    Head and Neck Cancer? No, patient does NOT have HN cancer.      LMP: 40's    Age at first Menses: 13    : 2, first live birth at age 19    Para: 2        Current Outpatient Medications   Medication Sig Dispense Refill    levalbuterol (XOPENEX HFA) 45 MCG/ACT inhaler Inhale 1 puff into the lungs every 4 hours as needed for Wheezing 15 g 3    ONETOUCH ULTRA strip       GLOBAL EASE INJECT PEN NEEDLES 31G X 5 MM MISC       lidocaine-prilocaine (EMLA) 2.5-2.5 % cream       tiZANidine (ZANAFLEX) 2 MG tablet       losartan (COZAAR) 50 MG tablet       omeprazole (PRILOSEC) 40 MG delayed release capsule       Saline (SHARON NASAL MIST ALLERGY/SINUS NA) by Nasal route      Semaglutide,0.25 or 0.5MG/DOS, (OZEMPIC, 0.25 OR 0.5 MG/DOSE,) 2 MG/1.5ML SOPN Inject into the skin once a week States stopped taking this since last month      metoprolol succinate (TOPROL XL) 50 MG extended release tablet Take 1 tablet by mouth in the morning and at bedtime 60 tablet 3    traZODone (DESYREL) 150 MG tablet       acetaminophen (TYLENOL) 500 MG tablet Take 1 tablet by mouth 4 times daily as needed for Pain 120 tablet 1    vitamin B-12 (CYANOCOBALAMIN) 500 MCG tablet Take 1 tablet by mouth daily      ascorbic acid (VITAMIN C) 500 MG tablet Take 2 tablets by mouth daily      zinc gluconate 50 MG tablet Take 1 tablet

## 2025-01-20 NOTE — PROGRESS NOTES
Álvaro Cervantes  1/20/2025  Ht Readings from Last 1 Encounters:   03/13/24 1.575 m (5' 2\")     Wt Readings from Last 2 Encounters:   01/20/25 80.1 kg (176 lb 9.6 oz)   03/13/24 92.5 kg (204 lb)     BMI: 32.30    Assessment: Met with Álvaro and son during radiation oncology consult with Dr. Melgar. Patient here to discuss RT for endometrial cancer. Introduced self and explained role of oncology dietitian with patients receiving treatment at our center. Patient reports a weight loss of about 30 pounds over the past several months. Says that she was on Ozempic for 3-4 months and it caused her appetite to be decreased. She went off of Ozempic a couple weeks ago and has noticed a return in her appetite. Discussed the importance of adequate nutrition during treatment. Encouraged patient to eat several small meals throughout the day if her appetite is decreased. Provided written and verbal education on nutrient dense foods choices to include in diet. Recommended obtaining a source of protein at each meal/snack. Patient expressed appreciation over dietary advice. Provided patient with my contact info for further concerns. Will follow prn.   Weight change: 28# loss x 10 months (pt was on Ozempic)  Appetite: Fair, improving since being off Ozempic  Nutritional Side Effects: weight loss    Recommendations: Patient to consume several small, nutrient dense meals/snacks during the day when appetite is decreased.     Nanci Penaloza, RD, LD, MPH

## 2025-01-22 ENCOUNTER — TELEPHONE (OUTPATIENT)
Dept: ONCOLOGY | Age: 70
End: 2025-01-22

## 2025-01-22 NOTE — TELEPHONE ENCOUNTER
Contacted pt re: positive distress screen.  Pt is 69-year-old female recently evaluated by radiation oncology for recurrent endometrial cancer.  Introduced self and role of oncology social work.  Reviewed distress screen dated 1/20/2025:    Practical:  No immediate barriers to care identified at this time.      Emotional:  Pt expressed no significant concerns re: depression/anxiety.      Spiritual:  No immediate needs identified at this time.    Physical:  Pt reported ongoing physical symptoms.  She stated that she has a history of endometrial cancer as well as spinal issues.  She indicated that she is scheduled for an MRI next week to determine if she has metastasis from her cancer or if pain is cause by other issues.  Provided information on pallaitive care vs. Pain management.  Pt indicated that her PCP is providing pain management support at this time but noted that she had been referred to [facility] for additional support.  She inquired about pain management within [facility] but is requesting to defer at this time pending result of upcoming MRI.  Pt to contact office if she wishes to pursue referral to palliative care or pain management.    No additional needs identified at this time.  Reviewed role of oncology SW and encouraged pt to notify this provider if additional needs arise.    MARCK Kent, LISW-S  Oncology Social Worker   [phone]           Media Information    Document Information    Evaluation and Plan: Assessment   DISTRESS SCREENING 1/20/25 01/20/2025   Attached To:   Hospital Encounter on 1/20/25 with Marisa Melgar MD   Source Information    [facility]   Document History

## 2025-01-28 ENCOUNTER — HOSPITAL ENCOUNTER (OUTPATIENT)
Age: 70
Discharge: HOME | End: 2025-01-28
Payer: MEDICARE

## 2025-01-28 ENCOUNTER — HOSPITAL ENCOUNTER (OUTPATIENT)
Dept: MRI IMAGING | Age: 70
Discharge: HOME | End: 2025-01-30
Payer: MEDICARE

## 2025-01-28 DIAGNOSIS — C79.82 METASTASIS OF MALIGNANT NEOPLASM TO VAGINA (HCC): ICD-10-CM

## 2025-01-28 LAB
BUN SERPL-MCNC: 29 MG/DL (ref 6–23)
CREAT SERPL-MCNC: 1.7 MG/DL (ref 0.5–1)
GFR, ESTIMATED: 31 ML/MIN/1.73M2

## 2025-01-28 PROCEDURE — 82565 ASSAY OF CREATININE: CPT

## 2025-01-28 PROCEDURE — 84520 ASSAY OF UREA NITROGEN: CPT

## 2025-01-28 PROCEDURE — 72197 MRI PELVIS W/O & W/DYE: CPT

## 2025-01-28 PROCEDURE — A9579 GAD-BASE MR CONTRAST NOS,1ML: HCPCS | Performed by: RADIOLOGY

## 2025-01-28 PROCEDURE — 6360000004 HC RX CONTRAST MEDICATION: Performed by: RADIOLOGY

## 2025-01-28 PROCEDURE — 36415 COLL VENOUS BLD VENIPUNCTURE: CPT

## 2025-01-28 RX ADMIN — GADOTERIDOL 15 ML: 279.3 INJECTION, SOLUTION INTRAVENOUS at 12:46

## 2025-01-29 ENCOUNTER — TELEPHONE (OUTPATIENT)
Dept: CASE MANAGEMENT | Age: 70
End: 2025-01-29

## 2025-01-29 NOTE — TELEPHONE ENCOUNTER
Spoke with Vanessa RN with Dr. Brown's office in regards to p53 staining that was to be ordered. Per Vanessa, she does not see that an order was placed. I faxed her Dr. Melgar's consult note highlighting the discussion that was had amongst the two physicians about this testing. Vanessa will discuss this further with Dr. Brown tomorrow and call me back with an update.

## 2025-01-31 ENCOUNTER — TELEPHONE (OUTPATIENT)
Dept: CASE MANAGEMENT | Age: 70
End: 2025-01-31

## 2025-01-31 NOTE — TELEPHONE ENCOUNTER
Patient scheduled for SIM on 2/4. Received call back from JAMMIE Mendez regarding p53 staining. Testing was ordered today by Dr. Brown. Patient has a follow up appointment with him on 2/11 to go over results. Chemotherapy has not been ordered at this time. Will follow up on treatment planning after that appointment. I will update Dr. Melgar.

## 2025-02-04 ENCOUNTER — HOSPITAL ENCOUNTER (OUTPATIENT)
Dept: RADIATION ONCOLOGY | Age: 70
Discharge: HOME | End: 2025-02-04
Payer: MEDICARE

## 2025-02-04 PROCEDURE — 77334 RADIATION TREATMENT AID(S): CPT | Performed by: RADIOLOGY

## 2025-02-05 ENCOUNTER — TELEPHONE (OUTPATIENT)
Dept: CASE MANAGEMENT | Age: 70
End: 2025-02-05

## 2025-02-05 NOTE — TELEPHONE ENCOUNTER
Dr. Melgar updated that patient's p53 stain is still pending. Patient completed her SIM yesterday and will need some time for planning. Will check back with Dr. Brown's office early next week on p53 staining to determine if chemotherapy is needed.

## 2025-02-11 ENCOUNTER — TELEPHONE (OUTPATIENT)
Dept: CASE MANAGEMENT | Age: 70
End: 2025-02-11

## 2025-02-11 NOTE — TELEPHONE ENCOUNTER
Spoke with JAMMIE Mendez with Dr. Brown. She is going to check into results of p53 stain and call me with an update. Will update Dr. Melgar when update is received.

## 2025-02-12 ENCOUNTER — HOSPITAL ENCOUNTER (OUTPATIENT)
Dept: RADIATION ONCOLOGY | Age: 70
Discharge: HOME | End: 2025-02-12
Payer: MEDICARE

## 2025-02-12 PROCEDURE — 77301 RADIOTHERAPY DOSE PLAN IMRT: CPT | Performed by: RADIOLOGY

## 2025-02-12 PROCEDURE — 77338 DESIGN MLC DEVICE FOR IMRT: CPT | Performed by: RADIOLOGY

## 2025-02-12 PROCEDURE — 77300 RADIATION THERAPY DOSE PLAN: CPT | Performed by: RADIOLOGY

## 2025-02-18 ENCOUNTER — HOSPITAL ENCOUNTER (OUTPATIENT)
Dept: RADIATION ONCOLOGY | Age: 70
Discharge: HOME | End: 2025-02-18
Payer: MEDICARE

## 2025-02-18 PROCEDURE — 77386 HC NTSTY MODUL RAD TX DLVR CPLX: CPT | Performed by: RADIOLOGY

## 2025-02-19 ENCOUNTER — HOSPITAL ENCOUNTER (OUTPATIENT)
Dept: RADIATION ONCOLOGY | Age: 70
Discharge: HOME | End: 2025-02-19
Payer: MEDICARE

## 2025-02-19 VITALS
RESPIRATION RATE: 18 BRPM | SYSTOLIC BLOOD PRESSURE: 98 MMHG | TEMPERATURE: 98 F | HEART RATE: 61 BPM | BODY MASS INDEX: 32.34 KG/M2 | DIASTOLIC BLOOD PRESSURE: 50 MMHG | WEIGHT: 176.8 LBS

## 2025-02-19 DIAGNOSIS — C54.9 MALIGNANT NEOPLASM OF CORPUS UTERI, EXCEPT ISTHMUS (HCC): Primary | ICD-10-CM

## 2025-02-19 PROCEDURE — 77386 HC NTSTY MODUL RAD TX DLVR CPLX: CPT | Performed by: RADIOLOGY

## 2025-02-19 ASSESSMENT — PAIN DESCRIPTION - DESCRIPTORS: DESCRIPTORS: ACHING

## 2025-02-19 ASSESSMENT — PAIN DESCRIPTION - LOCATION: LOCATION: BACK;GROIN

## 2025-02-19 ASSESSMENT — PAIN DESCRIPTION - ORIENTATION: ORIENTATION: LOWER

## 2025-02-19 NOTE — PROGRESS NOTES
Álvaro Cervantes  2/19/2025  Wt Readings from Last 3 Encounters:   02/19/25 80.2 kg (176 lb 12.8 oz)   01/20/25 80.1 kg (176 lb 9.6 oz)   03/13/24 92.5 kg (204 lb)     Body mass index is 32.34 kg/m².      Treatment Area:Pelvis    Patient was seen today for weekly visit.      Comfort Alteration  KPS:80%  Fatigue: None    Mucous Membrane Alteration  Drainage: No  Drainage Odor: No  Vaginal Bleeding: No    Nutritional Alteration  Anorexia: No  Nausea: No  Vomiting: No     Elimination Alterations  Constipation: Yes  Diarrhea:  no  Urinary Frequency/Urgency: No  Urinary Retention: No  Dysuria: No  Urinary Incontinence: No  Proctitis: No    Skin Alteration   Sensation:na    Radiation Dermatitis:  none    Emotional  Coping: effective    Sexuality Alteration  na    Injury, potential bleeding or infection: na    Lab Results   Component Value Date    WBC 7.2 02/22/2023    HGB 10.0 (L) 02/22/2023    HCT 35.8 02/22/2023     02/22/2023         BP (!) 98/50 Comment: slight light headed when standing too fast, advised to get her bearing before standing up  Pulse 61   Temp 98 °F (36.7 °C) (Temporal)   Resp 18   Wt 80.2 kg (176 lb 12.8 oz)   BMI 32.34 kg/m²   BP within normal range? yes       Assessment/Plan:Completed 2/25 fractions; 360/4500 cGy    Drake Santos RN

## 2025-02-20 ENCOUNTER — HOSPITAL ENCOUNTER (OUTPATIENT)
Dept: RADIATION ONCOLOGY | Age: 70
Discharge: HOME | End: 2025-02-20
Payer: MEDICARE

## 2025-02-20 PROCEDURE — 77386 HC NTSTY MODUL RAD TX DLVR CPLX: CPT | Performed by: RADIOLOGY

## 2025-02-21 ENCOUNTER — HOSPITAL ENCOUNTER (OUTPATIENT)
Dept: RADIATION ONCOLOGY | Age: 70
Discharge: HOME | End: 2025-02-21
Payer: MEDICARE

## 2025-02-21 PROCEDURE — 77386 HC NTSTY MODUL RAD TX DLVR CPLX: CPT | Performed by: RADIOLOGY

## 2025-02-24 ENCOUNTER — HOSPITAL ENCOUNTER (OUTPATIENT)
Dept: RADIATION ONCOLOGY | Age: 70
Discharge: HOME | End: 2025-02-24
Payer: MEDICARE

## 2025-02-24 PROCEDURE — 77014 CHG CT GUIDANCE RADIATION THERAPY FLDS PLACEMENT: CPT | Performed by: RADIOLOGY

## 2025-02-24 PROCEDURE — 77386 HC NTSTY MODUL RAD TX DLVR CPLX: CPT | Performed by: RADIOLOGY

## 2025-02-24 PROCEDURE — 77336 RADIATION PHYSICS CONSULT: CPT | Performed by: RADIOLOGY

## 2025-02-24 PROCEDURE — 77427 RADIATION TX MANAGEMENT X5: CPT | Performed by: RADIOLOGY

## 2025-02-25 ENCOUNTER — HOSPITAL ENCOUNTER (OUTPATIENT)
Dept: RADIATION ONCOLOGY | Age: 70
Discharge: HOME | End: 2025-02-25
Payer: MEDICARE

## 2025-02-25 PROCEDURE — 77386 HC NTSTY MODUL RAD TX DLVR CPLX: CPT | Performed by: RADIOLOGY

## 2025-02-26 ENCOUNTER — HOSPITAL ENCOUNTER (OUTPATIENT)
Dept: RADIATION ONCOLOGY | Age: 70
Discharge: HOME | End: 2025-02-26
Payer: MEDICARE

## 2025-02-26 VITALS
TEMPERATURE: 98.1 F | DIASTOLIC BLOOD PRESSURE: 56 MMHG | HEART RATE: 67 BPM | RESPIRATION RATE: 18 BRPM | BODY MASS INDEX: 31.83 KG/M2 | SYSTOLIC BLOOD PRESSURE: 94 MMHG | WEIGHT: 174 LBS

## 2025-02-26 DIAGNOSIS — C54.9 MALIGNANT NEOPLASM OF CORPUS UTERI, EXCEPT ISTHMUS (HCC): Primary | ICD-10-CM

## 2025-02-26 PROCEDURE — 77386 HC NTSTY MODUL RAD TX DLVR CPLX: CPT | Performed by: RADIOLOGY

## 2025-02-26 NOTE — PROGRESS NOTES
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      Past medical, surgical, social and family histories reviewed and updated as indicated.    PAIN: No    ALLERGIES:  Gabapentin         Current Outpatient Medications   Medication Sig Dispense Refill    HYDROcodone-acetaminophen (NORCO) 7.5-325 MG per tablet Take 1 tablet by mouth every 6 hours as needed for Pain. Max Daily Amount: 4 tablets      sennosides-docusate sodium (SENOKOT-S) 8.6-50 MG tablet Take 1 tablet by mouth daily      levalbuterol (XOPENEX HFA) 45 MCG/ACT inhaler Inhale 1 puff into the lungs every 4 hours as needed for Wheezing 15 g 3    ONETOUCH ULTRA strip       GLOBAL EASE INJECT PEN NEEDLES 31G X 5 MM MISC       lidocaine-prilocaine (EMLA) 2.5-2.5 % cream       tiZANidine (ZANAFLEX) 2 MG tablet       losartan (COZAAR) 50 MG tablet       omeprazole (PRILOSEC) 40 MG delayed release capsule       Saline (SHARON NASAL MIST ALLERGY/SINUS NA) by Nasal route      Semaglutide,0.25 or 0.5MG/DOS, (OZEMPIC, 0.25 OR 0.5 MG/DOSE,) 2 MG/1.5ML SOPN Inject into the skin once a week States stopped taking this since last month      metoprolol succinate (TOPROL XL) 50 MG extended release tablet Take 1 tablet by mouth in the morning and at bedtime 60 tablet 3    traZODone (DESYREL) 150 MG tablet       acetaminophen (TYLENOL) 500 MG tablet Take 1 tablet by mouth 4 times daily as needed for Pain 120 tablet 1    vitamin B-12 (CYANOCOBALAMIN) 500 MCG tablet Take 1 tablet by mouth daily      ascorbic acid (VITAMIN C) 500 MG tablet Take 2 tablets by mouth daily      zinc gluconate 50 MG tablet Take 1 tablet by mouth daily      Oxygen Concentrator 2 L/min as needed      NITROSTAT

## 2025-02-26 NOTE — PROGRESS NOTES
Álvaro Cervantes  2/26/2025  Wt Readings from Last 3 Encounters:   02/26/25 78.9 kg (174 lb)   02/19/25 80.2 kg (176 lb 12.8 oz)   01/20/25 80.1 kg (176 lb 9.6 oz)     Body mass index is 31.83 kg/m².      Treatment Area:Pelvis    Patient was seen today for weekly visit.      Comfort Alteration  KPS:80%  Fatigue: None    Mucous Membrane Alteration  Drainage: No  Drainage Odor: No  Vaginal Bleeding: No    Nutritional Alteration  Anorexia: No  Nausea: No  Vomiting: No     Elimination Alterations  Constipation: yes  Diarrhea:  no  Urinary Frequency/Urgency: No  Urinary Retention: No  Dysuria: No  Urinary Incontinence: No  Proctitis: No    Skin Alteration   Sensation:none    Radiation Dermatitis:  none    Emotional  Coping: effective    Sexuality Alteration  na    Injury, potential bleeding or infection: na    Lab Results   Component Value Date    WBC 7.2 02/22/2023    HGB 10.0 (L) 02/22/2023    HCT 35.8 02/22/2023     02/22/2023         BP (!) 94/56   Pulse 67   Temp 98.1 °F (36.7 °C) (Temporal)   Resp 18   Wt 78.9 kg (174 lb)   BMI 31.83 kg/m²   BP within normal range? yes       Assessment/Plan:    Drake Santos RN Completed 7/25 fractions; 1260/4500 cGy

## 2025-02-27 ENCOUNTER — HOSPITAL ENCOUNTER (OUTPATIENT)
Dept: RADIATION ONCOLOGY | Age: 70
Discharge: HOME | End: 2025-02-27
Payer: MEDICARE

## 2025-02-27 PROCEDURE — 77386 HC NTSTY MODUL RAD TX DLVR CPLX: CPT | Performed by: RADIOLOGY

## 2025-02-28 ENCOUNTER — HOSPITAL ENCOUNTER (OUTPATIENT)
Dept: RADIATION ONCOLOGY | Age: 70
Discharge: HOME | End: 2025-02-28
Payer: MEDICARE

## 2025-02-28 PROCEDURE — 77386 HC NTSTY MODUL RAD TX DLVR CPLX: CPT | Performed by: RADIOLOGY

## 2025-03-03 ENCOUNTER — HOSPITAL ENCOUNTER (OUTPATIENT)
Dept: RADIATION ONCOLOGY | Age: 70
Discharge: HOME | End: 2025-03-03
Payer: MEDICARE

## 2025-03-03 PROCEDURE — 77336 RADIATION PHYSICS CONSULT: CPT | Performed by: RADIOLOGY

## 2025-03-03 PROCEDURE — 77386 HC NTSTY MODUL RAD TX DLVR CPLX: CPT | Performed by: RADIOLOGY

## 2025-03-03 PROCEDURE — 77427 RADIATION TX MANAGEMENT X5: CPT | Performed by: RADIOLOGY

## 2025-03-03 PROCEDURE — 77014 CHG CT GUIDANCE RADIATION THERAPY FLDS PLACEMENT: CPT | Performed by: RADIOLOGY

## 2025-03-04 ENCOUNTER — HOSPITAL ENCOUNTER (OUTPATIENT)
Dept: RADIATION ONCOLOGY | Age: 70
Discharge: HOME | End: 2025-03-04
Payer: MEDICARE

## 2025-03-04 PROCEDURE — 77386 HC NTSTY MODUL RAD TX DLVR CPLX: CPT | Performed by: RADIOLOGY

## 2025-03-05 ENCOUNTER — HOSPITAL ENCOUNTER (OUTPATIENT)
Dept: RADIATION ONCOLOGY | Age: 70
Discharge: HOME | End: 2025-03-05
Payer: MEDICARE

## 2025-03-05 VITALS
SYSTOLIC BLOOD PRESSURE: 117 MMHG | WEIGHT: 174 LBS | DIASTOLIC BLOOD PRESSURE: 52 MMHG | HEART RATE: 67 BPM | TEMPERATURE: 98.1 F | RESPIRATION RATE: 18 BRPM | BODY MASS INDEX: 31.83 KG/M2

## 2025-03-05 DIAGNOSIS — C54.9 MALIGNANT NEOPLASM OF CORPUS UTERI, EXCEPT ISTHMUS (HCC): Primary | ICD-10-CM

## 2025-03-05 PROCEDURE — 77386 HC NTSTY MODUL RAD TX DLVR CPLX: CPT | Performed by: RADIOLOGY

## 2025-03-05 ASSESSMENT — PAIN DESCRIPTION - LOCATION: LOCATION: BACK;GROIN

## 2025-03-05 ASSESSMENT — PAIN SCALES - GENERAL: PAINLEVEL_OUTOF10: 8

## 2025-03-05 ASSESSMENT — PAIN DESCRIPTION - ORIENTATION: ORIENTATION: MID;LOWER

## 2025-03-05 NOTE — PROGRESS NOTES
Álvaro Cervantes  3/5/2025  Wt Readings from Last 3 Encounters:   03/05/25 78.9 kg (174 lb)   02/26/25 78.9 kg (174 lb)   02/19/25 80.2 kg (176 lb 12.8 oz)     Body mass index is 31.83 kg/m².      Treatment Area:Pelvis and groins    Patient was seen today for weekly visit.      Comfort Alteration  KPS:80%  Fatigue: Mild    Mucous Membrane Alteration  Drainage: Yes  Drainage Odor: Yes  Vaginal Bleeding: Yes, off and on    Nutritional Alteration  Anorexia: No  Nausea: Yes, a little bit  Vomiting: No     Elimination Alterations  Constipation: no  Diarrhea:  no  Urinary Frequency/Urgency: No  Urinary Retention: No  Dysuria: No  Urinary Incontinence: No  Proctitis: No    Skin Alteration   Sensation:none    Radiation Dermatitis:  none    Emotional  Coping: effective    Sexuality Alteration  na    Injury, potential bleeding or infection: na    Lab Results   Component Value Date    WBC 7.2 02/22/2023    HGB 10.0 (L) 02/22/2023    HCT 35.8 02/22/2023     02/22/2023         BP (!) 117/52   Pulse 67   Temp 98.1 °F (36.7 °C) (Temporal)   Resp 18   Wt 78.9 kg (174 lb)   BMI 31.83 kg/m²   BP within normal range? yes       Assessment/Plan:Completed 12/25 fractions; 2160/4500 cGy    Drake Santos RN

## 2025-03-05 NOTE — PROGRESS NOTES
DEPARTMENT OF RADIATION ONCOLOGY   ON TREATMENT VISIT       3/5/2025      NAME:  Álvaro Cervantes    YOB: 1955    DIAGNOSIS:  Distal vaginal recurrence from  endometrioid adenocarcinoma of the endometrium, SP LILLI/BSO in 2021    SUBJECTIVE:   Álvaro Cervantes has now received 2160 cGy in 12 fractions directed to the pelvis and groins.  No more bleeding and improved pain.      Past medical, surgical, social and family histories reviewed and updated as indicated.    PAIN: No    ALLERGIES:  Gabapentin         Current Outpatient Medications   Medication Sig Dispense Refill    HYDROcodone-acetaminophen (NORCO) 7.5-325 MG per tablet Take 1 tablet by mouth every 6 hours as needed for Pain. Max Daily Amount: 4 tablets      sennosides-docusate sodium (SENOKOT-S) 8.6-50 MG tablet Take 1 tablet by mouth daily      levalbuterol (XOPENEX HFA) 45 MCG/ACT inhaler Inhale 1 puff into the lungs every 4 hours as needed for Wheezing 15 g 3    GLOBAL EASE INJECT PEN NEEDLES 31G X 5 MM MISC       lidocaine-prilocaine (EMLA) 2.5-2.5 % cream       tiZANidine (ZANAFLEX) 2 MG tablet       losartan (COZAAR) 50 MG tablet       omeprazole (PRILOSEC) 40 MG delayed release capsule       Saline (SHARON NASAL MIST ALLERGY/SINUS NA) by Nasal route      Semaglutide,0.25 or 0.5MG/DOS, (OZEMPIC, 0.25 OR 0.5 MG/DOSE,) 2 MG/1.5ML SOPN Inject into the skin once a week States stopped taking this since last month      metoprolol succinate (TOPROL XL) 50 MG extended release tablet Take 1 tablet by mouth in the morning and at bedtime 60 tablet 3    traZODone (DESYREL) 150 MG tablet       acetaminophen (TYLENOL) 500 MG tablet Take 1 tablet by mouth 4 times daily as needed for Pain 120 tablet 1    vitamin B-12 (CYANOCOBALAMIN) 500 MCG tablet Take 1 tablet by mouth daily      ascorbic acid (VITAMIN C) 500 MG tablet Take 2 tablets by mouth daily      zinc gluconate 50 MG tablet Take 1 tablet by mouth daily      Oxygen Concentrator 2 L/min as needed

## 2025-03-06 ENCOUNTER — HOSPITAL ENCOUNTER (OUTPATIENT)
Dept: RADIATION ONCOLOGY | Age: 70
Discharge: HOME | End: 2025-03-06
Payer: MEDICARE

## 2025-03-06 PROCEDURE — 77386 HC NTSTY MODUL RAD TX DLVR CPLX: CPT | Performed by: RADIOLOGY

## 2025-03-07 ENCOUNTER — HOSPITAL ENCOUNTER (OUTPATIENT)
Dept: RADIATION ONCOLOGY | Age: 70
Discharge: HOME | End: 2025-03-07
Payer: MEDICARE

## 2025-03-07 PROCEDURE — 77386 HC NTSTY MODUL RAD TX DLVR CPLX: CPT | Performed by: RADIOLOGY

## 2025-03-10 ENCOUNTER — APPOINTMENT (OUTPATIENT)
Dept: RADIATION ONCOLOGY | Age: 70
End: 2025-03-10
Payer: MEDICARE

## 2025-03-11 ENCOUNTER — HOSPITAL ENCOUNTER (OUTPATIENT)
Dept: RADIATION ONCOLOGY | Age: 70
Discharge: HOME | End: 2025-03-11
Payer: MEDICARE

## 2025-03-11 PROCEDURE — 77336 RADIATION PHYSICS CONSULT: CPT | Performed by: RADIOLOGY

## 2025-03-11 PROCEDURE — 77386 HC NTSTY MODUL RAD TX DLVR CPLX: CPT | Performed by: RADIOLOGY

## 2025-03-12 ENCOUNTER — HOSPITAL ENCOUNTER (OUTPATIENT)
Dept: RADIATION ONCOLOGY | Age: 70
Discharge: HOME | End: 2025-03-12
Payer: MEDICARE

## 2025-03-12 VITALS
BODY MASS INDEX: 31.9 KG/M2 | DIASTOLIC BLOOD PRESSURE: 66 MMHG | RESPIRATION RATE: 18 BRPM | SYSTOLIC BLOOD PRESSURE: 114 MMHG | WEIGHT: 174.4 LBS | HEART RATE: 65 BPM | TEMPERATURE: 97 F

## 2025-03-12 DIAGNOSIS — L53.9 ERYTHEMA: Primary | ICD-10-CM

## 2025-03-12 DIAGNOSIS — C54.9 MALIGNANT NEOPLASM OF CORPUS UTERI, EXCEPT ISTHMUS (HCC): Primary | ICD-10-CM

## 2025-03-12 DIAGNOSIS — R30.0 DYSURIA: Primary | ICD-10-CM

## 2025-03-12 DIAGNOSIS — T66.XXXA ADVERSE EFFECT OF RADIATION, INITIAL ENCOUNTER: Primary | ICD-10-CM

## 2025-03-12 PROCEDURE — 77386 HC NTSTY MODUL RAD TX DLVR CPLX: CPT | Performed by: RADIOLOGY

## 2025-03-12 RX ORDER — SILVER SULFADIAZINE 10 MG/G
CREAM TOPICAL
Qty: 50 G | Refills: 2 | Status: SHIPPED | OUTPATIENT
Start: 2025-03-12

## 2025-03-12 NOTE — PROGRESS NOTES
Álvaro Cervantes  3/12/2025  Wt Readings from Last 3 Encounters:   03/12/25 79.1 kg (174 lb 6.4 oz)   03/05/25 78.9 kg (174 lb)   02/26/25 78.9 kg (174 lb)     Body mass index is 31.9 kg/m².      Treatment Area:Pelvis    Patient was seen today for weekly visit.      Comfort Alteration  KPS:80%  Fatigue: Mild    Mucous Membrane Alteration  Drainage: No  Drainage Odor: No  Vaginal Bleeding: No    Nutritional Alteration  Anorexia: No  Nausea: No  Vomiting: No     Elimination Alterations  Constipation: no  Diarrhea:  yes  Urinary Frequency/Urgency: Yes frequency  Urinary Retention: No  Dysuria: Yes  Urinary Incontinence: No  Proctitis: No    Skin Alteration   Sensation:no issues    Radiation Dermatitis:  none    Emotional  Coping: effective    Sexuality Alteration  na    Injury, potential bleeding or infection: na    Lab Results   Component Value Date    WBC 7.2 02/22/2023    HGB 10.0 (L) 02/22/2023    HCT 35.8 02/22/2023     02/22/2023         /66   Pulse 65   Temp 97 °F (36.1 °C) (Temporal)   Resp 18   Wt 79.1 kg (174 lb 6.4 oz)   BMI 31.90 kg/m²   BP within normal range? yes      Assessment/Plan:Completed 16/25 fractions; 2880/4500 cGy    Drake Santos RN

## 2025-03-12 NOTE — PROGRESS NOTES
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  Patient complains of dysuria.  Prescribed Pyridium.  Continue XRT.      Past medical, surgical, social and family histories reviewed and updated as indicated.    PAIN: No    ALLERGIES:  Gabapentin         Current Outpatient Medications   Medication Sig Dispense Refill    silver sulfADIAZINE (SILVADENE) 1 % cream Apply topically 2 to 3 times daily.  Okay to mix with Aquaphor and Desitin 50 g 2    HYDROcodone-acetaminophen (NORCO) 7.5-325 MG per tablet Take 1 tablet by mouth every 6 hours as needed for Pain. Max Daily Amount: 4 tablets      sennosides-docusate sodium (SENOKOT-S) 8.6-50 MG tablet Take 1 tablet by mouth daily      levalbuterol (XOPENEX HFA) 45 MCG/ACT inhaler Inhale 1 puff into the lungs every 4 hours as needed for Wheezing 15 g 3    GLOBAL EASE INJECT PEN NEEDLES 31G X 5 MM MISC       lidocaine-prilocaine (EMLA) 2.5-2.5 % cream       tiZANidine (ZANAFLEX) 2 MG tablet       losartan (COZAAR) 50 MG tablet       omeprazole (PRILOSEC) 40 MG delayed release capsule       Saline (SHARON NASAL MIST ALLERGY/SINUS NA) by Nasal route      Semaglutide,0.25 or 0.5MG/DOS, (OZEMPIC, 0.25 OR 0.5 MG/DOSE,) 2 MG/1.5ML SOPN Inject into the skin once a week States stopped taking this since last month      metoprolol succinate (TOPROL XL) 50 MG extended release tablet Take 1 tablet by mouth in the morning and at bedtime 60 tablet 3    traZODone (DESYREL) 150 MG tablet       acetaminophen (TYLENOL) 500 MG tablet Take 1 tablet by mouth 4 times daily as needed for Pain 120 tablet 1    vitamin B-12 (CYANOCOBALAMIN) 500 MCG tablet Take 1 tablet by mouth daily      ascorbic acid

## 2025-03-13 ENCOUNTER — HOSPITAL ENCOUNTER (OUTPATIENT)
Dept: RADIATION ONCOLOGY | Age: 70
Discharge: HOME | End: 2025-03-13
Payer: MEDICARE

## 2025-03-13 PROCEDURE — 77386 HC NTSTY MODUL RAD TX DLVR CPLX: CPT | Performed by: RADIOLOGY

## 2025-03-14 ENCOUNTER — HOSPITAL ENCOUNTER (OUTPATIENT)
Dept: RADIATION ONCOLOGY | Age: 70
Discharge: HOME | End: 2025-03-14
Payer: MEDICARE

## 2025-03-14 PROCEDURE — 77386 HC NTSTY MODUL RAD TX DLVR CPLX: CPT | Performed by: RADIOLOGY

## 2025-03-17 ENCOUNTER — HOSPITAL ENCOUNTER (OUTPATIENT)
Dept: RADIATION ONCOLOGY | Age: 70
Discharge: HOME | End: 2025-03-17
Payer: MEDICARE

## 2025-03-17 PROCEDURE — 77386 HC NTSTY MODUL RAD TX DLVR CPLX: CPT | Performed by: RADIOLOGY

## 2025-03-17 PROCEDURE — 77014 CHG CT GUIDANCE RADIATION THERAPY FLDS PLACEMENT: CPT | Performed by: RADIOLOGY

## 2025-03-18 ENCOUNTER — HOSPITAL ENCOUNTER (OUTPATIENT)
Dept: RADIATION ONCOLOGY | Age: 70
Discharge: HOME | End: 2025-03-18
Payer: MEDICARE

## 2025-03-18 PROCEDURE — 77386 HC NTSTY MODUL RAD TX DLVR CPLX: CPT | Performed by: RADIOLOGY

## 2025-03-18 PROCEDURE — 77336 RADIATION PHYSICS CONSULT: CPT | Performed by: RADIOLOGY

## 2025-03-19 ENCOUNTER — HOSPITAL ENCOUNTER (OUTPATIENT)
Dept: RADIATION ONCOLOGY | Age: 70
Discharge: HOME | End: 2025-03-19
Payer: MEDICARE

## 2025-03-19 PROCEDURE — 77386 HC NTSTY MODUL RAD TX DLVR CPLX: CPT | Performed by: RADIOLOGY

## 2025-03-20 ENCOUNTER — HOSPITAL ENCOUNTER (OUTPATIENT)
Dept: RADIATION ONCOLOGY | Age: 70
Discharge: HOME | End: 2025-03-20
Payer: MEDICARE

## 2025-03-20 PROCEDURE — 77386 HC NTSTY MODUL RAD TX DLVR CPLX: CPT | Performed by: RADIOLOGY

## 2025-03-21 ENCOUNTER — HOSPITAL ENCOUNTER (OUTPATIENT)
Dept: RADIATION ONCOLOGY | Age: 70
Discharge: HOME | End: 2025-03-21
Payer: MEDICARE

## 2025-03-21 VITALS
SYSTOLIC BLOOD PRESSURE: 113 MMHG | HEART RATE: 59 BPM | DIASTOLIC BLOOD PRESSURE: 65 MMHG | TEMPERATURE: 97.5 F | RESPIRATION RATE: 18 BRPM | BODY MASS INDEX: 31.13 KG/M2 | WEIGHT: 170.2 LBS

## 2025-03-21 DIAGNOSIS — C54.1 ENDOMETRIAL CANCER (HCC): Primary | ICD-10-CM

## 2025-03-21 DIAGNOSIS — C54.9 MALIGNANT NEOPLASM OF CORPUS UTERI, EXCEPT ISTHMUS (HCC): Primary | ICD-10-CM

## 2025-03-21 PROCEDURE — 77386 HC NTSTY MODUL RAD TX DLVR CPLX: CPT | Performed by: RADIOLOGY

## 2025-03-21 NOTE — PROGRESS NOTES
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  Erythema grade 2.  Pelvic examination to be scheduled next week, requested the new MRI for evaluation of brachytherapy.      Past medical, surgical, social and family histories reviewed and updated as indicated.    PAIN: No    ALLERGIES:  Gabapentin         Current Outpatient Medications   Medication Sig Dispense Refill    silver sulfADIAZINE (SILVADENE) 1 % cream Apply topically 2 to 3 times daily.  Okay to mix with Aquaphor and Desitin 50 g 2    HYDROcodone-acetaminophen (NORCO) 7.5-325 MG per tablet Take 1 tablet by mouth every 6 hours as needed for Pain. Max Daily Amount: 4 tablets      sennosides-docusate sodium (SENOKOT-S) 8.6-50 MG tablet Take 1 tablet by mouth daily      levalbuterol (XOPENEX HFA) 45 MCG/ACT inhaler Inhale 1 puff into the lungs every 4 hours as needed for Wheezing 15 g 3    GLOBAL EASE INJECT PEN NEEDLES 31G X 5 MM MISC       lidocaine-prilocaine (EMLA) 2.5-2.5 % cream       tiZANidine (ZANAFLEX) 2 MG tablet       losartan (COZAAR) 50 MG tablet       omeprazole (PRILOSEC) 40 MG delayed release capsule       Saline (SHARON NASAL MIST ALLERGY/SINUS NA) by Nasal route      Semaglutide,0.25 or 0.5MG/DOS, (OZEMPIC, 0.25 OR 0.5 MG/DOSE,) 2 MG/1.5ML SOPN Inject into the skin once a week States stopped taking this since last month      metoprolol succinate (TOPROL XL) 50 MG extended release tablet Take 1 tablet by mouth in the morning and at bedtime 60 tablet 3    traZODone (DESYREL) 150 MG tablet       acetaminophen (TYLENOL) 500 MG tablet Take 1 tablet by mouth 4 times daily as needed for Pain 120 tablet 1    vitamin B-12 (CYANOCOBALAMIN) 500 MCG tablet

## 2025-03-21 NOTE — PROGRESS NOTES
Álvaro Cervantes  3/21/2025  Wt Readings from Last 3 Encounters:   03/21/25 77.2 kg (170 lb 3.2 oz)   03/12/25 79.1 kg (174 lb 6.4 oz)   03/05/25 78.9 kg (174 lb)     Body mass index is 31.13 kg/m².      Treatment Area:Pelvis and groins    Patient was seen today for weekly visit.      Comfort Alteration  KPS:80%  Fatigue: Mild    Mucous Membrane Alteration  Drainage: No  Drainage Odor: No  Vaginal Bleeding: No    Nutritional Alteration  Anorexia: No  Nausea: No  Vomiting: No     Elimination Alterations  Constipation: no  Diarrhea:  Yes , not today  Urinary Frequency/Urgency: No  Urinary Retention: No  Dysuria: Yes  Urinary Incontinence: No  Proctitis: No    Skin Alteration   Sensation:none    Radiation Dermatitis:  none    Emotional  Coping: effective    Sexuality Alteration  na    Injury, potential bleeding or infection: na    Lab Results   Component Value Date    WBC 7.2 02/22/2023    HGB 10.0 (L) 02/22/2023    HCT 35.8 02/22/2023     02/22/2023         /65   Pulse 59   Temp 97.5 °F (36.4 °C) (Temporal)   Resp 18   Wt 77.2 kg (170 lb 3.2 oz)   BMI 31.13 kg/m²   BP within normal range? yes     Assessment/Plan:  Completed 23/25 fractions; 4140/4500 cGy    Drake Santos RN

## 2025-03-24 ENCOUNTER — HOSPITAL ENCOUNTER (OUTPATIENT)
Dept: RADIATION ONCOLOGY | Age: 70
Discharge: HOME | End: 2025-03-24
Payer: MEDICARE

## 2025-03-24 PROCEDURE — 77014 CHG CT GUIDANCE RADIATION THERAPY FLDS PLACEMENT: CPT | Performed by: RADIOLOGY

## 2025-03-24 PROCEDURE — 77386 HC NTSTY MODUL RAD TX DLVR CPLX: CPT | Performed by: RADIOLOGY

## 2025-03-24 NOTE — PROGRESS NOTES
Álvaro Cervantes  3/24/2025  2:44 PM          Current Outpatient Medications   Medication Sig Dispense Refill    silver sulfADIAZINE (SILVADENE) 1 % cream Apply topically 2 to 3 times daily.  Okay to mix with Aquaphor and Desitin 50 g 2    HYDROcodone-acetaminophen (NORCO) 7.5-325 MG per tablet Take 1 tablet by mouth every 6 hours as needed for Pain. Max Daily Amount: 4 tablets      sennosides-docusate sodium (SENOKOT-S) 8.6-50 MG tablet Take 1 tablet by mouth daily      levalbuterol (XOPENEX HFA) 45 MCG/ACT inhaler Inhale 1 puff into the lungs every 4 hours as needed for Wheezing 15 g 3    GLOBAL EASE INJECT PEN NEEDLES 31G X 5 MM MISC       lidocaine-prilocaine (EMLA) 2.5-2.5 % cream       tiZANidine (ZANAFLEX) 2 MG tablet       losartan (COZAAR) 50 MG tablet       omeprazole (PRILOSEC) 40 MG delayed release capsule       Saline (SHARON NASAL MIST ALLERGY/SINUS NA) by Nasal route      Semaglutide,0.25 or 0.5MG/DOS, (OZEMPIC, 0.25 OR 0.5 MG/DOSE,) 2 MG/1.5ML SOPN Inject into the skin once a week States stopped taking this since last month      metoprolol succinate (TOPROL XL) 50 MG extended release tablet Take 1 tablet by mouth in the morning and at bedtime 60 tablet 3    traZODone (DESYREL) 150 MG tablet       acetaminophen (TYLENOL) 500 MG tablet Take 1 tablet by mouth 4 times daily as needed for Pain 120 tablet 1    vitamin B-12 (CYANOCOBALAMIN) 500 MCG tablet Take 1 tablet by mouth daily      ascorbic acid (VITAMIN C) 500 MG tablet Take 2 tablets by mouth daily      zinc gluconate 50 MG tablet Take 1 tablet by mouth daily      Oxygen Concentrator 2 L/min as needed      NITROSTAT 0.4 MG SL tablet Place 1 tablet under the tongue every 5 minutes as needed for Chest pain 25 tablet 1    atorvastatin (LIPITOR) 80 MG tablet Take 1 tablet by mouth daily      Insulin Degludec (TRESIBA FLEXTOUCH) 200 UNIT/ML SOPN Inject 44 Units into the skin nightly      famotidine (PEPCID) 20 MG tablet Take 1 tablet by mouth daily

## 2025-03-25 ENCOUNTER — HOSPITAL ENCOUNTER (OUTPATIENT)
Dept: MRI IMAGING | Age: 70
Discharge: HOME | End: 2025-03-27
Attending: RADIOLOGY
Payer: MEDICARE

## 2025-03-25 ENCOUNTER — HOSPITAL ENCOUNTER (OUTPATIENT)
Dept: RADIATION ONCOLOGY | Age: 70
Discharge: HOME | End: 2025-03-25
Payer: MEDICARE

## 2025-03-25 DIAGNOSIS — C54.1 ENDOMETRIAL CANCER (HCC): ICD-10-CM

## 2025-03-25 PROCEDURE — 77386 HC NTSTY MODUL RAD TX DLVR CPLX: CPT | Performed by: RADIOLOGY

## 2025-03-25 PROCEDURE — 77336 RADIATION PHYSICS CONSULT: CPT | Performed by: RADIOLOGY

## 2025-03-25 PROCEDURE — 72195 MRI PELVIS W/O DYE: CPT

## 2025-03-25 NOTE — PROGRESS NOTES
Álvaro Cervantes  3/25/2025  9:07 AM          Current Outpatient Medications   Medication Sig Dispense Refill    silver sulfADIAZINE (SILVADENE) 1 % cream Apply topically 2 to 3 times daily.  Okay to mix with Aquaphor and Desitin 50 g 2    HYDROcodone-acetaminophen (NORCO) 7.5-325 MG per tablet Take 1 tablet by mouth every 6 hours as needed for Pain. Max Daily Amount: 4 tablets      sennosides-docusate sodium (SENOKOT-S) 8.6-50 MG tablet Take 1 tablet by mouth daily      levalbuterol (XOPENEX HFA) 45 MCG/ACT inhaler Inhale 1 puff into the lungs every 4 hours as needed for Wheezing 15 g 3    GLOBAL EASE INJECT PEN NEEDLES 31G X 5 MM MISC       lidocaine-prilocaine (EMLA) 2.5-2.5 % cream       tiZANidine (ZANAFLEX) 2 MG tablet       losartan (COZAAR) 50 MG tablet       omeprazole (PRILOSEC) 40 MG delayed release capsule       Saline (SHARON NASAL MIST ALLERGY/SINUS NA) by Nasal route      Semaglutide,0.25 or 0.5MG/DOS, (OZEMPIC, 0.25 OR 0.5 MG/DOSE,) 2 MG/1.5ML SOPN Inject into the skin once a week States stopped taking this since last month      metoprolol succinate (TOPROL XL) 50 MG extended release tablet Take 1 tablet by mouth in the morning and at bedtime 60 tablet 3    traZODone (DESYREL) 150 MG tablet       acetaminophen (TYLENOL) 500 MG tablet Take 1 tablet by mouth 4 times daily as needed for Pain 120 tablet 1    vitamin B-12 (CYANOCOBALAMIN) 500 MCG tablet Take 1 tablet by mouth daily      ascorbic acid (VITAMIN C) 500 MG tablet Take 2 tablets by mouth daily      zinc gluconate 50 MG tablet Take 1 tablet by mouth daily      Oxygen Concentrator 2 L/min as needed      NITROSTAT 0.4 MG SL tablet Place 1 tablet under the tongue every 5 minutes as needed for Chest pain 25 tablet 1    atorvastatin (LIPITOR) 80 MG tablet Take 1 tablet by mouth daily      Insulin Degludec (TRESIBA FLEXTOUCH) 200 UNIT/ML SOPN Inject 44 Units into the skin nightly      famotidine (PEPCID) 20 MG tablet Take 1 tablet by mouth daily

## 2025-04-04 DIAGNOSIS — C54.1 ENDOMETRIAL CANCER (HCC): Primary | ICD-10-CM

## 2025-04-04 RX ORDER — OXYCODONE HYDROCHLORIDE 15 MG/1
15 TABLET, FILM COATED, EXTENDED RELEASE ORAL 2 TIMES DAILY
Qty: 60 TABLET | Refills: 0 | Status: SHIPPED | OUTPATIENT
Start: 2025-04-04 | End: 2025-04-04

## 2025-04-08 ENCOUNTER — TELEPHONE (OUTPATIENT)
Dept: PALLATIVE CARE | Age: 70
End: 2025-04-08

## 2025-04-08 NOTE — TELEPHONE ENCOUNTER
Called and spoke with Álvaro regarding referral for Palliative Care. Álvaro states she is already seeing pain management at [facility]. No appointment with our team needed.

## 2025-06-05 ENCOUNTER — HOSPITAL ENCOUNTER (OUTPATIENT)
Dept: ULTRASOUND IMAGING | Age: 70
Discharge: HOME | End: 2025-06-05
Payer: MEDICARE

## 2025-06-05 DIAGNOSIS — N18.32 CHRONIC KIDNEY DISEASE (CKD) STAGE G3B/A1, MODERATELY DECREASED GLOMERULAR FILTRATION RATE (GFR) BETWEEN 30-44 ML/MIN/1.73 SQUARE METER AND ALBUMINURIA CREATININE RATIO LESS THAN 30 MG/G (HCC): ICD-10-CM

## 2025-06-05 DIAGNOSIS — I50.32 CHRONIC DIASTOLIC HEART FAILURE (HCC): ICD-10-CM

## 2025-06-05 PROCEDURE — 76775 US EXAM ABDO BACK WALL LIM: CPT

## 2025-06-06 ENCOUNTER — HOSPITAL ENCOUNTER (OUTPATIENT)
Dept: CARDIOLOGY | Age: 70
Discharge: HOME | End: 2025-06-08
Payer: MEDICARE

## 2025-06-06 VITALS — HEIGHT: 62 IN | BODY MASS INDEX: 31.28 KG/M2 | WEIGHT: 170 LBS

## 2025-06-06 DIAGNOSIS — I50.9 CONGESTIVE HEART FAILURE (CHF) (HCC): ICD-10-CM

## 2025-06-06 LAB
ECHO AO ASC DIAM: 2.7 CM
ECHO AO ASCENDING AORTA INDEX: 1.52 CM/M2
ECHO AV AREA PEAK VELOCITY: 2.6 CM2
ECHO AV AREA VTI: 2.5 CM2
ECHO AV AREA/BSA PEAK VELOCITY: 1.5 CM2/M2
ECHO AV AREA/BSA VTI: 1.4 CM2/M2
ECHO AV CUSP MM: 2.2 CM
ECHO AV MEAN GRADIENT: 7 MMHG
ECHO AV MEAN VELOCITY: 1.2 M/S
ECHO AV PEAK GRADIENT: 12 MMHG
ECHO AV PEAK VELOCITY: 1.8 M/S
ECHO AV VELOCITY RATIO: 0.78
ECHO AV VTI: 44.7 CM
ECHO BSA: 1.84 M2
ECHO EST RA PRESSURE: 8 MMHG
ECHO LA DIAMETER INDEX: 2.7 CM/M2
ECHO LA DIAMETER: 4.8 CM
ECHO LA VOL A-L A2C: 60 ML (ref 22–52)
ECHO LA VOL A-L A4C: 105 ML (ref 22–52)
ECHO LA VOL MOD A2C: 56 ML (ref 22–52)
ECHO LA VOL MOD A4C: 100 ML (ref 22–52)
ECHO LA VOLUME AREA LENGTH: 84 ML
ECHO LA VOLUME INDEX A-L A2C: 34 ML/M2 (ref 16–34)
ECHO LA VOLUME INDEX A-L A4C: 59 ML/M2 (ref 16–34)
ECHO LA VOLUME INDEX AREA LENGTH: 47 ML/M2 (ref 16–34)
ECHO LA VOLUME INDEX MOD A2C: 31 ML/M2 (ref 16–34)
ECHO LA VOLUME INDEX MOD A4C: 56 ML/M2 (ref 16–34)
ECHO LV EF PHYSICIAN: 64 %
ECHO LV FRACTIONAL SHORTENING: 35 % (ref 28–44)
ECHO LV INTERNAL DIMENSION DIASTOLE INDEX: 2.08 CM/M2
ECHO LV INTERNAL DIMENSION DIASTOLIC: 3.7 CM (ref 3.9–5.3)
ECHO LV INTERNAL DIMENSION SYSTOLIC INDEX: 1.35 CM/M2
ECHO LV INTERNAL DIMENSION SYSTOLIC: 2.4 CM
ECHO LV ISOVOLUMETRIC RELAXATION TIME (IVRT): 65.7 MS
ECHO LV IVSD: 1.2 CM (ref 0.6–0.9)
ECHO LV IVSS: 1.9 CM
ECHO LV MASS 2D: 147.3 G (ref 67–162)
ECHO LV MASS INDEX 2D: 82.8 G/M2 (ref 43–95)
ECHO LV POSTERIOR WALL DIASTOLIC: 1.2 CM (ref 0.6–0.9)
ECHO LV POSTERIOR WALL SYSTOLIC: 1.5 CM
ECHO LV RELATIVE WALL THICKNESS RATIO: 0.65
ECHO LVOT AREA: 3.1 CM2
ECHO LVOT AV VTI INDEX: 0.78
ECHO LVOT DIAM: 2 CM
ECHO LVOT MEAN GRADIENT: 4 MMHG
ECHO LVOT PEAK GRADIENT: 8 MMHG
ECHO LVOT PEAK VELOCITY: 1.4 M/S
ECHO LVOT STROKE VOLUME INDEX: 61.7 ML/M2
ECHO LVOT SV: 109.9 ML
ECHO LVOT VTI: 35 CM
ECHO MV "A" WAVE DURATION: 173 MSEC
ECHO MV A VELOCITY: 1.19 M/S
ECHO MV AREA PHT: 2.4 CM2
ECHO MV AREA VTI: 2.2 CM2
ECHO MV E DECELERATION TIME (DT): 296.1 MS
ECHO MV E VELOCITY: 1.33 M/S
ECHO MV E/A RATIO: 1.12
ECHO MV LVOT VTI INDEX: 1.44
ECHO MV MAX VELOCITY: 1.6 M/S
ECHO MV MEAN GRADIENT: 4 MMHG
ECHO MV MEAN VELOCITY: 0.9 M/S
ECHO MV PEAK GRADIENT: 10 MMHG
ECHO MV PRESSURE HALF TIME (PHT): 89.8 MS
ECHO MV VTI: 50.3 CM
ECHO PV MAX VELOCITY: 1 M/S
ECHO PV MEAN GRADIENT: 2 MMHG
ECHO PV MEAN VELOCITY: 0.7 M/S
ECHO PV PEAK GRADIENT: 4 MMHG
ECHO PV VTI: 24.4 CM
ECHO PVEIN A DURATION: 110.7 MS
ECHO PVEIN A VELOCITY: 0.2 M/S
ECHO PVEIN PEAK D VELOCITY: 0.5 M/S
ECHO PVEIN PEAK S VELOCITY: 0.6 M/S
ECHO PVEIN S/D RATIO: 1.2
ECHO RIGHT VENTRICULAR SYSTOLIC PRESSURE (RVSP): 44 MMHG
ECHO RV INTERNAL DIMENSION: 2.6 CM
ECHO TV REGURGITANT MAX VELOCITY: 3.02 M/S
ECHO TV REGURGITANT PEAK GRADIENT: 37 MMHG

## 2025-06-06 PROCEDURE — 93306 TTE W/DOPPLER COMPLETE: CPT

## 2025-06-19 DIAGNOSIS — L53.9 ERYTHEMA: ICD-10-CM

## 2025-06-20 ENCOUNTER — HOSPITAL ENCOUNTER (OUTPATIENT)
Dept: RADIATION ONCOLOGY | Age: 70
Discharge: HOME | End: 2025-06-20

## 2025-06-20 ENCOUNTER — TELEPHONE (OUTPATIENT)
Dept: CASE MANAGEMENT | Age: 70
End: 2025-06-20

## 2025-06-20 VITALS
HEART RATE: 60 BPM | RESPIRATION RATE: 18 BRPM | SYSTOLIC BLOOD PRESSURE: 131 MMHG | WEIGHT: 170.6 LBS | OXYGEN SATURATION: 95 % | BODY MASS INDEX: 31.2 KG/M2 | DIASTOLIC BLOOD PRESSURE: 64 MMHG | TEMPERATURE: 98 F

## 2025-06-20 DIAGNOSIS — C54.9 MALIGNANT NEOPLASM OF CORPUS UTERI, EXCEPT ISTHMUS (HCC): Primary | ICD-10-CM

## 2025-06-20 DIAGNOSIS — C54.1 ENDOMETRIAL CANCER (HCC): Primary | ICD-10-CM

## 2025-06-20 RX ORDER — OXYCODONE HYDROCHLORIDE 10 MG/1
10 TABLET ORAL EVERY 4 HOURS PRN
COMMUNITY
Start: 2025-06-14 | End: 2025-07-14

## 2025-06-20 RX ORDER — FUROSEMIDE 20 MG/1
20 TABLET ORAL DAILY
COMMUNITY
Start: 2025-05-06

## 2025-06-20 ASSESSMENT — PAIN DESCRIPTION - LOCATION: LOCATION: BACK;VAGINA

## 2025-06-20 ASSESSMENT — PAIN SCALES - GENERAL: PAINLEVEL_OUTOF10: 2

## 2025-06-20 ASSESSMENT — PAIN DESCRIPTION - DESCRIPTORS: DESCRIPTORS: ACHING

## 2025-06-20 ASSESSMENT — PAIN DESCRIPTION - ORIENTATION: ORIENTATION: LOWER

## 2025-06-20 NOTE — PROGRESS NOTES
DEPARTMENT OF RADIATION ONCOLOGY   Follow up visit        2025    NAME:  Álvaro Cervantes    :  1955 69 y.o. 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  The brachytherapy boost that was done at [facility] by Dr. Perera.  The patient is here for routine follow-up, she has no vaginal bleeding no drainage, but she does complain of fecal as well as urinary incontinence since the time of the brachytherapy boost that is maybe doing a little bit better.  She is also complaining of pain, presumably as acute side effects from the treatment.  She takes oxycodone every 4 hours for that.  On physical examination at the wrist has moved area of thickness at the level of the rectovaginal septum.  The small volar nodules at the level of the vagina are not palpable anymore.  Weight is stable, appetite is good.    Past medical, surgical, social and family histories reviewed and updated as indicated.    ALLERGIES:  Gabapentin       MEDICATIONS:   Current Outpatient Medications:     furosemide (LASIX) 20 MG tablet, Take 1 tablet by mouth daily, Disp: , Rfl:     oxyCODONE HCl (OXY-IR) 10 MG immediate release tablet, Take 1 tablet by mouth every 4 hours as needed., Disp: , Rfl:     silver sulfADIAZINE (SILVADENE) 1 % cream, Apply topically 2 to 3 times daily.  Okay to mix with Aquaphor and Desitin, Disp: 50 g, Rfl: 2

## 2025-06-20 NOTE — TELEPHONE ENCOUNTER
Met with patient during her follow up appointment with Dr. Melgar today. Patient completed her active treatment on 3/25/2025 for her stage IIIB Uterine Cancer diagnosis. States that she is doing well since completing treatment. Reports that her appetite is good and she is sleeping well. Provided support and encouragement. Instructed patient in detail on her Cancer Treatment Summary and Survivorship Care Plan. Provided with a written copy. Aware that a copy will be sent to her PCP as well. Provided written copies of Patient Resource Booklet: Cancer Survivorship, Nutrition Following Cancer Treatment: Oncology Nutrition Guide, and Thriving and Surviving Post-Cancer Treatment: Nutritional and Emotional Support Services packet. Instructed patient to call with any questions or concerns. Verbally agreed. Patient appreciative of visit and information. Jacqueline ROSALESN, RN, Oncology Nurse Navigator

## 2025-06-20 NOTE — PROGRESS NOTES
Álvaro Cervantes  6/20/2025  8:19 AM      Vitals:    06/20/25 0806   BP: 131/64   Pulse: 60   Resp: 18   Temp: 98 °F (36.7 °C)   SpO2: 95%    :    Wt Readings from Last 3 Encounters:   06/20/25 77.4 kg (170 lb 9.6 oz)   06/06/25 77.1 kg (170 lb)   03/21/25 77.2 kg (170 lb 3.2 oz)                Current Outpatient Medications:     furosemide (LASIX) 20 MG tablet, Take 1 tablet by mouth daily, Disp: , Rfl:     oxyCODONE HCl (OXY-IR) 10 MG immediate release tablet, Take 1 tablet by mouth every 4 hours as needed., Disp: , Rfl:     silver sulfADIAZINE (SILVADENE) 1 % cream, Apply topically 2 to 3 times daily.  Okay to mix with Aquaphor and Desitin, Disp: 50 g, Rfl: 2    HYDROcodone-acetaminophen (NORCO) 7.5-325 MG per tablet, Take 1 tablet by mouth every 6 hours as needed for Pain. Max Daily Amount: 4 tablets, Disp: , Rfl:     sennosides-docusate sodium (SENOKOT-S) 8.6-50 MG tablet, Take 1 tablet by mouth daily, Disp: , Rfl:     levalbuterol (XOPENEX HFA) 45 MCG/ACT inhaler, Inhale 1 puff into the lungs every 4 hours as needed for Wheezing, Disp: 15 g, Rfl: 3    GLOBAL EASE INJECT PEN NEEDLES 31G X 5 MM MISC, , Disp: , Rfl:     lidocaine-prilocaine (EMLA) 2.5-2.5 % cream, , Disp: , Rfl:     tiZANidine (ZANAFLEX) 2 MG tablet, , Disp: , Rfl:     losartan (COZAAR) 50 MG tablet, Not taking for now, Disp: , Rfl:     omeprazole (PRILOSEC) 40 MG delayed release capsule, , Disp: , Rfl:     Saline (SHARON NASAL MIST ALLERGY/SINUS NA), by Nasal route, Disp: , Rfl:     Semaglutide,0.25 or 0.5MG/DOS, (OZEMPIC, 0.25 OR 0.5 MG/DOSE,) 2 MG/1.5ML SOPN, Inject into the skin once a week States stopped taking this since last month, Disp: , Rfl:     metoprolol succinate (TOPROL XL) 50 MG extended release tablet, Take 1 tablet by mouth in the morning and at bedtime (Patient taking differently: Take 1 tablet by mouth in the morning and at bedtime Not taking), Disp: 60 tablet, Rfl: 3    traZODone (DESYREL) 150 MG tablet, , Disp: , Rfl:

## 2025-07-02 RX ORDER — SILVER SULFADIAZINE 10 MG/G
CREAM TOPICAL
Qty: 50 G | Refills: 2 | Status: SHIPPED | OUTPATIENT
Start: 2025-07-02

## 2025-07-28 RX ORDER — SILVER SULFADIAZINE 10 MG/G
CREAM TOPICAL
Qty: 50 G | Refills: 2 | OUTPATIENT
Start: 2025-07-28

## 2025-08-11 DIAGNOSIS — C54.1 ENDOMETRIAL CANCER (HCC): Primary | ICD-10-CM

## 2025-08-31 ENCOUNTER — HOSPITAL ENCOUNTER (OUTPATIENT)
Dept: MRI IMAGING | Age: 70
Discharge: HOME | End: 2025-09-02
Attending: RADIOLOGY
Payer: MEDICARE

## 2025-08-31 DIAGNOSIS — C54.1 ENDOMETRIAL CANCER (HCC): ICD-10-CM

## 2025-08-31 PROCEDURE — A9577 INJ MULTIHANCE: HCPCS | Performed by: RADIOLOGY

## 2025-08-31 PROCEDURE — 72197 MRI PELVIS W/O & W/DYE: CPT

## 2025-08-31 PROCEDURE — 6360000004 HC RX CONTRAST MEDICATION: Performed by: RADIOLOGY

## 2025-08-31 RX ADMIN — GADOBENATE DIMEGLUMINE 15 ML: 529 INJECTION, SOLUTION INTRAVENOUS at 09:23

## (undated) DEVICE — TRAY,VAG PREP,2PR VNYL GLV,4 C: Brand: MEDLINE INDUSTRIES, INC.

## (undated) DEVICE — GOWN,SIRUS,NONRNF,SETINSLV,XL,20/CS: Brand: MEDLINE

## (undated) DEVICE — DOUBLE BASIN SET: Brand: MEDLINE INDUSTRIES, INC.

## (undated) DEVICE — Z INACTIVE USE 2660664 SOLUTION IRRIG 3000ML 0.9% SOD CHL USP UROMATIC PLAS CONT

## (undated) DEVICE — PAD MATERNITY CURITY ADH STRIP DISP

## (undated) DEVICE — ELECTRODE MPLR DIA24FR 0.015IN WIRE YEL STR LOOP UROLOGY

## (undated) DEVICE — PACK PROC 3IN1 W/ L12FT DIA0.25IN REINF SUCT TBNG W50XL901IN

## (undated) DEVICE — TRAY PROCED DILATATION CURETTAGE

## (undated) DEVICE — TOWEL,OR,DSP,ST,BLUE,STD,6/PK,12PK/CS: Brand: MEDLINE

## (undated) DEVICE — LENS 30 DEG CIRCON

## (undated) DEVICE — LENS CORD GYN 0-DEG 5 MM CIRCON

## (undated) DEVICE — MARKER,SKIN,WI/RULER AND LABELS: Brand: MEDLINE

## (undated) DEVICE — COVER,LIGHT HANDLE,FLX,1/PK: Brand: MEDLINE INDUSTRIES, INC.

## (undated) DEVICE — GAUZE,SPONGE,4"X4",16PLY,XRAY,STRL,LF: Brand: MEDLINE

## (undated) DEVICE — GLOVE ORANGE PI 7 1/2   MSG9075

## (undated) DEVICE — LENS 12 DEG CIRCON

## (undated) DEVICE — TRAY PROCED HYSTEROSCOPY CIRCON 1

## (undated) DEVICE — CAMERA STRYKER 1488 HD GEN